# Patient Record
Sex: MALE | Race: WHITE | NOT HISPANIC OR LATINO | URBAN - METROPOLITAN AREA
[De-identification: names, ages, dates, MRNs, and addresses within clinical notes are randomized per-mention and may not be internally consistent; named-entity substitution may affect disease eponyms.]

---

## 2017-01-02 ENCOUNTER — OFFICE VISIT - HEALTHEAST (OUTPATIENT)
Dept: FAMILY MEDICINE | Facility: CLINIC | Age: 52
End: 2017-01-02

## 2017-01-02 ENCOUNTER — HOSPITAL ENCOUNTER (OUTPATIENT)
Dept: CT IMAGING | Facility: CLINIC | Age: 52
Discharge: HOME | End: 2017-01-02

## 2017-01-02 DIAGNOSIS — R10.9 ABDOMINAL PAIN: ICD-10-CM

## 2017-01-02 DIAGNOSIS — K57.20 DIVERTICULITIS OF LARGE INTESTINE WITH ABSCESS WITHOUT BLEEDING: ICD-10-CM

## 2017-01-11 ENCOUNTER — COMMUNICATION - HEALTHEAST (OUTPATIENT)
Dept: FAMILY MEDICINE | Facility: CLINIC | Age: 52
End: 2017-01-11

## 2017-01-12 ENCOUNTER — OFFICE VISIT - HEALTHEAST (OUTPATIENT)
Dept: FAMILY MEDICINE | Facility: CLINIC | Age: 52
End: 2017-01-12

## 2017-01-12 ENCOUNTER — COMMUNICATION - HEALTHEAST (OUTPATIENT)
Dept: FAMILY MEDICINE | Facility: CLINIC | Age: 52
End: 2017-01-12

## 2017-01-12 ENCOUNTER — COMMUNICATION - HEALTHEAST (OUTPATIENT)
Dept: ONCOLOGY | Facility: CLINIC | Age: 52
End: 2017-01-12

## 2017-01-12 DIAGNOSIS — K57.32 DIVERTICULITIS OF LARGE INTESTINE WITHOUT PERFORATION OR ABSCESS WITHOUT BLEEDING: ICD-10-CM

## 2017-01-12 ASSESSMENT — MIFFLIN-ST. JEOR: SCORE: 1699.01

## 2017-01-23 ENCOUNTER — HOSPITAL ENCOUNTER (OUTPATIENT)
Dept: RADIOLOGY | Facility: CLINIC | Age: 52
Discharge: HOME | End: 2017-01-23
Attending: INTERNAL MEDICINE

## 2017-01-23 ENCOUNTER — OFFICE VISIT - HEALTHEAST (OUTPATIENT)
Dept: ONCOLOGY | Facility: CLINIC | Age: 52
End: 2017-01-23

## 2017-01-23 ENCOUNTER — AMBULATORY - HEALTHEAST (OUTPATIENT)
Dept: INFUSION THERAPY | Facility: CLINIC | Age: 52
End: 2017-01-23

## 2017-01-23 DIAGNOSIS — C43.9 MELANOMA (H): ICD-10-CM

## 2017-01-23 DIAGNOSIS — C43.59 MALIGNANT MELANOMA OF SKIN OF TRUNK, EXCEPT SCROTUM (H): ICD-10-CM

## 2017-01-24 ENCOUNTER — COMMUNICATION - HEALTHEAST (OUTPATIENT)
Dept: ONCOLOGY | Facility: CLINIC | Age: 52
End: 2017-01-24

## 2017-04-19 ENCOUNTER — COMMUNICATION - HEALTHEAST (OUTPATIENT)
Dept: SCHEDULING | Facility: CLINIC | Age: 52
End: 2017-04-19

## 2017-04-19 DIAGNOSIS — E03.9 HYPOTHYROIDISM: ICD-10-CM

## 2017-07-17 ENCOUNTER — HOSPITAL ENCOUNTER (OUTPATIENT)
Dept: CT IMAGING | Facility: CLINIC | Age: 52
Setting detail: RADIATION/ONCOLOGY SERIES
Discharge: STILL ADMITTED OR ADMINISTRATIVE | End: 2017-07-17
Attending: INTERNAL MEDICINE

## 2017-07-17 DIAGNOSIS — C43.9 MELANOMA (H): ICD-10-CM

## 2017-07-31 ENCOUNTER — AMBULATORY - HEALTHEAST (OUTPATIENT)
Dept: INFUSION THERAPY | Facility: CLINIC | Age: 52
End: 2017-07-31

## 2017-07-31 ENCOUNTER — OFFICE VISIT - HEALTHEAST (OUTPATIENT)
Dept: FAMILY MEDICINE | Facility: CLINIC | Age: 52
End: 2017-07-31

## 2017-07-31 ENCOUNTER — OFFICE VISIT - HEALTHEAST (OUTPATIENT)
Dept: ONCOLOGY | Facility: CLINIC | Age: 52
End: 2017-07-31

## 2017-07-31 DIAGNOSIS — Z13.220 SCREENING, LIPID: ICD-10-CM

## 2017-07-31 DIAGNOSIS — C43.59 MALIGNANT MELANOMA OF SKIN OF TRUNK, EXCEPT SCROTUM (H): ICD-10-CM

## 2017-07-31 DIAGNOSIS — E03.9 HYPOTHYROIDISM: ICD-10-CM

## 2017-07-31 DIAGNOSIS — E83.52 HYPERCALCEMIA: ICD-10-CM

## 2017-07-31 DIAGNOSIS — J45.20 MILD INTERMITTENT ASTHMA WITHOUT COMPLICATION: ICD-10-CM

## 2017-07-31 DIAGNOSIS — K80.20 ASYMPTOMATIC GALLSTONES: ICD-10-CM

## 2017-07-31 DIAGNOSIS — C43.9 MELANOMA (H): ICD-10-CM

## 2017-07-31 DIAGNOSIS — N20.0 KIDNEY STONE ON LEFT SIDE: ICD-10-CM

## 2017-07-31 LAB
CHOLEST SERPL-MCNC: 180 MG/DL
FASTING STATUS PATIENT QL REPORTED: ABNORMAL
HDLC SERPL-MCNC: 33 MG/DL
LDLC SERPL CALC-MCNC: 83 MG/DL
TRIGL SERPL-MCNC: 319 MG/DL

## 2017-08-23 ENCOUNTER — COMMUNICATION - HEALTHEAST (OUTPATIENT)
Dept: FAMILY MEDICINE | Facility: CLINIC | Age: 52
End: 2017-08-23

## 2017-08-23 DIAGNOSIS — J45.20 MILD INTERMITTENT ASTHMA WITHOUT COMPLICATION: ICD-10-CM

## 2017-10-06 ENCOUNTER — OFFICE VISIT - HEALTHEAST (OUTPATIENT)
Dept: FAMILY MEDICINE | Facility: CLINIC | Age: 52
End: 2017-10-06

## 2017-10-06 ENCOUNTER — COMMUNICATION - HEALTHEAST (OUTPATIENT)
Dept: FAMILY MEDICINE | Facility: CLINIC | Age: 52
End: 2017-10-06

## 2017-10-06 DIAGNOSIS — Z23 NEED FOR INFLUENZA VACCINATION: ICD-10-CM

## 2017-10-06 DIAGNOSIS — R10.9 FLANK PAIN: ICD-10-CM

## 2017-10-06 ASSESSMENT — MIFFLIN-ST. JEOR: SCORE: 1713.53

## 2017-10-09 ENCOUNTER — COMMUNICATION - HEALTHEAST (OUTPATIENT)
Dept: FAMILY MEDICINE | Facility: CLINIC | Age: 52
End: 2017-10-09

## 2017-10-09 DIAGNOSIS — E03.9 HYPOTHYROIDISM: ICD-10-CM

## 2017-11-07 ENCOUNTER — OFFICE VISIT - HEALTHEAST (OUTPATIENT)
Dept: FAMILY MEDICINE | Facility: CLINIC | Age: 52
End: 2017-11-07

## 2017-11-07 ENCOUNTER — OFFICE VISIT - HEALTHEAST (OUTPATIENT)
Dept: UROLOGY | Facility: CLINIC | Age: 52
End: 2017-11-07

## 2017-11-07 ENCOUNTER — HOSPITAL ENCOUNTER (OUTPATIENT)
Dept: CT IMAGING | Facility: CLINIC | Age: 52
Discharge: HOME | End: 2017-11-07
Attending: FAMILY MEDICINE

## 2017-11-07 ENCOUNTER — COMMUNICATION - HEALTHEAST (OUTPATIENT)
Dept: FAMILY MEDICINE | Facility: CLINIC | Age: 52
End: 2017-11-07

## 2017-11-07 DIAGNOSIS — R10.9 LEFT FLANK PAIN: ICD-10-CM

## 2017-11-07 DIAGNOSIS — N20.9 CALCULUS OF URINARY TRACT: ICD-10-CM

## 2017-11-07 DIAGNOSIS — N20.1 CALCULUS OF URETER: ICD-10-CM

## 2017-11-13 ENCOUNTER — COMMUNICATION - HEALTHEAST (OUTPATIENT)
Dept: UROLOGY | Facility: CLINIC | Age: 52
End: 2017-11-13

## 2017-11-14 ENCOUNTER — ANESTHESIA - HEALTHEAST (OUTPATIENT)
Dept: SURGERY | Facility: CLINIC | Age: 52
End: 2017-11-14

## 2017-11-14 ENCOUNTER — SURGERY - HEALTHEAST (OUTPATIENT)
Dept: SURGERY | Facility: CLINIC | Age: 52
End: 2017-11-14

## 2017-11-14 ENCOUNTER — OFFICE VISIT - HEALTHEAST (OUTPATIENT)
Dept: UROLOGY | Facility: CLINIC | Age: 52
End: 2017-11-14

## 2017-11-14 DIAGNOSIS — N20.0 CALCULUS OF KIDNEY: ICD-10-CM

## 2017-11-14 DIAGNOSIS — N20.1 CALCULUS OF URETER: ICD-10-CM

## 2017-11-14 DIAGNOSIS — N20.9 URINARY TRACT STONES: ICD-10-CM

## 2017-11-14 ASSESSMENT — MIFFLIN-ST. JEOR: SCORE: 1685.4

## 2017-11-21 ENCOUNTER — AMBULATORY - HEALTHEAST (OUTPATIENT)
Dept: UROLOGY | Facility: CLINIC | Age: 52
End: 2017-11-21

## 2017-11-21 DIAGNOSIS — N20.9 URINARY TRACT STONES: ICD-10-CM

## 2017-11-21 DIAGNOSIS — N20.1 CALCULUS OF URETER: ICD-10-CM

## 2017-11-27 ENCOUNTER — COMMUNICATION - HEALTHEAST (OUTPATIENT)
Dept: FAMILY MEDICINE | Facility: CLINIC | Age: 52
End: 2017-11-27

## 2017-11-27 ENCOUNTER — OFFICE VISIT - HEALTHEAST (OUTPATIENT)
Dept: FAMILY MEDICINE | Facility: CLINIC | Age: 52
End: 2017-11-27

## 2017-11-27 DIAGNOSIS — R10.84 DIFFUSE ABDOMINAL PAIN: ICD-10-CM

## 2017-11-27 DIAGNOSIS — E03.9 HYPOTHYROIDISM: ICD-10-CM

## 2017-12-06 ENCOUNTER — COMMUNICATION - HEALTHEAST (OUTPATIENT)
Dept: FAMILY MEDICINE | Facility: CLINIC | Age: 52
End: 2017-12-06

## 2017-12-08 ENCOUNTER — COMMUNICATION - HEALTHEAST (OUTPATIENT)
Dept: FAMILY MEDICINE | Facility: CLINIC | Age: 52
End: 2017-12-08

## 2017-12-11 ENCOUNTER — COMMUNICATION - HEALTHEAST (OUTPATIENT)
Dept: FAMILY MEDICINE | Facility: CLINIC | Age: 52
End: 2017-12-11

## 2017-12-16 ENCOUNTER — COMMUNICATION - HEALTHEAST (OUTPATIENT)
Dept: SCHEDULING | Facility: CLINIC | Age: 52
End: 2017-12-16

## 2017-12-16 ENCOUNTER — COMMUNICATION - HEALTHEAST (OUTPATIENT)
Dept: FAMILY MEDICINE | Facility: CLINIC | Age: 52
End: 2017-12-16

## 2017-12-16 DIAGNOSIS — R10.84 DIFFUSE ABDOMINAL PAIN: ICD-10-CM

## 2017-12-18 ENCOUNTER — OFFICE VISIT - HEALTHEAST (OUTPATIENT)
Dept: FAMILY MEDICINE | Facility: CLINIC | Age: 52
End: 2017-12-18

## 2017-12-18 DIAGNOSIS — R10.32 LLQ PAIN: ICD-10-CM

## 2017-12-19 ENCOUNTER — COMMUNICATION - HEALTHEAST (OUTPATIENT)
Dept: FAMILY MEDICINE | Facility: CLINIC | Age: 52
End: 2017-12-19

## 2018-01-02 ENCOUNTER — COMMUNICATION - HEALTHEAST (OUTPATIENT)
Dept: FAMILY MEDICINE | Facility: CLINIC | Age: 53
End: 2018-01-02

## 2018-01-08 ENCOUNTER — COMMUNICATION - HEALTHEAST (OUTPATIENT)
Dept: UROLOGY | Facility: CLINIC | Age: 53
End: 2018-01-08

## 2018-01-15 ENCOUNTER — OFFICE VISIT - HEALTHEAST (OUTPATIENT)
Dept: UROLOGY | Facility: CLINIC | Age: 53
End: 2018-01-15

## 2018-01-15 ENCOUNTER — AMBULATORY - HEALTHEAST (OUTPATIENT)
Dept: LAB | Facility: CLINIC | Age: 53
End: 2018-01-15

## 2018-01-15 DIAGNOSIS — N20.0 CALCULUS OF KIDNEY: ICD-10-CM

## 2018-01-15 DIAGNOSIS — N20.9 URINARY TRACT STONES: ICD-10-CM

## 2018-01-15 DIAGNOSIS — N20.1 CALCULUS OF URETER: ICD-10-CM

## 2018-01-15 LAB
ALBUMIN SERPL-MCNC: 3.5 G/DL (ref 3.5–5)
ALBUMIN UR-MCNC: NEGATIVE MG/DL
ANION GAP SERPL CALCULATED.3IONS-SCNC: 7 MMOL/L (ref 5–18)
APPEARANCE UR: CLEAR
BILIRUB UR QL STRIP: NEGATIVE
BUN SERPL-MCNC: 13 MG/DL (ref 8–22)
CALCIUM SERPL-MCNC: 10.7 MG/DL (ref 8.5–10.5)
CALCIUM SERPL-MCNC: 10.8 MG/DL (ref 8.5–10.5)
CALCIUM, IONIZED MEASURED: 1.45 MMOL/L (ref 1.11–1.3)
CHLORIDE BLD-SCNC: 104 MMOL/L (ref 98–107)
CO2 SERPL-SCNC: 27 MMOL/L (ref 22–31)
COLOR UR AUTO: YELLOW
CREAT SERPL-MCNC: 0.9 MG/DL (ref 0.7–1.3)
CREAT SERPL-MCNC: 0.93 MG/DL (ref 0.7–1.3)
GFR SERPL CREATININE-BSD FRML MDRD: >60 ML/MIN/1.73M2
GFR SERPL CREATININE-BSD FRML MDRD: >60 ML/MIN/1.73M2
GLUCOSE BLD-MCNC: 96 MG/DL (ref 70–125)
GLUCOSE UR STRIP-MCNC: NEGATIVE MG/DL
HGB UR QL STRIP: NEGATIVE
ION CA PH 7.4: 1.43 MMOL/L (ref 1.11–1.3)
KETONES UR STRIP-MCNC: NEGATIVE MG/DL
LEUKOCYTE ESTERASE UR QL STRIP: NEGATIVE
NITRATE UR QL: NEGATIVE
PH UR STRIP: 5.5 [PH] (ref 5–8)
PH: 7.36 (ref 7.35–7.45)
PHOSPHATE SERPL-MCNC: 1.9 MG/DL (ref 2.5–4.5)
PHOSPHATE SERPL-MCNC: 2 MG/DL (ref 2.5–4.5)
POTASSIUM BLD-SCNC: 4.2 MMOL/L (ref 3.5–5)
PTH-INTACT SERPL-MCNC: 133 PG/ML (ref 10–86)
SODIUM SERPL-SCNC: 138 MMOL/L (ref 136–145)
SP GR UR STRIP: >=1.03 (ref 1–1.03)
URATE SERPL-MCNC: 4.5 MG/DL (ref 3–8)
UROBILINOGEN UR STRIP-ACNC: NORMAL

## 2018-01-22 ENCOUNTER — RECORDS - HEALTHEAST (OUTPATIENT)
Dept: ADMINISTRATIVE | Facility: OTHER | Age: 53
End: 2018-01-22

## 2018-01-22 ENCOUNTER — HOSPITAL ENCOUNTER (OUTPATIENT)
Dept: CT IMAGING | Facility: CLINIC | Age: 53
Setting detail: RADIATION/ONCOLOGY SERIES
Discharge: STILL ADMITTED OR ADMINISTRATIVE | End: 2018-01-22
Attending: INTERNAL MEDICINE

## 2018-01-22 DIAGNOSIS — C43.59 MALIGNANT MELANOMA OF SKIN OF TRUNK, EXCEPT SCROTUM (H): ICD-10-CM

## 2018-01-29 ENCOUNTER — AMBULATORY - HEALTHEAST (OUTPATIENT)
Dept: INFUSION THERAPY | Facility: CLINIC | Age: 53
End: 2018-01-29

## 2018-01-29 ENCOUNTER — OFFICE VISIT - HEALTHEAST (OUTPATIENT)
Dept: ONCOLOGY | Facility: CLINIC | Age: 53
End: 2018-01-29

## 2018-01-29 DIAGNOSIS — C43.59 MALIGNANT MELANOMA OF SKIN OF TRUNK, EXCEPT SCROTUM (H): ICD-10-CM

## 2018-01-29 DIAGNOSIS — E21.3 HYPERPARATHYROIDISM (H): ICD-10-CM

## 2018-01-29 DIAGNOSIS — E83.52 HYPERCALCEMIA: ICD-10-CM

## 2018-01-29 LAB
ALBUMIN SERPL-MCNC: 3.8 G/DL (ref 3.5–5)
ALP SERPL-CCNC: 76 U/L (ref 45–120)
ALT SERPL W P-5'-P-CCNC: 21 U/L (ref 0–45)
ANION GAP SERPL CALCULATED.3IONS-SCNC: 8 MMOL/L (ref 5–18)
AST SERPL W P-5'-P-CCNC: 19 U/L (ref 0–40)
BASOPHILS # BLD AUTO: 0 THOU/UL (ref 0–0.2)
BASOPHILS NFR BLD AUTO: 1 % (ref 0–2)
BILIRUB SERPL-MCNC: 0.5 MG/DL (ref 0–1)
BUN SERPL-MCNC: 16 MG/DL (ref 8–22)
CALCIUM SERPL-MCNC: 11.5 MG/DL (ref 8.5–10.5)
CHLORIDE BLD-SCNC: 104 MMOL/L (ref 98–107)
CO2 SERPL-SCNC: 28 MMOL/L (ref 22–31)
CREAT SERPL-MCNC: 1.07 MG/DL (ref 0.7–1.3)
EOSINOPHIL # BLD AUTO: 0.2 THOU/UL (ref 0–0.4)
EOSINOPHIL NFR BLD AUTO: 5 % (ref 0–6)
ERYTHROCYTE [DISTWIDTH] IN BLOOD BY AUTOMATED COUNT: 13.2 % (ref 11–14.5)
GFR SERPL CREATININE-BSD FRML MDRD: >60 ML/MIN/1.73M2
GLUCOSE BLD-MCNC: 98 MG/DL (ref 70–125)
HCT VFR BLD AUTO: 42.9 % (ref 40–54)
HGB BLD-MCNC: 14.4 G/DL (ref 14–18)
LYMPHOCYTES # BLD AUTO: 1.5 THOU/UL (ref 0.8–4.4)
LYMPHOCYTES NFR BLD AUTO: 34 % (ref 20–40)
MCH RBC QN AUTO: 28.5 PG (ref 27–34)
MCHC RBC AUTO-ENTMCNC: 33.6 G/DL (ref 32–36)
MCV RBC AUTO: 85 FL (ref 80–100)
MONOCYTES # BLD AUTO: 0.3 THOU/UL (ref 0–0.9)
MONOCYTES NFR BLD AUTO: 7 % (ref 2–10)
NEUTROPHILS # BLD AUTO: 2.4 THOU/UL (ref 2–7.7)
NEUTROPHILS NFR BLD AUTO: 54 % (ref 50–70)
PLATELET # BLD AUTO: 225 THOU/UL (ref 140–440)
PMV BLD AUTO: 8.8 FL (ref 8.5–12.5)
POTASSIUM BLD-SCNC: 4.2 MMOL/L (ref 3.5–5)
PROT SERPL-MCNC: 7.6 G/DL (ref 6–8)
RBC # BLD AUTO: 5.05 MILL/UL (ref 4.4–6.2)
SODIUM SERPL-SCNC: 140 MMOL/L (ref 136–145)
WBC: 4.5 THOU/UL (ref 4–11)

## 2018-01-29 ASSESSMENT — MIFFLIN-ST. JEOR: SCORE: 1668.62

## 2018-01-30 LAB — 25(OH)D3 SERPL-MCNC: 30.8 NG/ML (ref 30–80)

## 2018-02-02 ENCOUNTER — COMMUNICATION - HEALTHEAST (OUTPATIENT)
Dept: ONCOLOGY | Facility: HOSPITAL | Age: 53
End: 2018-02-02

## 2018-07-27 ENCOUNTER — COMMUNICATION - HEALTHEAST (OUTPATIENT)
Dept: ONCOLOGY | Facility: CLINIC | Age: 53
End: 2018-07-27

## 2018-08-02 ENCOUNTER — HOSPITAL ENCOUNTER (OUTPATIENT)
Dept: CT IMAGING | Facility: CLINIC | Age: 53
Setting detail: RADIATION/ONCOLOGY SERIES
Discharge: STILL ADMITTED OR ADMINISTRATIVE | End: 2018-08-02
Attending: INTERNAL MEDICINE

## 2018-08-02 DIAGNOSIS — E83.52 HYPERCALCEMIA: ICD-10-CM

## 2018-08-06 ENCOUNTER — OFFICE VISIT - HEALTHEAST (OUTPATIENT)
Dept: ONCOLOGY | Facility: CLINIC | Age: 53
End: 2018-08-06

## 2018-08-06 ENCOUNTER — AMBULATORY - HEALTHEAST (OUTPATIENT)
Dept: INFUSION THERAPY | Facility: CLINIC | Age: 53
End: 2018-08-06

## 2018-08-06 DIAGNOSIS — C43.59 MALIGNANT MELANOMA OF SKIN OF TRUNK, EXCEPT SCROTUM (H): ICD-10-CM

## 2018-08-06 DIAGNOSIS — E21.0 HYPERPARATHYROIDISM, PRIMARY (H): ICD-10-CM

## 2018-08-06 DIAGNOSIS — E21.3 HYPERPARATHYROIDISM (H): ICD-10-CM

## 2018-08-06 LAB
ALBUMIN SERPL-MCNC: 4 G/DL (ref 3.5–5)
ALP SERPL-CCNC: 84 U/L (ref 45–120)
ALT SERPL W P-5'-P-CCNC: 19 U/L (ref 0–45)
ANION GAP SERPL CALCULATED.3IONS-SCNC: 8 MMOL/L (ref 5–18)
AST SERPL W P-5'-P-CCNC: 18 U/L (ref 0–40)
BASOPHILS # BLD AUTO: 0 THOU/UL (ref 0–0.2)
BASOPHILS NFR BLD AUTO: 1 % (ref 0–2)
BILIRUB SERPL-MCNC: 0.5 MG/DL (ref 0–1)
BUN SERPL-MCNC: 18 MG/DL (ref 8–22)
CALCIUM SERPL-MCNC: 11.5 MG/DL (ref 8.5–10.5)
CHLORIDE BLD-SCNC: 103 MMOL/L (ref 98–107)
CO2 SERPL-SCNC: 26 MMOL/L (ref 22–31)
CREAT SERPL-MCNC: 1.1 MG/DL (ref 0.7–1.3)
EOSINOPHIL # BLD AUTO: 0.2 THOU/UL (ref 0–0.4)
EOSINOPHIL NFR BLD AUTO: 3 % (ref 0–6)
ERYTHROCYTE [DISTWIDTH] IN BLOOD BY AUTOMATED COUNT: 12.7 % (ref 11–14.5)
GFR SERPL CREATININE-BSD FRML MDRD: >60 ML/MIN/1.73M2
GLUCOSE BLD-MCNC: 107 MG/DL (ref 70–125)
HCT VFR BLD AUTO: 45.5 % (ref 40–54)
HGB BLD-MCNC: 15.3 G/DL (ref 14–18)
LYMPHOCYTES # BLD AUTO: 2 THOU/UL (ref 0.8–4.4)
LYMPHOCYTES NFR BLD AUTO: 32 % (ref 20–40)
MCH RBC QN AUTO: 28.9 PG (ref 27–34)
MCHC RBC AUTO-ENTMCNC: 33.6 G/DL (ref 32–36)
MCV RBC AUTO: 86 FL (ref 80–100)
MONOCYTES # BLD AUTO: 0.4 THOU/UL (ref 0–0.9)
MONOCYTES NFR BLD AUTO: 6 % (ref 2–10)
NEUTROPHILS # BLD AUTO: 3.6 THOU/UL (ref 2–7.7)
NEUTROPHILS NFR BLD AUTO: 59 % (ref 50–70)
PLATELET # BLD AUTO: 226 THOU/UL (ref 140–440)
PMV BLD AUTO: 8.8 FL (ref 8.5–12.5)
POTASSIUM BLD-SCNC: 4.2 MMOL/L (ref 3.5–5)
PROT SERPL-MCNC: 8 G/DL (ref 6–8)
PTH-INTACT SERPL-MCNC: 110 PG/ML (ref 10–86)
RBC # BLD AUTO: 5.29 MILL/UL (ref 4.4–6.2)
SODIUM SERPL-SCNC: 137 MMOL/L (ref 136–145)
WBC: 6.2 THOU/UL (ref 4–11)

## 2018-08-06 ASSESSMENT — MIFFLIN-ST. JEOR: SCORE: 1687.22

## 2018-08-08 ENCOUNTER — COMMUNICATION - HEALTHEAST (OUTPATIENT)
Dept: ADMINISTRATIVE | Facility: CLINIC | Age: 53
End: 2018-08-08

## 2018-08-08 DIAGNOSIS — E21.3 HYPERPARATHYROIDISM (H): ICD-10-CM

## 2018-08-13 ENCOUNTER — AMBULATORY - HEALTHEAST (OUTPATIENT)
Dept: LAB | Facility: CLINIC | Age: 53
End: 2018-08-13

## 2018-08-13 DIAGNOSIS — E21.3 HYPERPARATHYROIDISM (H): ICD-10-CM

## 2018-08-13 LAB
CALCIUM SERPL-MCNC: 11.8 MG/DL (ref 8.5–10.5)
CALCIUM SERPL-MCNC: 11.9 MG/DL (ref 8.5–10.5)
CREAT SERPL-MCNC: 1.09 MG/DL (ref 0.7–1.3)
CREAT SERPL-MCNC: 1.09 MG/DL (ref 0.7–1.3)
GFR SERPL CREATININE-BSD FRML MDRD: >60 ML/MIN/1.73M2
GFR SERPL CREATININE-BSD FRML MDRD: >60 ML/MIN/1.73M2
PHOSPHATE SERPL-MCNC: 2.3 MG/DL (ref 2.5–4.5)
PHOSPHATE SERPL-MCNC: 2.3 MG/DL (ref 2.5–4.5)
POTASSIUM BLD-SCNC: 4.4 MMOL/L (ref 3.5–5)
PTH-INTACT SERPL-MCNC: 100 PG/ML (ref 10–86)

## 2018-08-14 LAB
25(OH)D3 SERPL-MCNC: 30.3 NG/ML (ref 30–80)
25(OH)D3 SERPL-MCNC: 30.3 NG/ML (ref 30–80)

## 2018-08-16 LAB — 1,25(OH)2D SERPL-MCNC: 67 PG/ML (ref 19.9–79.3)

## 2018-08-20 ENCOUNTER — AMBULATORY - HEALTHEAST (OUTPATIENT)
Dept: ENDOCRINOLOGY | Facility: CLINIC | Age: 53
End: 2018-08-20

## 2018-08-20 DIAGNOSIS — E21.3 HYPERPARATHYROIDISM (H): ICD-10-CM

## 2018-08-23 ENCOUNTER — HOSPITAL ENCOUNTER (OUTPATIENT)
Dept: NUCLEAR MEDICINE | Facility: CLINIC | Age: 53
Discharge: HOME | End: 2018-08-23
Attending: INTERNAL MEDICINE

## 2018-08-23 ENCOUNTER — HOSPITAL ENCOUNTER (OUTPATIENT)
Dept: ULTRASOUND IMAGING | Facility: CLINIC | Age: 53
Discharge: HOME | End: 2018-08-23
Attending: INTERNAL MEDICINE

## 2018-08-23 DIAGNOSIS — E21.3 HYPERPARATHYROIDISM (H): ICD-10-CM

## 2018-09-10 ENCOUNTER — OFFICE VISIT - HEALTHEAST (OUTPATIENT)
Dept: ENDOCRINOLOGY | Facility: CLINIC | Age: 53
End: 2018-09-10

## 2018-09-10 DIAGNOSIS — E21.3 HYPERPARATHYROIDISM (H): ICD-10-CM

## 2018-09-10 ASSESSMENT — MIFFLIN-ST. JEOR: SCORE: 1693.57

## 2018-09-18 ENCOUNTER — AMBULATORY - HEALTHEAST (OUTPATIENT)
Dept: LAB | Facility: CLINIC | Age: 53
End: 2018-09-18

## 2018-09-18 ENCOUNTER — OFFICE VISIT - HEALTHEAST (OUTPATIENT)
Dept: SURGERY | Facility: CLINIC | Age: 53
End: 2018-09-18

## 2018-09-18 DIAGNOSIS — E21.3 HYPERPARATHYROIDISM (H): ICD-10-CM

## 2018-09-18 LAB
CALCIUM 24H UR-MRATE: 538 MG/24HR (ref 25–300)
CALCIUM UR-MCNC: 53.8 MG/DL
SPECIMEN VOL UR: 1000 ML

## 2018-09-18 ASSESSMENT — MIFFLIN-ST. JEOR: SCORE: 1686.31

## 2018-10-02 ASSESSMENT — MIFFLIN-ST. JEOR: SCORE: 1671.79

## 2018-10-05 ENCOUNTER — OFFICE VISIT - HEALTHEAST (OUTPATIENT)
Dept: FAMILY MEDICINE | Facility: CLINIC | Age: 53
End: 2018-10-05

## 2018-10-05 DIAGNOSIS — J45.20 MILD INTERMITTENT ASTHMA WITHOUT COMPLICATION: ICD-10-CM

## 2018-10-05 DIAGNOSIS — R12 HEARTBURN: ICD-10-CM

## 2018-10-05 DIAGNOSIS — Z01.818 ENCOUNTER FOR PREOPERATIVE EXAMINATION FOR GENERAL SURGICAL PROCEDURE: ICD-10-CM

## 2018-10-05 DIAGNOSIS — E03.9 HYPOTHYROIDISM: ICD-10-CM

## 2018-10-05 DIAGNOSIS — E21.3 HYPERPARATHYROIDISM (H): ICD-10-CM

## 2018-10-05 DIAGNOSIS — Z23 NEED FOR VACCINATION: ICD-10-CM

## 2018-10-05 LAB
ANION GAP SERPL CALCULATED.3IONS-SCNC: 11 MMOL/L (ref 5–18)
BUN SERPL-MCNC: 19 MG/DL (ref 8–22)
CALCIUM SERPL-MCNC: 12 MG/DL (ref 8.5–10.5)
CHLORIDE BLD-SCNC: 104 MMOL/L (ref 98–107)
CO2 SERPL-SCNC: 25 MMOL/L (ref 22–31)
CREAT SERPL-MCNC: 1.02 MG/DL (ref 0.7–1.3)
ERYTHROCYTE [DISTWIDTH] IN BLOOD BY AUTOMATED COUNT: 12.2 % (ref 11–14.5)
GFR SERPL CREATININE-BSD FRML MDRD: >60 ML/MIN/1.73M2
GLUCOSE BLD-MCNC: 93 MG/DL (ref 70–125)
HCT VFR BLD AUTO: 43.6 % (ref 40–54)
HGB BLD-MCNC: 15.2 G/DL (ref 14–18)
MAGNESIUM SERPL-MCNC: 2.4 MG/DL (ref 1.8–2.6)
MCH RBC QN AUTO: 29.7 PG (ref 27–34)
MCHC RBC AUTO-ENTMCNC: 34.9 G/DL (ref 32–36)
MCV RBC AUTO: 85 FL (ref 80–100)
PLATELET # BLD AUTO: 240 THOU/UL (ref 140–440)
PMV BLD AUTO: 6.8 FL (ref 7–10)
POTASSIUM BLD-SCNC: 4.6 MMOL/L (ref 3.5–5)
RBC # BLD AUTO: 5.13 MILL/UL (ref 4.4–6.2)
SODIUM SERPL-SCNC: 140 MMOL/L (ref 136–145)
T4 FREE SERPL-MCNC: 1.5 NG/DL (ref 0.7–1.8)
TSH SERPL DL<=0.005 MIU/L-ACNC: 2.75 UIU/ML (ref 0.3–5)
WBC: 6 THOU/UL (ref 4–11)

## 2018-10-05 ASSESSMENT — MIFFLIN-ST. JEOR: SCORE: 1687.44

## 2018-10-08 ENCOUNTER — COMMUNICATION - HEALTHEAST (OUTPATIENT)
Dept: FAMILY MEDICINE | Facility: CLINIC | Age: 53
End: 2018-10-08

## 2018-10-09 ENCOUNTER — HOSPITAL ENCOUNTER (OUTPATIENT)
Dept: NUCLEAR MEDICINE | Facility: HOSPITAL | Age: 53
Discharge: HOME | End: 2018-10-09
Attending: SURGERY | Admitting: RADIOLOGY

## 2018-10-09 ENCOUNTER — ANESTHESIA - HEALTHEAST (OUTPATIENT)
Dept: SURGERY | Facility: AMBULATORY SURGERY CENTER | Age: 53
End: 2018-10-09

## 2018-10-09 ENCOUNTER — SURGERY - HEALTHEAST (OUTPATIENT)
Dept: SURGERY | Facility: AMBULATORY SURGERY CENTER | Age: 53
End: 2018-10-09

## 2018-10-09 ENCOUNTER — RECORDS - HEALTHEAST (OUTPATIENT)
Dept: LAB | Facility: HOSPITAL | Age: 53
End: 2018-10-09

## 2018-10-09 DIAGNOSIS — E21.3 HYPERPARATHYROIDISM (H): ICD-10-CM

## 2018-10-09 LAB — PTH-INTACT SERPL-MCNC: 128 PG/ML (ref 10–86)

## 2018-10-09 ASSESSMENT — MIFFLIN-ST. JEOR: SCORE: 1671.79

## 2018-10-17 ENCOUNTER — COMMUNICATION - HEALTHEAST (OUTPATIENT)
Dept: FAMILY MEDICINE | Facility: CLINIC | Age: 53
End: 2018-10-17

## 2018-10-17 ENCOUNTER — OFFICE VISIT - HEALTHEAST (OUTPATIENT)
Dept: FAMILY MEDICINE | Facility: CLINIC | Age: 53
End: 2018-10-17

## 2018-10-17 DIAGNOSIS — J45.20 MILD INTERMITTENT ASTHMA WITHOUT COMPLICATION: ICD-10-CM

## 2018-10-17 DIAGNOSIS — R10.31 ABDOMINAL PAIN, RIGHT LOWER QUADRANT: ICD-10-CM

## 2018-10-17 DIAGNOSIS — M25.512 PAIN IN SHOULDER REGION, LEFT: ICD-10-CM

## 2018-10-17 LAB
BASOPHILS # BLD AUTO: 0 THOU/UL (ref 0–0.2)
BASOPHILS NFR BLD AUTO: 0 % (ref 0–2)
EOSINOPHIL # BLD AUTO: 0.2 THOU/UL (ref 0–0.4)
EOSINOPHIL NFR BLD AUTO: 2 % (ref 0–6)
ERYTHROCYTE [DISTWIDTH] IN BLOOD BY AUTOMATED COUNT: 12.5 % (ref 11–14.5)
ERYTHROCYTE [SEDIMENTATION RATE] IN BLOOD BY WESTERGREN METHOD: 49 MM/HR (ref 0–15)
HCT VFR BLD AUTO: 40.6 % (ref 40–54)
HGB BLD-MCNC: 13.9 G/DL (ref 14–18)
LYMPHOCYTES # BLD AUTO: 1.4 THOU/UL (ref 0.8–4.4)
LYMPHOCYTES NFR BLD AUTO: 15 % (ref 20–40)
MCH RBC QN AUTO: 29.1 PG (ref 27–34)
MCHC RBC AUTO-ENTMCNC: 34.1 G/DL (ref 32–36)
MCV RBC AUTO: 85 FL (ref 80–100)
MONOCYTES # BLD AUTO: 0.6 THOU/UL (ref 0–0.9)
MONOCYTES NFR BLD AUTO: 7 % (ref 2–10)
NEUTROPHILS # BLD AUTO: 6.8 THOU/UL (ref 2–7.7)
NEUTROPHILS NFR BLD AUTO: 76 % (ref 50–70)
PLATELET # BLD AUTO: 257 THOU/UL (ref 140–440)
PMV BLD AUTO: 6.7 FL (ref 7–10)
RBC # BLD AUTO: 4.76 MILL/UL (ref 4.4–6.2)
WBC: 9 THOU/UL (ref 4–11)

## 2018-10-23 ENCOUNTER — OFFICE VISIT - HEALTHEAST (OUTPATIENT)
Dept: SURGERY | Facility: CLINIC | Age: 53
End: 2018-10-23

## 2018-10-23 DIAGNOSIS — E21.0 PRIMARY HYPERPARATHYROIDISM (H): ICD-10-CM

## 2018-10-23 LAB
CALCIUM, IONIZED MEASURED: 1.19 MMOL/L (ref 1.11–1.3)
ION CA PH 7.4: 1.18 MMOL/L (ref 1.11–1.3)
PH: 7.37 (ref 7.35–7.45)

## 2018-10-23 ASSESSMENT — MIFFLIN-ST. JEOR: SCORE: 1689.94

## 2018-11-09 ENCOUNTER — COMMUNICATION - HEALTHEAST (OUTPATIENT)
Dept: FAMILY MEDICINE | Facility: CLINIC | Age: 53
End: 2018-11-09

## 2018-12-07 ENCOUNTER — OFFICE VISIT - HEALTHEAST (OUTPATIENT)
Dept: FAMILY MEDICINE | Facility: CLINIC | Age: 53
End: 2018-12-07

## 2018-12-07 DIAGNOSIS — R07.81 RIB PAIN ON LEFT SIDE: ICD-10-CM

## 2019-02-04 ENCOUNTER — HOSPITAL ENCOUNTER (OUTPATIENT)
Dept: CT IMAGING | Facility: CLINIC | Age: 54
Setting detail: RADIATION/ONCOLOGY SERIES
Discharge: STILL ADMITTED OR ADMINISTRATIVE | End: 2019-02-04
Attending: INTERNAL MEDICINE

## 2019-02-04 DIAGNOSIS — C43.59 MALIGNANT MELANOMA OF SKIN OF TRUNK, EXCEPT SCROTUM (H): ICD-10-CM

## 2019-02-11 ENCOUNTER — OFFICE VISIT - HEALTHEAST (OUTPATIENT)
Dept: ONCOLOGY | Facility: CLINIC | Age: 54
End: 2019-02-11

## 2019-02-11 ENCOUNTER — AMBULATORY - HEALTHEAST (OUTPATIENT)
Dept: INFUSION THERAPY | Facility: CLINIC | Age: 54
End: 2019-02-11

## 2019-02-11 DIAGNOSIS — C43.59 MALIGNANT MELANOMA OF SKIN OF TRUNK, EXCEPT SCROTUM (H): ICD-10-CM

## 2019-02-11 LAB
ALBUMIN SERPL-MCNC: 4 G/DL (ref 3.5–5)
ALP SERPL-CCNC: 77 U/L (ref 45–120)
ALT SERPL W P-5'-P-CCNC: 19 U/L (ref 0–45)
ANION GAP SERPL CALCULATED.3IONS-SCNC: 10 MMOL/L (ref 5–18)
AST SERPL W P-5'-P-CCNC: 19 U/L (ref 0–40)
BASOPHILS # BLD AUTO: 0 THOU/UL (ref 0–0.2)
BASOPHILS NFR BLD AUTO: 0 % (ref 0–2)
BILIRUB SERPL-MCNC: 0.5 MG/DL (ref 0–1)
BUN SERPL-MCNC: 17 MG/DL (ref 8–22)
CALCIUM SERPL-MCNC: 9.7 MG/DL (ref 8.5–10.5)
CHLORIDE BLD-SCNC: 103 MMOL/L (ref 98–107)
CO2 SERPL-SCNC: 27 MMOL/L (ref 22–31)
CREAT SERPL-MCNC: 1.07 MG/DL (ref 0.7–1.3)
EOSINOPHIL # BLD AUTO: 0.2 THOU/UL (ref 0–0.4)
EOSINOPHIL NFR BLD AUTO: 4 % (ref 0–6)
ERYTHROCYTE [DISTWIDTH] IN BLOOD BY AUTOMATED COUNT: 13.1 % (ref 11–14.5)
GFR SERPL CREATININE-BSD FRML MDRD: >60 ML/MIN/1.73M2
GLUCOSE BLD-MCNC: 110 MG/DL (ref 70–125)
HCT VFR BLD AUTO: 43.5 % (ref 40–54)
HGB BLD-MCNC: 14.6 G/DL (ref 14–18)
LYMPHOCYTES # BLD AUTO: 1.4 THOU/UL (ref 0.8–4.4)
LYMPHOCYTES NFR BLD AUTO: 28 % (ref 20–40)
MCH RBC QN AUTO: 28.9 PG (ref 27–34)
MCHC RBC AUTO-ENTMCNC: 33.6 G/DL (ref 32–36)
MCV RBC AUTO: 86 FL (ref 80–100)
MONOCYTES # BLD AUTO: 0.3 THOU/UL (ref 0–0.9)
MONOCYTES NFR BLD AUTO: 6 % (ref 2–10)
NEUTROPHILS # BLD AUTO: 3.1 THOU/UL (ref 2–7.7)
NEUTROPHILS NFR BLD AUTO: 63 % (ref 50–70)
PLATELET # BLD AUTO: 207 THOU/UL (ref 140–440)
PMV BLD AUTO: 8.6 FL (ref 8.5–12.5)
POTASSIUM BLD-SCNC: 4 MMOL/L (ref 3.5–5)
PROT SERPL-MCNC: 7.7 G/DL (ref 6–8)
RBC # BLD AUTO: 5.05 MILL/UL (ref 4.4–6.2)
SODIUM SERPL-SCNC: 140 MMOL/L (ref 136–145)
WBC: 4.9 THOU/UL (ref 4–11)

## 2019-02-13 ENCOUNTER — AMBULATORY - HEALTHEAST (OUTPATIENT)
Dept: ENDOCRINOLOGY | Facility: CLINIC | Age: 54
End: 2019-02-13

## 2019-02-13 DIAGNOSIS — E03.9 HYPOTHYROIDISM: ICD-10-CM

## 2019-03-04 ENCOUNTER — AMBULATORY - HEALTHEAST (OUTPATIENT)
Dept: LAB | Facility: CLINIC | Age: 54
End: 2019-03-04

## 2019-03-04 DIAGNOSIS — E03.9 HYPOTHYROIDISM: ICD-10-CM

## 2019-03-04 LAB
CALCIUM SERPL-MCNC: 9.8 MG/DL (ref 8.5–10.5)
CREAT SERPL-MCNC: 1.1 MG/DL (ref 0.7–1.3)
GFR SERPL CREATININE-BSD FRML MDRD: >60 ML/MIN/1.73M2
POTASSIUM BLD-SCNC: 4.6 MMOL/L (ref 3.5–5)
PTH-INTACT SERPL-MCNC: 40 PG/ML (ref 10–86)
T4 FREE SERPL-MCNC: 1.2 NG/DL (ref 0.7–1.8)
TSH SERPL DL<=0.005 MIU/L-ACNC: 14.04 UIU/ML (ref 0.3–5)

## 2019-03-05 LAB
25(OH)D3 SERPL-MCNC: 32 NG/ML (ref 30–80)
25(OH)D3 SERPL-MCNC: 32 NG/ML (ref 30–80)

## 2019-03-15 ENCOUNTER — COMMUNICATION - HEALTHEAST (OUTPATIENT)
Dept: ENDOCRINOLOGY | Facility: CLINIC | Age: 54
End: 2019-03-15

## 2019-03-15 DIAGNOSIS — E21.3 HYPERPARATHYROIDISM (H): ICD-10-CM

## 2019-03-15 DIAGNOSIS — E03.9 HYPOTHYROIDISM, UNSPECIFIED TYPE: ICD-10-CM

## 2019-04-13 ENCOUNTER — HOSPITAL ENCOUNTER (OUTPATIENT)
Dept: CT IMAGING | Facility: CLINIC | Age: 54
Discharge: HOME | End: 2019-04-13
Attending: FAMILY MEDICINE

## 2019-04-13 ENCOUNTER — OFFICE VISIT - HEALTHEAST (OUTPATIENT)
Dept: FAMILY MEDICINE | Facility: CLINIC | Age: 54
End: 2019-04-13

## 2019-04-13 DIAGNOSIS — R10.9 ABDOMINAL PAIN, UNSPECIFIED ABDOMINAL LOCATION: ICD-10-CM

## 2019-04-13 DIAGNOSIS — K57.92 ACUTE DIVERTICULITIS OF INTESTINE: ICD-10-CM

## 2019-04-13 LAB
ALBUMIN UR-MCNC: NEGATIVE MG/DL
APPEARANCE UR: CLEAR
BILIRUB UR QL STRIP: NEGATIVE
COLOR UR AUTO: YELLOW
ERYTHROCYTE [DISTWIDTH] IN BLOOD BY AUTOMATED COUNT: 11.9 % (ref 11–14.5)
GLUCOSE UR STRIP-MCNC: NEGATIVE MG/DL
HCT VFR BLD AUTO: 44.4 % (ref 40–54)
HGB BLD-MCNC: 14.9 G/DL (ref 14–18)
HGB UR QL STRIP: NEGATIVE
KETONES UR STRIP-MCNC: NEGATIVE MG/DL
LEUKOCYTE ESTERASE UR QL STRIP: NEGATIVE
MCH RBC QN AUTO: 28.9 PG (ref 27–34)
MCHC RBC AUTO-ENTMCNC: 33.6 G/DL (ref 32–36)
MCV RBC AUTO: 86 FL (ref 80–100)
NITRATE UR QL: NEGATIVE
PH UR STRIP: 5.5 [PH] (ref 5–8)
PLATELET # BLD AUTO: 243 THOU/UL (ref 140–440)
PMV BLD AUTO: 6.5 FL (ref 7–10)
RBC # BLD AUTO: 5.17 MILL/UL (ref 4.4–6.2)
SP GR UR STRIP: 1.02 (ref 1–1.03)
UROBILINOGEN UR STRIP-ACNC: NORMAL
WBC: 9.5 THOU/UL (ref 4–11)

## 2019-06-24 ENCOUNTER — OFFICE VISIT - HEALTHEAST (OUTPATIENT)
Dept: FAMILY MEDICINE | Facility: CLINIC | Age: 54
End: 2019-06-24

## 2019-06-24 ENCOUNTER — RECORDS - HEALTHEAST (OUTPATIENT)
Dept: GENERAL RADIOLOGY | Facility: CLINIC | Age: 54
End: 2019-06-24

## 2019-06-24 ENCOUNTER — RECORDS - HEALTHEAST (OUTPATIENT)
Dept: ADMINISTRATIVE | Facility: OTHER | Age: 54
End: 2019-06-24

## 2019-06-24 DIAGNOSIS — M25.562 ACUTE PAIN OF LEFT KNEE: ICD-10-CM

## 2019-06-24 DIAGNOSIS — J45.20 MILD INTERMITTENT ASTHMA WITHOUT COMPLICATION: ICD-10-CM

## 2019-06-24 DIAGNOSIS — M25.562 PAIN IN LEFT KNEE: ICD-10-CM

## 2019-06-24 RX ORDER — IBUPROFEN 200 MG
200 TABLET ORAL EVERY 6 HOURS PRN
Status: SHIPPED | COMMUNITY
Start: 2019-06-24 | End: 2023-11-27

## 2019-07-18 ENCOUNTER — COMMUNICATION - HEALTHEAST (OUTPATIENT)
Dept: FAMILY MEDICINE | Facility: CLINIC | Age: 54
End: 2019-07-18

## 2019-07-18 DIAGNOSIS — J45.20 MILD INTERMITTENT ASTHMA WITHOUT COMPLICATION: ICD-10-CM

## 2019-08-08 ENCOUNTER — HOSPITAL ENCOUNTER (OUTPATIENT)
Dept: CT IMAGING | Facility: CLINIC | Age: 54
Setting detail: RADIATION/ONCOLOGY SERIES
Discharge: STILL ADMITTED OR ADMINISTRATIVE | End: 2019-08-08
Attending: INTERNAL MEDICINE

## 2019-08-08 DIAGNOSIS — C43.59 MALIGNANT MELANOMA OF SKIN OF TRUNK, EXCEPT SCROTUM (H): ICD-10-CM

## 2019-08-12 ENCOUNTER — AMBULATORY - HEALTHEAST (OUTPATIENT)
Dept: INFUSION THERAPY | Facility: CLINIC | Age: 54
End: 2019-08-12

## 2019-08-12 ENCOUNTER — OFFICE VISIT - HEALTHEAST (OUTPATIENT)
Dept: ONCOLOGY | Facility: CLINIC | Age: 54
End: 2019-08-12

## 2019-08-12 DIAGNOSIS — E21.3 HYPERPARATHYROIDISM (H): ICD-10-CM

## 2019-08-12 DIAGNOSIS — C43.59 MALIGNANT MELANOMA OF SKIN OF TRUNK, EXCEPT SCROTUM (H): ICD-10-CM

## 2019-08-12 LAB
ALBUMIN SERPL-MCNC: 4 G/DL (ref 3.5–5)
ALP SERPL-CCNC: 63 U/L (ref 45–120)
ALT SERPL W P-5'-P-CCNC: 14 U/L (ref 0–45)
ANION GAP SERPL CALCULATED.3IONS-SCNC: 10 MMOL/L (ref 5–18)
AST SERPL W P-5'-P-CCNC: 16 U/L (ref 0–40)
BASOPHILS # BLD AUTO: 0 THOU/UL (ref 0–0.2)
BASOPHILS NFR BLD AUTO: 0 % (ref 0–2)
BILIRUB SERPL-MCNC: 0.7 MG/DL (ref 0–1)
BUN SERPL-MCNC: 14 MG/DL (ref 8–22)
CALCIUM SERPL-MCNC: 9.7 MG/DL (ref 8.5–10.5)
CHLORIDE BLD-SCNC: 105 MMOL/L (ref 98–107)
CO2 SERPL-SCNC: 25 MMOL/L (ref 22–31)
CREAT SERPL-MCNC: 1.01 MG/DL (ref 0.7–1.3)
EOSINOPHIL # BLD AUTO: 0.2 THOU/UL (ref 0–0.4)
EOSINOPHIL NFR BLD AUTO: 4 % (ref 0–6)
ERYTHROCYTE [DISTWIDTH] IN BLOOD BY AUTOMATED COUNT: 12.9 % (ref 11–14.5)
GFR SERPL CREATININE-BSD FRML MDRD: >60 ML/MIN/1.73M2
GLUCOSE BLD-MCNC: 76 MG/DL (ref 70–125)
HCT VFR BLD AUTO: 43.3 % (ref 40–54)
HGB BLD-MCNC: 14.6 G/DL (ref 14–18)
LYMPHOCYTES # BLD AUTO: 1.3 THOU/UL (ref 0.8–4.4)
LYMPHOCYTES NFR BLD AUTO: 25 % (ref 20–40)
MCH RBC QN AUTO: 28.7 PG (ref 27–34)
MCHC RBC AUTO-ENTMCNC: 33.7 G/DL (ref 32–36)
MCV RBC AUTO: 85 FL (ref 80–100)
MONOCYTES # BLD AUTO: 0.3 THOU/UL (ref 0–0.9)
MONOCYTES NFR BLD AUTO: 6 % (ref 2–10)
NEUTROPHILS # BLD AUTO: 3.3 THOU/UL (ref 2–7.7)
NEUTROPHILS NFR BLD AUTO: 65 % (ref 50–70)
PLATELET # BLD AUTO: 210 THOU/UL (ref 140–440)
PMV BLD AUTO: 8.5 FL (ref 8.5–12.5)
POTASSIUM BLD-SCNC: 3.9 MMOL/L (ref 3.5–5)
PROT SERPL-MCNC: 7.8 G/DL (ref 6–8)
RBC # BLD AUTO: 5.08 MILL/UL (ref 4.4–6.2)
SODIUM SERPL-SCNC: 140 MMOL/L (ref 136–145)
WBC: 5.2 THOU/UL (ref 4–11)

## 2019-12-14 ENCOUNTER — OFFICE VISIT - HEALTHEAST (OUTPATIENT)
Dept: FAMILY MEDICINE | Facility: CLINIC | Age: 54
End: 2019-12-14

## 2019-12-14 DIAGNOSIS — R03.0 ELEVATED BLOOD PRESSURE READING WITHOUT DIAGNOSIS OF HYPERTENSION: ICD-10-CM

## 2019-12-14 DIAGNOSIS — J01.00 ACUTE NON-RECURRENT MAXILLARY SINUSITIS: ICD-10-CM

## 2019-12-22 ENCOUNTER — COMMUNICATION - HEALTHEAST (OUTPATIENT)
Dept: ENDOCRINOLOGY | Facility: CLINIC | Age: 54
End: 2019-12-22

## 2019-12-22 DIAGNOSIS — E03.9 HYPOTHYROIDISM, UNSPECIFIED TYPE: ICD-10-CM

## 2019-12-23 ENCOUNTER — COMMUNICATION - HEALTHEAST (OUTPATIENT)
Dept: SCHEDULING | Facility: CLINIC | Age: 54
End: 2019-12-23

## 2019-12-23 ENCOUNTER — COMMUNICATION - HEALTHEAST (OUTPATIENT)
Dept: ENDOCRINOLOGY | Facility: CLINIC | Age: 54
End: 2019-12-23

## 2019-12-23 DIAGNOSIS — E03.9 HYPOTHYROIDISM, UNSPECIFIED TYPE: ICD-10-CM

## 2019-12-26 ENCOUNTER — HOSPITAL ENCOUNTER (OUTPATIENT)
Dept: CT IMAGING | Facility: CLINIC | Age: 54
Discharge: HOME | End: 2019-12-26
Attending: FAMILY MEDICINE

## 2019-12-26 ENCOUNTER — COMMUNICATION - HEALTHEAST (OUTPATIENT)
Dept: TELEHEALTH | Facility: CLINIC | Age: 54
End: 2019-12-26

## 2019-12-26 ENCOUNTER — OFFICE VISIT - HEALTHEAST (OUTPATIENT)
Dept: FAMILY MEDICINE | Facility: CLINIC | Age: 54
End: 2019-12-26

## 2019-12-26 DIAGNOSIS — K57.32 DIVERTICULITIS OF COLON: ICD-10-CM

## 2019-12-27 ENCOUNTER — AMBULATORY - HEALTHEAST (OUTPATIENT)
Dept: FAMILY MEDICINE | Facility: CLINIC | Age: 54
End: 2019-12-27

## 2019-12-27 DIAGNOSIS — K57.32 DIVERTICULITIS OF COLON: ICD-10-CM

## 2020-01-13 ENCOUNTER — OFFICE VISIT - HEALTHEAST (OUTPATIENT)
Dept: SURGERY | Facility: CLINIC | Age: 55
End: 2020-01-13

## 2020-01-13 ENCOUNTER — SURGERY - HEALTHEAST (OUTPATIENT)
Dept: SURGERY | Facility: CLINIC | Age: 55
End: 2020-01-13

## 2020-01-13 DIAGNOSIS — K57.90 DIVERTICULOSIS: ICD-10-CM

## 2020-02-07 ENCOUNTER — AMBULATORY - HEALTHEAST (OUTPATIENT)
Dept: SURGERY | Facility: CLINIC | Age: 55
End: 2020-02-07

## 2020-02-07 DIAGNOSIS — Z79.2 PROPHYLACTIC ANTIBIOTIC: ICD-10-CM

## 2020-02-10 ENCOUNTER — COMMUNICATION - HEALTHEAST (OUTPATIENT)
Dept: SURGERY | Facility: CLINIC | Age: 55
End: 2020-02-10

## 2020-02-10 ENCOUNTER — OFFICE VISIT - HEALTHEAST (OUTPATIENT)
Dept: FAMILY MEDICINE | Facility: CLINIC | Age: 55
End: 2020-02-10

## 2020-02-10 DIAGNOSIS — E21.0 PRIMARY HYPERPARATHYROIDISM (H): ICD-10-CM

## 2020-02-10 DIAGNOSIS — Z01.818 ENCOUNTER FOR PREOPERATIVE EXAMINATION FOR GENERAL SURGICAL PROCEDURE: ICD-10-CM

## 2020-02-10 DIAGNOSIS — C43.59 MALIGNANT MELANOMA OF SKIN OF TRUNK, EXCEPT SCROTUM (H): ICD-10-CM

## 2020-02-10 DIAGNOSIS — Z87.19 HISTORY OF DIVERTICULITIS: ICD-10-CM

## 2020-02-10 DIAGNOSIS — E89.0 POSTABLATIVE HYPOTHYROIDISM: ICD-10-CM

## 2020-02-10 LAB
ALBUMIN SERPL-MCNC: 4 G/DL (ref 3.5–5)
ALP SERPL-CCNC: 65 U/L (ref 45–120)
ALT SERPL W P-5'-P-CCNC: 20 U/L (ref 0–45)
ANION GAP SERPL CALCULATED.3IONS-SCNC: 9 MMOL/L (ref 5–18)
AST SERPL W P-5'-P-CCNC: 20 U/L (ref 0–40)
BASOPHILS # BLD AUTO: 0 THOU/UL (ref 0–0.2)
BASOPHILS NFR BLD AUTO: 1 % (ref 0–2)
BILIRUB SERPL-MCNC: 0.5 MG/DL (ref 0–1)
BUN SERPL-MCNC: 15 MG/DL (ref 8–22)
CALCIUM SERPL-MCNC: 10.1 MG/DL (ref 8.5–10.5)
CHLORIDE BLD-SCNC: 103 MMOL/L (ref 98–107)
CO2 SERPL-SCNC: 29 MMOL/L (ref 22–31)
CREAT SERPL-MCNC: 0.95 MG/DL (ref 0.7–1.3)
EOSINOPHIL # BLD AUTO: 0.3 THOU/UL (ref 0–0.4)
EOSINOPHIL NFR BLD AUTO: 4 % (ref 0–6)
ERYTHROCYTE [DISTWIDTH] IN BLOOD BY AUTOMATED COUNT: 12.6 % (ref 11–14.5)
GFR SERPL CREATININE-BSD FRML MDRD: >60 ML/MIN/1.73M2
GLUCOSE BLD-MCNC: 81 MG/DL (ref 70–125)
HCT VFR BLD AUTO: 44.9 % (ref 40–54)
HGB BLD-MCNC: 15.3 G/DL (ref 14–18)
LYMPHOCYTES # BLD AUTO: 2 THOU/UL (ref 0.8–4.4)
LYMPHOCYTES NFR BLD AUTO: 31 % (ref 20–40)
MCH RBC QN AUTO: 29.6 PG (ref 27–34)
MCHC RBC AUTO-ENTMCNC: 34 G/DL (ref 32–36)
MCV RBC AUTO: 87 FL (ref 80–100)
MONOCYTES # BLD AUTO: 0.4 THOU/UL (ref 0–0.9)
MONOCYTES NFR BLD AUTO: 7 % (ref 2–10)
NEUTROPHILS # BLD AUTO: 3.7 THOU/UL (ref 2–7.7)
NEUTROPHILS NFR BLD AUTO: 58 % (ref 50–70)
PLATELET # BLD AUTO: 278 THOU/UL (ref 140–440)
PMV BLD AUTO: 6.9 FL (ref 7–10)
POTASSIUM BLD-SCNC: 4.6 MMOL/L (ref 3.5–5)
PROT SERPL-MCNC: 7.7 G/DL (ref 6–8)
RBC # BLD AUTO: 5.17 MILL/UL (ref 4.4–6.2)
SODIUM SERPL-SCNC: 141 MMOL/L (ref 136–145)
T4 FREE SERPL-MCNC: 1.4 NG/DL (ref 0.7–1.8)
TSH SERPL DL<=0.005 MIU/L-ACNC: 5.78 UIU/ML (ref 0.3–5)
WBC: 6.4 THOU/UL (ref 4–11)

## 2020-02-10 ASSESSMENT — MIFFLIN-ST. JEOR: SCORE: 1705.13

## 2020-02-11 LAB
ATRIAL RATE - MUSE: 75 BPM
DIASTOLIC BLOOD PRESSURE - MUSE: NORMAL
INTERPRETATION ECG - MUSE: NORMAL
P AXIS - MUSE: 70 DEGREES
PR INTERVAL - MUSE: 164 MS
QRS DURATION - MUSE: 86 MS
QT - MUSE: 404 MS
QTC - MUSE: 451 MS
R AXIS - MUSE: 51 DEGREES
SYSTOLIC BLOOD PRESSURE - MUSE: NORMAL
T AXIS - MUSE: 48 DEGREES
VENTRICULAR RATE- MUSE: 75 BPM

## 2020-02-13 ENCOUNTER — ANESTHESIA - HEALTHEAST (OUTPATIENT)
Dept: SURGERY | Facility: HOSPITAL | Age: 55
End: 2020-02-13

## 2020-02-14 ENCOUNTER — SURGERY - HEALTHEAST (OUTPATIENT)
Dept: SURGERY | Facility: HOSPITAL | Age: 55
End: 2020-02-14

## 2020-02-14 ASSESSMENT — MIFFLIN-ST. JEOR: SCORE: 1669.53

## 2020-02-16 ASSESSMENT — MIFFLIN-ST. JEOR
SCORE: 1674.98
SCORE: 1662.28

## 2020-02-17 ASSESSMENT — MIFFLIN-ST. JEOR: SCORE: 1663.18

## 2020-03-02 ENCOUNTER — OFFICE VISIT - HEALTHEAST (OUTPATIENT)
Dept: SURGERY | Facility: CLINIC | Age: 55
End: 2020-03-02

## 2020-03-02 DIAGNOSIS — Z48.89 POSTOPERATIVE VISIT: ICD-10-CM

## 2020-04-30 ENCOUNTER — COMMUNICATION - HEALTHEAST (OUTPATIENT)
Dept: SCHEDULING | Facility: CLINIC | Age: 55
End: 2020-04-30

## 2020-04-30 ENCOUNTER — COMMUNICATION - HEALTHEAST (OUTPATIENT)
Dept: ENDOCRINOLOGY | Facility: CLINIC | Age: 55
End: 2020-04-30

## 2020-04-30 DIAGNOSIS — E03.9 HYPOTHYROIDISM, UNSPECIFIED TYPE: ICD-10-CM

## 2020-05-01 RX ORDER — LEVOTHYROXINE SODIUM 200 UG/1
TABLET ORAL
Qty: 40 TABLET | Refills: 2 | Status: SHIPPED | OUTPATIENT
Start: 2020-05-01 | End: 2021-12-01

## 2020-06-22 ENCOUNTER — COMMUNICATION - HEALTHEAST (OUTPATIENT)
Dept: ADMINISTRATIVE | Facility: HOSPITAL | Age: 55
End: 2020-06-22

## 2020-07-10 ENCOUNTER — COMMUNICATION - HEALTHEAST (OUTPATIENT)
Dept: ADMINISTRATIVE | Facility: HOSPITAL | Age: 55
End: 2020-07-10

## 2020-08-14 ENCOUNTER — HOSPITAL ENCOUNTER (OUTPATIENT)
Dept: CT IMAGING | Facility: CLINIC | Age: 55
Setting detail: RADIATION/ONCOLOGY SERIES
Discharge: STILL ADMITTED OR ADMINISTRATIVE | End: 2020-08-14
Attending: INTERNAL MEDICINE

## 2020-08-14 ENCOUNTER — AMBULATORY - HEALTHEAST (OUTPATIENT)
Dept: INFUSION THERAPY | Facility: CLINIC | Age: 55
End: 2020-08-14

## 2020-08-14 DIAGNOSIS — C43.59 MALIGNANT MELANOMA OF SKIN OF TRUNK, EXCEPT SCROTUM (H): ICD-10-CM

## 2020-08-14 LAB
ALBUMIN SERPL-MCNC: 4 G/DL (ref 3.5–5)
ALP SERPL-CCNC: 61 U/L (ref 45–120)
ALT SERPL W P-5'-P-CCNC: 20 U/L (ref 0–45)
ANION GAP SERPL CALCULATED.3IONS-SCNC: 10 MMOL/L (ref 5–18)
AST SERPL W P-5'-P-CCNC: ABNORMAL U/L
BASOPHILS # BLD AUTO: 0 THOU/UL (ref 0–0.2)
BASOPHILS NFR BLD AUTO: 1 % (ref 0–2)
BILIRUB SERPL-MCNC: 0.5 MG/DL (ref 0–1)
BUN SERPL-MCNC: 16 MG/DL (ref 8–22)
CALCIUM SERPL-MCNC: 9.5 MG/DL (ref 8.5–10.5)
CHLORIDE BLD-SCNC: 102 MMOL/L (ref 98–107)
CO2 SERPL-SCNC: 26 MMOL/L (ref 22–31)
CREAT SERPL-MCNC: 1.08 MG/DL (ref 0.7–1.3)
EOSINOPHIL # BLD AUTO: 0.2 THOU/UL (ref 0–0.4)
EOSINOPHIL NFR BLD AUTO: 5 % (ref 0–6)
ERYTHROCYTE [DISTWIDTH] IN BLOOD BY AUTOMATED COUNT: 13 % (ref 11–14.5)
GFR SERPL CREATININE-BSD FRML MDRD: >60 ML/MIN/1.73M2
GLUCOSE BLD-MCNC: 98 MG/DL (ref 70–125)
HCT VFR BLD AUTO: 44.1 % (ref 40–54)
HGB BLD-MCNC: 14.8 G/DL (ref 14–18)
LYMPHOCYTES # BLD AUTO: 1.6 THOU/UL (ref 0.8–4.4)
LYMPHOCYTES NFR BLD AUTO: 33 % (ref 20–40)
MCH RBC QN AUTO: 29 PG (ref 27–34)
MCHC RBC AUTO-ENTMCNC: 33.6 G/DL (ref 32–36)
MCV RBC AUTO: 87 FL (ref 80–100)
MONOCYTES # BLD AUTO: 0.4 THOU/UL (ref 0–0.9)
MONOCYTES NFR BLD AUTO: 8 % (ref 2–10)
NEUTROPHILS # BLD AUTO: 2.6 THOU/UL (ref 2–7.7)
NEUTROPHILS NFR BLD AUTO: 54 % (ref 50–70)
PLATELET # BLD AUTO: 206 THOU/UL (ref 140–440)
PMV BLD AUTO: 8.4 FL (ref 8.5–12.5)
POTASSIUM BLD-SCNC: ABNORMAL MMOL/L
PROT SERPL-MCNC: 8.4 G/DL (ref 6–8)
RBC # BLD AUTO: 5.1 MILL/UL (ref 4.4–6.2)
SODIUM SERPL-SCNC: 138 MMOL/L (ref 136–145)
WBC: 4.8 THOU/UL (ref 4–11)

## 2020-08-31 ENCOUNTER — OFFICE VISIT - HEALTHEAST (OUTPATIENT)
Dept: ONCOLOGY | Facility: CLINIC | Age: 55
End: 2020-08-31

## 2020-08-31 DIAGNOSIS — C43.59 MALIGNANT MELANOMA OF SKIN OF TRUNK, EXCEPT SCROTUM (H): ICD-10-CM

## 2020-12-31 ENCOUNTER — COMMUNICATION - HEALTHEAST (OUTPATIENT)
Dept: FAMILY MEDICINE | Facility: CLINIC | Age: 55
End: 2020-12-31

## 2020-12-31 DIAGNOSIS — J45.20 MILD INTERMITTENT ASTHMA WITHOUT COMPLICATION: ICD-10-CM

## 2020-12-31 DIAGNOSIS — E03.9 HYPOTHYROIDISM, UNSPECIFIED TYPE: ICD-10-CM

## 2021-01-03 RX ORDER — LEVOTHYROXINE SODIUM 175 UG/1
TABLET ORAL
Qty: 48 TABLET | Refills: 0 | Status: SHIPPED | OUTPATIENT
Start: 2021-01-03 | End: 2021-12-01

## 2021-01-03 RX ORDER — ALBUTEROL SULFATE 90 UG/1
AEROSOL, METERED RESPIRATORY (INHALATION)
Qty: 18 G | Refills: 0 | Status: SHIPPED | OUTPATIENT
Start: 2021-01-03 | End: 2021-12-29

## 2021-05-26 ENCOUNTER — RECORDS - HEALTHEAST (OUTPATIENT)
Dept: ADMINISTRATIVE | Facility: CLINIC | Age: 56
End: 2021-05-26

## 2021-05-27 ENCOUNTER — RECORDS - HEALTHEAST (OUTPATIENT)
Dept: ADMINISTRATIVE | Facility: CLINIC | Age: 56
End: 2021-05-27

## 2021-05-27 NOTE — PROGRESS NOTES
Chief Complaint   Patient presents with     Abdominal Pain     lower abdominal x 2 days pain states Hx of diverticulitis.          HPI      Patient is here for 2 days of moderate lower abdominal pain, initially intermittent but today is constant. He had a few episodes of loose stools yesterday. No fever, chills, urinary symptoms, nausea, vomiting. He has hx of diverticulitis and renal calculus. NO hx of abdominal surgeries.     ROS: Pertinent ROS noted in HPI.     Allergies   Allergen Reactions     Penicillins Shortness Of Breath and Swelling     Dilaudid [Hydromorphone] Dizziness       Patient Active Problem List   Diagnosis     Multiple Environmental Allergies     Mild intermittent asthma     Pain In The Hands     Hypothyroidism     Graves' Disease     Allergic Rhinitis     Lump In / On The Skin     Malignant Melanoma Of The Back     Dysphagia     History of diverticulitis     Hypercalcemia     Asymptomatic gallstones     Urinary tract stones     Hyperparathyroidism (H)       Family History   Problem Relation Age of Onset     Hypertension Mother      Heart disease Mother         pacemaker     Cancer Maternal Aunt         lung     Urolithiasis Maternal Uncle      Lung disease Maternal Uncle         asthma       Social History     Socioeconomic History     Marital status:      Spouse name: Not on file     Number of children: Not on file     Years of education: Not on file     Highest education level: Not on file   Occupational History     Occupation: [employer]   Social Needs     Financial resource strain: Not on file     Food insecurity:     Worry: Not on file     Inability: Not on file     Transportation needs:     Medical: Not on file     Non-medical: Not on file   Tobacco Use     Smoking status: Never Smoker     Smokeless tobacco: Never Used   Substance and Sexual Activity     Alcohol use: No     Drug use: No     Sexual activity: Yes     Birth control/protection: Surgical   Lifestyle     Physical activity:      Days per week: Not on file     Minutes per session: Not on file     Stress: Not on file   Relationships     Social connections:     Talks on phone: Not on file     Gets together: Not on file     Attends Confucianist service: Not on file     Active member of club or organization: Not on file     Attends meetings of clubs or organizations: Not on file     Relationship status: Not on file     Intimate partner violence:     Fear of current or ex partner: Not on file     Emotionally abused: Not on file     Physically abused: Not on file     Forced sexual activity: Not on file   Other Topics Concern     Not on file   Social History Narrative     Not on file         Objective:    Vitals:    04/13/19 0811   BP: 138/83   Pulse: 82   Temp: 98.8  F (37.1  C)   SpO2: 99%       Gen: well appearing  CV: RRR, normal S1S2, no M, R, G  Pulm: CTAB, normal effort  Abd: Normal inspection, normal bowel sounds, soft, moderate pain to palpation at RLQ and infraumbilicus, mild pain , no mass/HSM    Recent Results (from the past 24 hour(s))   HM2(CBC w/o Differential)   Result Value Ref Range    WBC 9.5 4.0 - 11.0 thou/uL    RBC 5.17 4.40 - 6.20 mill/uL    Hemoglobin 14.9 14.0 - 18.0 g/dL    Hematocrit 44.4 40.0 - 54.0 %    MCV 86 80 - 100 fL    MCH 28.9 27.0 - 34.0 pg    MCHC 33.6 32.0 - 36.0 g/dL    RDW 11.9 11.0 - 14.5 %    Platelets 243 140 - 440 thou/uL    MPV 6.5 (L) 7.0 - 10.0 fL   Urinalysis-UC if Indicated   Result Value Ref Range    Color, UA Yellow Colorless, Yellow, Straw, Light Yellow    Clarity, UA Clear Clear    Glucose, UA Negative Negative    Bilirubin, UA Negative Negative    Ketones, UA Negative Negative    Specific Gravity, UA 1.025 1.005 - 1.030    Blood, UA Negative Negative    pH, UA 5.5 5.0 - 8.0    Protein, UA Negative Negative mg/dL    Urobilinogen, UA 0.2 E.U./dL 0.2 E.U./dL, 1.0 E.U./dL    Nitrite, UA Negative Negative    Leukocytes, UA Negative Negative       Ct Abdomen Pelvis With Oral With Iv  Contrast    Result Date: 4/13/2019  EXAM: CT ABDOMEN PELVIS W ORAL W IV CONTRAST LOCATION: [facility] DATE/TIME: 4/13/2019 10:32 AM INDICATION: Lower abdominal pain. History of melanoma. COMPARISON: 02/04/2019. TECHNIQUE: Helical enhanced thin-section CT scan of the abdomen and pelvis was performed following injection of IV contrast. Multiplanar reformats were obtained. Dose reduction techniques were used. CONTRAST: Iohexol (Omni) 100 mL. FINDINGS: LUNG BASES: Small hiatal hernia. ABDOMEN: Cholelithiasis. Unremarkable liver, pancreas, spleen, adrenals and kidneys. No free air or adenopathy. PELVIS: Sigmoid predominant colonic diverticulosis with wall thickening and stranding proximally. No other significant bowel wall thickening or dilatation. No obstruction. Normal appendix. MUSCULOSKELETAL: Redemonstrated disc space narrowing pronounced at L5-S1 (moderate to severe).     CONCLUSION: 1.  Acute diverticulitis proximal sigmoid colon. No free air or abscess. 2.  Normal appendix. NOTE: ABNORMAL REPORT THE DICTATION ABOVE DESCRIBES AN ABNORMALITY FOR WHICH FOLLOW-UP IS NEEDED. Findings verbally communicated to Dr. Henriquez at 10:40 AM on 04/13/2019.       Acute diverticulitis of intestine - advised close f/u in ER if symptoms escalate.   -     ciprofloxacin HCl (CIPRO) 500 MG tablet; Take 1 tablet (500 mg total) by mouth 2 (two) times a day for 10 days.  -     metroNIDAZOLE (FLAGYL) 500 MG tablet; Take 1 tablet (500 mg total) by mouth 3 (three) times a day for 10 days.    Abdominal pain, unspecified abdominal location  -     HM2(CBC w/o Differential)  -     Urinalysis-UC if Indicated  -     CT Abdomen Pelvis With Oral With IV Contrast; Future

## 2021-05-28 ASSESSMENT — ASTHMA QUESTIONNAIRES: ACT_TOTALSCORE: 25

## 2021-05-29 NOTE — PROGRESS NOTES
ASSESSMENT/PLAN:       1. Acute pain of left knee  -X-ray appears relatively unremarkable, updated labs as well to see if there is any signs of gout.  If his pain is not improving, he can certainly let me know and we could consider an MRI versus referral to orthopedics for further evaluation and assessment and to look for signs of meniscal injury or ligamentous injury.  In the meantime he will start using a neoprene knee sleeve to see if this helps with his discomfort.  - XR Knee Left 1 or 2 VWS; Future  - Comprehensive Metabolic Panel; Future  - Uric Acid; Future  - HM1(CBC and Differential); Future  - Erythrocyte Sedimentation Rate; Future    2. Mild intermittent asthma without complication  -Updated his asthma action plan and asthma control test today, he is doing well.  He will follow-up here in 6 months.      Return in about 6 months (around 12/24/2019) for Annual physical.      Danuta Galicia MD      PROGRESS NOTE   6/24/2019    SUBJECTIVE:  Omi Grimm is a 53 y.o. male  who presents for knee pain.     He has had pain on the left side for at least 3 week to a month. It hurts with bending. It will get sore with things like mowing the lawn. They were at the car show over the weekend and he walked all day and it got sore. When it is sore he cant' bend down due to the pain. He does ok at work though.     It does not catch or lock. It does not give out on him. I fhe is down bending over he nicanor use his arms to push back up.   Chief Complaint   Patient presents with     Knee Pain     Patient has been having left side knee pain while bending for the pass three weeks. No swelling, no injury.          Patient Active Problem List   Diagnosis     Multiple Environmental Allergies     Mild intermittent asthma     Pain In The Hands     Hypothyroidism     Graves' Disease     Allergic Rhinitis     Lump In / On The Skin     Malignant Melanoma Of The Back     Dysphagia     History of diverticulitis      Hypercalcemia     Asymptomatic gallstones     Urinary tract stones     Hyperparathyroidism (H)       Current Outpatient Medications   Medication Sig Dispense Refill     esomeprazole magnesium (NEXIUM 24 HR) 22.3 mg capsule Take 22.3 mg by mouth daily as needed.       ibuprofen (ADVIL,MOTRIN) 200 MG tablet Take 200 mg by mouth every 6 (six) hours as needed for pain.       levothyroxine (SYNTHROID, LEVOTHROID) 175 MCG tablet Take 1 tab on tues, thurs, Saturday and Sunday. 48 tablet 2     levothyroxine (SYNTHROID, LEVOTHROID) 200 MCG tablet Take 1 tab on Monday, Wednesday and Friday. 40 tablet 1     VENTOLIN HFA 90 mcg/actuation inhaler Inhale two (2) puff(s) every 4 hours as needed 18 g 0     calcium polycarbophil (FIBERCON) 625 mg tablet Take 625 mg by mouth daily.       No current facility-administered medications for this visit.        Social History     Tobacco Use   Smoking Status Never Smoker   Smokeless Tobacco Never Used           OBJECTIVE:        No results found for this or any previous visit (from the past 240 hour(s)).    Vitals:    06/24/19 1159   BP: 120/76   Patient Site: Left Arm   Patient Position: Sitting   Cuff Size: Adult Regular   Pulse: 72   SpO2: 99%   Weight: 181 lb 3.2 oz (82.2 kg)     Weight: 181 lb 3.2 oz (82.2 kg)          Physical Exam:  GENERAL APPEARANCE: A&A, NAD, well hydrated, well nourished  SKIN:  Normal skin turgor, no lesions/rashes   HEENT: moist mucous membranes, no rhinorrhea  NECK: Normal  EXTREMITY: no edema, left knee without tenderness palpation of the joint lines, normal anterior posterior drawer testing, normal varus and valgus stress testing and no obvious meniscal issues, no swelling  NEURO: no gross deficits

## 2021-05-30 VITALS — BODY MASS INDEX: 24.52 KG/M2 | HEIGHT: 72 IN | WEIGHT: 181 LBS

## 2021-05-30 VITALS — WEIGHT: 180.3 LBS | BODY MASS INDEX: 24.45 KG/M2

## 2021-05-30 VITALS — WEIGHT: 181.4 LBS | BODY MASS INDEX: 24.6 KG/M2

## 2021-05-30 NOTE — TELEPHONE ENCOUNTER
Refill Approved    Rx renewed per Medication Renewal Policy. Medication was last renewed on 8/24/17.    Yaz Cooper, [facility] Triage/Med Refill 7/18/2019     Requested Prescriptions   Pending Prescriptions Disp Refills     PROAIR HFA 90 mcg/actuation inhaler [Pharmacy Med Name: PROAIR HFA AER] 8.5 g 1     Sig: INHALE TWO (2) PUFF(S) EVERY 4 HOURS AS NEEDED       Albuterol/Levalbuterol Refill Protocol Passed - 7/18/2019  8:40 AM        Passed - PCP or prescribing provider visit in last year     Last office visit with prescriber/PCP: 1/12/2017 Slick Bose MD OR same dept: 6/24/2019 Danuta Galicia MD OR same specialty: 6/24/2019 Danuta Galicia MD Last physical: Visit date not found       Next appt within 3 mo: Visit date not found  Next physical within 3 mo: Visit date not found  Prescriber OR PCP: Slick Bose MD  Last diagnosis associated with med order: 1. Mild intermittent asthma without complication  - PROAIR HFA 90 mcg/actuation inhaler [Pharmacy Med Name: PROAIR HFA AER]; INHALE TWO (2) PUFF(S) EVERY 4 HOURS AS NEEDED  Dispense: 8.5 g; Refill: 1    If protocol passes may refill for 6 months if within 3 months of last provider visit (or a total of 9 months). If patient requesting >1 inhaler per month refill x 6 months and have patient make appointment with provider.

## 2021-05-31 VITALS — HEIGHT: 72 IN | WEIGHT: 178 LBS | BODY MASS INDEX: 24.11 KG/M2

## 2021-05-31 VITALS — WEIGHT: 179 LBS | BODY MASS INDEX: 24.28 KG/M2

## 2021-05-31 VITALS — HEIGHT: 72 IN | BODY MASS INDEX: 24.95 KG/M2 | WEIGHT: 184.2 LBS

## 2021-05-31 VITALS — WEIGHT: 172.9 LBS | BODY MASS INDEX: 23.45 KG/M2

## 2021-05-31 VITALS — WEIGHT: 176.6 LBS | BODY MASS INDEX: 23.95 KG/M2

## 2021-05-31 VITALS — BODY MASS INDEX: 23.61 KG/M2 | WEIGHT: 174.3 LBS | HEIGHT: 72 IN

## 2021-05-31 VITALS — WEIGHT: 182.3 LBS | BODY MASS INDEX: 24.72 KG/M2

## 2021-05-31 NOTE — PROGRESS NOTES
[facility] H[facility]ology [facility] Oncology Progress Note    Patient: Omi Grimm  MRN: [id number]  Date of Service: August 12, 2019      Assessment and Plan:    1.  Melanoma: He is now 5 years out from definitive treatment.  Clinically and radiographically no evidence of recurrence.  He is doing well.  We will see him back in a year for follow-up.  I asked him to call us sooner if he develops any headaches, pain, chest pain, cough, shortness of breath, or any other persistent symptoms that does not go away.  He voiced understanding.  I told him at this point is likelihood of recurrence is quite low.  Stressed continued yearly dermatology follow-up.    ECOG Performance   ECOG Performance Status: 00    Distress Assessment  Distress Assessment Score: No distress0    Pain  Currently in Pain: No/denies    Diagnosis:    Melanoma involving the back: Gee level IV. 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 Stage [id number]. IIC. Diagnosed June, 2014.    Treatment:    Wide excision and sentinel node biopsy in June 5, 2014. No adjuvant therapy was given.    Interim History:    Omi returns for follow-up visit.  He comes in for a 6 month follow-up visit.  Continues to do well.  Occasional left knee pain.  No other bony pains.  No headaches.  Energy and appetite are good.    Review of Systems:    Constitutional  Fatigue: None  Fever: None  Chills: None  Weight Gain: None  Weight Loss: None  Neurosensory  Neurosensory (WDL): All neurosensory elements are within defined limits  Cardiovascular  Cardiovascular (WDL): All cardiovascular elements are within defined limits  Pulmonary  Respiratory (WDL): Within Defined Limits  Gastrointestinal  Anorexia: None  Constipation: None  Diarrhea: None  Dysphagia: Symptomatic, able to eat regular diet(on Nexium)  Esophagitis: Asymptomatic, clinical or diagnostic observations only, intervention not indicated  Nausea: None  Pharyngitis: None  Vomiting: None  Dysgeusia: None  Dry Mouth: None  Genitourinary  Genitourinary  (WDL): All genitourinary elements are within defined limits  Integumentary  Integumentary (WDL): All integumentary elements are within defined limits  Patient Coping  Patient Coping: Accepting  Accompanied by  Accompanied by: Alone    Past History:    Past Medical History:   Diagnosis Date     Acute bronchitis      Allergic rhinitis      Asthma      Graves disease      Hypertension      Kidney stone 11/06/2017    first stone at age 52     Malignant melanoma of skin of trunk, except scrotum (H)     removed from back 2015     Unspecified hypothyroidism      Physical Exam:    Recent Vitals 8/12/2019   Weight 180 lbs 6 oz   BSA (m2) 2.04 m2   /90   Pulse 74   Temp 98.6   Temp src 1   SpO2 98   Some recent data might be hidden     General: patient appears stated age of 53 y.o.. Nontoxic and in no distress.   HEENT: Head: atraumatic, normocephalic. Sclerae anicteric.  Chest:  Normal respiratory effort  Cardiac:  No edema.   Abdomen: abdomen is non-distended  Extremities: normal tone and muscle bulk.  Skin: no lesions or rash. Warm and dry.   CNS: alert and oriented.  Psychiatric: normal mood and affect.     Lab Results:    Recent Results (from the past 168 hour(s))   Comprehensive Metabolic Panel   Result Value Ref Range    Sodium 140 136 - 145 mmol/L    Potassium 3.9 3.5 - 5.0 mmol/L    Chloride 105 98 - 107 mmol/L    CO2 25 22 - 31 mmol/L    Anion Gap, Calculation 10 5 - 18 mmol/L    Glucose 76 70 - 125 mg/dL    BUN 14 8 - 22 mg/dL    Creatinine 1.01 0.70 - 1.30 mg/dL    GFR MDRD Af Amer >60 >60 mL/min/1.73m2    GFR MDRD Non Af Amer >60 >60 mL/min/1.73m2    Bilirubin, Total 0.7 0.0 - 1.0 mg/dL    Calcium 9.7 8.5 - 10.5 mg/dL    Protein, Total 7.8 6.0 - 8.0 g/dL    Albumin 4.0 3.5 - 5.0 g/dL    Alkaline Phosphatase 63 45 - 120 U/L    AST 16 0 - 40 U/L    ALT 14 0 - 45 U/L   HM1 (CBC with Diff)   Result Value Ref Range    WBC 5.2 4.0 - 11.0 thou/uL    RBC 5.08 4.40 - 6.20 mill/uL    Hemoglobin 14.6 14.0 - 18.0 g/dL     Hematocrit 43.3 40.0 - 54.0 %    MCV 85 80 - 100 fL    MCH 28.7 27.0 - 34.0 pg    MCHC 33.7 32.0 - 36.0 g/dL    RDW 12.9 11.0 - 14.5 %    Platelets 210 140 - 440 thou/uL    MPV 8.5 8.5 - 12.5 fL    Neutrophils % 65 50 - 70 %    Lymphocytes % 25 20 - 40 %    Monocytes % 6 2 - 10 %    Eosinophils % 4 0 - 6 %    Basophils % 0 0 - 2 %    Neutrophils Absolute 3.3 2.0 - 7.7 thou/uL    Lymphocytes Absolute 1.3 0.8 - 4.4 thou/uL    Monocytes Absolute 0.3 0.0 - 0.9 thou/uL    Eosinophils Absolute 0.2 0.0 - 0.4 thou/uL    Basophils Absolute 0.0 0.0 - 0.2 thou/uL     Imaging:    I personally reviewed his CT scan images.  No evidence of recurrent disease.    Ct Chest Abdomen Pelvis Without Oral With Iv Contrast    Result Date: 8/8/2019  EXAM: CT CHEST ABDOMEN PELVIS WO ORAL W IV CONTRAST LOCATION: [facility] DATE/TIME: 8/8/2019 7:46 AM INDICATION: melanoma f/u COMPARISON: CT of the chest, abdomen, and pelvis 2/4/2019. CT of the abdomen and pelvis 4/13/2019. TECHNIQUE: Helical thin-section CT scan of the chest, abdomen, and pelvis was performed following injection of IV contrast. Multiplanar reformats were obtained. Dose reduction techniques were used. CONTRAST: Iohexol (Omni) 100 mL FINDINGS: CHEST: Mild chronic scarring of the lung apices. A few tiny 2 mm smaller benign calcific granulomata of both lungs are again demonstrated. No new or concerning pulmonary nodules. No thoracic lymphadenopathy. No pleural or pericardial effusion. Small hiatal hernia.  ABDOMEN: Cholelithiasis. No pericholecystic inflammation. Liver, bile ducts, spleen, pancreas, adrenal glands, and kidneys are negative. PELVIS: No free fluid or lymphadenopathy. Colonic diverticulosis. No dilatation or inflammation of large or small bowel. Normal appendix. Post vasectomy. MUSCULOSKELETAL: No concerning lytic or blastic bone lesions. Advanced degenerative disc disease at L5-S1.     CONCLUSION: No evidence of metastatic disease in the chest, abdomen or  pelvis.      Signed by: Kane Mann MD

## 2021-06-01 VITALS — BODY MASS INDEX: 24.16 KG/M2 | HEIGHT: 72 IN | WEIGHT: 178.4 LBS

## 2021-06-02 VITALS — BODY MASS INDEX: 24.14 KG/M2 | HEIGHT: 72 IN | WEIGHT: 178.2 LBS

## 2021-06-02 VITALS — BODY MASS INDEX: 24.55 KG/M2 | WEIGHT: 181 LBS

## 2021-06-02 VITALS — BODY MASS INDEX: 25.14 KG/M2 | WEIGHT: 185.4 LBS

## 2021-06-02 VITALS — WEIGHT: 179.8 LBS | BODY MASS INDEX: 24.35 KG/M2 | HEIGHT: 72 IN

## 2021-06-02 VITALS — HEIGHT: 72 IN | WEIGHT: 175 LBS | BODY MASS INDEX: 23.7 KG/M2

## 2021-06-02 VITALS — BODY MASS INDEX: 24.29 KG/M2 | WEIGHT: 179.06 LBS

## 2021-06-02 VITALS — HEIGHT: 72 IN | WEIGHT: 180.2 LBS | BODY MASS INDEX: 24.41 KG/M2

## 2021-06-02 VITALS — BODY MASS INDEX: 24.24 KG/M2 | WEIGHT: 179 LBS | HEIGHT: 72 IN

## 2021-06-02 VITALS — WEIGHT: 184.6 LBS | BODY MASS INDEX: 25.04 KG/M2

## 2021-06-03 VITALS — BODY MASS INDEX: 24.58 KG/M2 | WEIGHT: 181.2 LBS

## 2021-06-03 VITALS — BODY MASS INDEX: 24.47 KG/M2 | WEIGHT: 180.4 LBS

## 2021-06-04 VITALS
HEIGHT: 72 IN | BODY MASS INDEX: 24.94 KG/M2 | WEIGHT: 184.1 LBS | HEART RATE: 76 BPM | SYSTOLIC BLOOD PRESSURE: 130 MMHG | OXYGEN SATURATION: 100 % | DIASTOLIC BLOOD PRESSURE: 88 MMHG

## 2021-06-04 VITALS — HEART RATE: 79 BPM | WEIGHT: 182 LBS | OXYGEN SATURATION: 98 % | BODY MASS INDEX: 24.68 KG/M2

## 2021-06-04 VITALS
TEMPERATURE: 98.4 F | OXYGEN SATURATION: 97 % | DIASTOLIC BLOOD PRESSURE: 64 MMHG | HEART RATE: 83 BPM | BODY MASS INDEX: 24.34 KG/M2 | SYSTOLIC BLOOD PRESSURE: 110 MMHG | RESPIRATION RATE: 16 BRPM | WEIGHT: 179.5 LBS

## 2021-06-04 VITALS — BODY MASS INDEX: 23.45 KG/M2 | HEIGHT: 72 IN | WEIGHT: 173.1 LBS

## 2021-06-04 VITALS
SYSTOLIC BLOOD PRESSURE: 152 MMHG | WEIGHT: 183.6 LBS | BODY MASS INDEX: 24.9 KG/M2 | OXYGEN SATURATION: 99 % | HEART RATE: 93 BPM | DIASTOLIC BLOOD PRESSURE: 94 MMHG | TEMPERATURE: 98.4 F

## 2021-06-04 NOTE — TELEPHONE ENCOUNTER
Refills sent to the pharmacy but he is overdue for lab work and probably a visit with me. He could schedule a physical if he wanted otherwise at least do a lab only appointment in the next week or two to check on his thyroid levels.

## 2021-06-04 NOTE — TELEPHONE ENCOUNTER
Called and spoke with the patient. He is aware, busy at the moment to make a apt. Will call back to schedule a med check with lab work or a px with lab work.

## 2021-06-04 NOTE — PROGRESS NOTES
Chief Complaint   Patient presents with     Diverticulitis     Pt c/o flare up, achey in stomach, hurts to get up and down, he thinks it is a diverticulitis flare up.        HPI: Complicated 54-year-old male with a history of Graves' disease, malignant melanoma as well as urinary tract stones presents today with new onset left lower quadrant pain.  He notes that over the past 4 days has had increased pain in his left lower quadrant.  Review of old notes shows that on April 13, 2019 he had a CT scan showing market inflammation in the sigmoid colon.  The patient has had 4 episodes of diverticulitis.  Fortunately has had no fever or chills.    ROS:Bowels are normal with no melena or hematochezia.  No vomiting.  No rashes.  No difficulty breathing.  10 point system review otherwise negative    SH:    reports that he has never smoked. He has never used smokeless tobacco. He reports that he does not drink alcohol or use drugs.      FH: The Patient's family history includes Cancer in his maternal aunt; Heart disease in his mother; Hypertension in his mother; Lung disease in his maternal uncle; Urolithiasis in his maternal uncle.     Meds:  Omi has a current medication list which includes the following prescription(s): calcium polycarbophil, esomeprazole magnesium, fluticasone propionate, ibuprofen, levothyroxine, levothyroxine, ciprofloxacin hcl, and metronidazole.    O:  /64   Pulse 83   Temp 98.4  F (36.9  C)   Resp 16   Wt 179 lb 8 oz (81.4 kg)   SpO2 97%   BMI 24.34 kg/m     Constitutional:    --Vitals as above  --No acute distress  Abdomen--nondistended and soft except for market pain and guarding in the left lower quadrant distended  Skin-Pink and dry           A/P:   1. Diverticulitis of colon  The patient has symptoms consistent with diverticulitis.  This is concerning given his history.  The patient has had 3 other episodes.  Fortunately has not had fever but his abdomen is markedly tender which is  concerning for acute process in the abdomen.  We will dispatch him directly to [facility] scanning.  If CT scan positive would have him go to emergency department for evaluation and admission.  - ciprofloxacin HCl (CIPRO) 500 MG tablet; Take 1 tablet (500 mg total) by mouth 2 (two) times a day for 7 days.  Dispense: 14 tablet; Refill: 0  - metroNIDAZOLE (FLAGYL) 500 MG tablet; Take 1 tablet (500 mg total) by mouth 3 (three) times a day for 14 days.  Dispense: 42 tablet; Refill: 0  - CT Abdomen Pelvis With Oral With Without IV Contrast; Future

## 2021-06-05 ENCOUNTER — RECORDS - HEALTHEAST (OUTPATIENT)
Dept: FAMILY MEDICINE | Facility: CLINIC | Age: 56
End: 2021-06-05

## 2021-06-05 VITALS
HEART RATE: 65 BPM | TEMPERATURE: 98.3 F | OXYGEN SATURATION: 98 % | DIASTOLIC BLOOD PRESSURE: 89 MMHG | SYSTOLIC BLOOD PRESSURE: 155 MMHG | BODY MASS INDEX: 25.08 KG/M2 | WEIGHT: 184.9 LBS

## 2021-06-05 DIAGNOSIS — R47.89 OTHER SPEECH DISTURBANCE: ICD-10-CM

## 2021-06-05 NOTE — PROGRESS NOTES
Omi Grimm is a 54 y.o. year old male with a several year history of diverticulosis and diverticulitis.  He has had multiple episodes of diverticulitis occluding a single hospitalization.  He states the number of episodes he has been treated as an outpatient upwards of 5 separate times.  He continues to have smoldering lower pelvic pain at times but denies any history of constipation.  His last colonoscopy was approximately 2 years ago which he states was normal apart from the ecchymosis.  Currently he feels well with no other complaints.    Allergies:Penicillins and Dilaudid [hydromorphone]    Past Medical History:   Diagnosis Date     Acute bronchitis      Allergic rhinitis      Asthma      Graves disease      Hypertension      Kidney stone 11/06/2017    first stone at age 52     Malignant melanoma of skin of trunk, except scrotum (H)     removed from back 2015     Unspecified hypothyroidism        Past Surgical History:   Procedure Laterality Date     EYE SURGERY      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     HEMANGIOMA EXCISION      as an infant     CT BX/REMV,LYMPH NODE,DEEP AXILL Left 6/5/2014    Procedure:  AXILLARY LYMPH NODE DISSECTION-LEFT;  Surgeon: Sergey Glover MD;  Location: [facility];  Service: General     CT CYSTO/URETERO W/LITHOTRIPSY &INDWELL STENT INSRT Left 11/14/2017    Procedure: CYSTOSCOPY, WITH LEFT URETEROSCOPY,  LASER LITHOTRIPSY STONE EXTRACTION STENT INSERTION;  Surgeon: González Lagos MD;  Location: [facility];  Service: Urology     CT EXCIS SUPRATENT MENINGIOMA      Description: Craniotomy Supratentorial Excision Of Meningioma;  Recorded: 02/06/2013;  Comments: Excision of frontal facial meningioma at age 6 months (residual ptosis right eye)     SINUS SURGERY      polyposis     SINUS SURGERY  April 2011 - (x2)    polyp removal x2     SKIN BIOPSY       THYROIDECTOMY, PARTIAL N/A 10/9/2018    Procedure: PARATHYROIDECTOMY 23HR;  Surgeon: Vincent iLnn MD;   Location: [facility];  Service:      VASECTOMY  12/2011     WRIST SURGERY  2003     wrist surgery, right         CURRENT MEDS:    Current Outpatient Medications:      calcium polycarbophil (FIBERCON) 625 mg tablet, Take 625 mg by mouth daily., Disp: , Rfl:      esomeprazole magnesium (NEXIUM 24 HR) 22.3 mg capsule, Take 22.3 mg by mouth daily as needed., Disp: , Rfl:      fluticasone propionate (FLONASE ALLERGY RELIEF) 50 mcg/actuation nasal spray, 1 spray into each nostril daily., Disp: 16 g, Rfl: 0     ibuprofen (ADVIL,MOTRIN) 200 MG tablet, Take 200 mg by mouth every 6 (six) hours as needed for pain., Disp: , Rfl:      levothyroxine (SYNTHROID, LEVOTHROID) 175 MCG tablet, Take 1 tab on tues, thurs, Saturday and Sunday., Disp: 48 tablet, Rfl: 0     levothyroxine (SYNTHROID, LEVOTHROID) 200 MCG tablet, Take 1 tab on Monday, Wednesday and Friday., Disp: 40 tablet, Rfl: 0    Family History   Problem Relation Age of Onset     Hypertension Mother      Heart disease Mother         pacemaker     Cancer Maternal Aunt         lung     Urolithiasis Maternal Uncle      Lung disease Maternal Uncle         asthma        reports that he has never smoked. He has never used smokeless tobacco. He reports that he does not drink alcohol or use drugs.    Review of Systems:  The 12 system review is within normal limits except for as mentioned above.  General ROS: No complaints or constitutional symptoms  Ophthalmic ROS: No complaints of visual changes  Skin: No complaints or symptoms   Endocrine: No complaints or symptoms  Hematologic/Lymphatic: No symptoms or complaints  Psychiatric: No symptoms or complaints  Respiratory ROS: no cough, shortness of breath, or wheezing  Cardiovascular ROS: no chest pain or dyspnea on exertion  Gastrointestinal ROS: As per HPI  Genito-Urinary ROS: no dysuria, trouble voiding, or hematuria  Musculoskeletal ROS: no joint or muscle pain  Neurological ROS: no TIA or stroke  symptoms      EXAM:  Pulse 79   Wt 182 lb (82.6 kg)   SpO2 98%   BMI 24.68 kg/m    GENERAL: Well developed male, No acute distress, pleasant and conversant   EYES: Pupils equal, round and reactive, no scleral icterus  CARDIAC: Regular rate and rhythm, no obvious murmurs noted  CHEST/LUNG: Clear to ascultation bilaterally, No ronchi, No wheezes  ABDOMEN: Soft, mild discomfort to deep palpation in the suprapubic and left lower quadrant  SKIN: Pink, warm and dry, no obvious rashes or lesions   NEURO:No focal deficits, ambulatory  MUSCULOSKELETAL:No obvious deformities, no swelling, normal appearing      LABS:  Lab Results   Component Value Date    WBC 5.2 08/12/2019    HGB 14.6 08/12/2019    HCT 43.3 08/12/2019    MCV 85 08/12/2019     08/12/2019     INR/Prothrombin Time      Lab Results   Component Value Date    ALT 14 08/12/2019    AST 16 08/12/2019    ALKPHOS 63 08/12/2019    BILITOT 0.7 08/12/2019       IMAGES:   Relevant images were reviewed and discussed with the patient.  Notable findings were   EXAM: CT ABDOMEN PELVIS W ORAL W WO IV CONTRAST  LOCATION: [facility]  DATE/TIME: 12/26/2019 6:14 PM     INDICATION: Left lower quadrant abdominal pain, prior diverticulitis. Malignant melanoma.  COMPARISON: 08/08/2019.  TECHNIQUE: CT scan of the abdomen and pelvis was performed before and after injection of IV contrast. Multiplanar reformats were obtained. Dose reduction techniques were used.  CONTRAST: Iohexol (Omni) 100 mL.      FINDINGS:   LOWER CHEST: Tiny hiatal hernia.     HEPATOBILIARY: Cholelithiasis without surrounding inflammatory change. Liver normal.     PANCREAS: Normal.     SPLEEN: Normal.     ADRENAL GLANDS: Normal.     KIDNEYS/BLADDER: Normal.     BOWEL: Extensive diverticulosis with focal colonic wall thickening and inflammatory change involving a portion of sigmoid colon. No abscess, free air or bowel obstruction. Normal appendix.     LYMPH NODES: Normal.     VASCULATURE:  Unremarkable.     PELVIC ORGANS: Normal.     OTHER: None.     MUSCULOSKELETAL: Degenerative disease. Potential nerve root impingement. Presumed bone islands.     IMPRESSION:   1.  Evidence for acute sigmoid diverticulitis. GI consult recommended to consider colonoscopy to exclude other underlying pathology if not performed per routine screening guidelines.  2.  Cholelithiasis.  3.  Tiny hiatal hernia.       Assessment/Plan:   Omi Grimm is a 54 y.o. male with diverticulosis.  I have explained the pathophysiology of diverticulosis and diverticulitis in detail as well as the surgical versus non-operative management strategies.  Because of his significant symptoms and relevant history, the possible sequelae of chronic diverticulosis and diverticulitis including hemorrhage, abscess formation and death were explained.     The risks of surgery to remove a portion of his colon were discussed in detail which include, but are not limited to, bleeding, infection, injury to surrounding structures such at the ureters or intestines,  the need to convert to an open procedure, blood clots, stroke, heart attack, post operative pain, the need for an ostomy and death.  Additionally, the risks of non operative management were discussed which include, but are not limited to, bleeding,  worsening infection, increased pain, sepsis and death.     He understands everything which was discussed and has consented to proceed with a robotic sigmoidectomy.  We will schedule surgery accordingly.       Jaziel Rodriguez D.O. FACS  [phone]  [facility] Department of Surgery

## 2021-06-06 NOTE — ANESTHESIA POSTPROCEDURE EVALUATION
Patient: Omi Grimm  Procedure(s):  COLECTOMY, SIGMOID, ROBOT-ASSISTED, LAPAROSCOPIC, USING DA CHRIST XI  Anesthesia type: general    Patient location: PACU  Last vitals:   Vitals Value Taken Time   /70 2/14/2020 12:40 PM   Temp 36.4  C (97.5  F) 2/14/2020 12:30 PM   Pulse 79 2/14/2020 12:41 PM   Resp 15 2/14/2020 12:32 PM   SpO2 98 % 2/14/2020 12:41 PM   Vitals shown include unvalidated device data.  Post vital signs: stable  Level of consciousness: alert and conversant  Post-anesthesia pain: pain controlled  Post-anesthesia nausea and vomiting: no  Pulmonary: unassisted, return to baseline  Cardiovascular: stable and blood pressure at baseline  Hydration: adequate  Anesthetic events: no    QCDR Measures:  ASA# 11 - Kacie-op Cardiac Arrest: ASA11B - Patient did NOT experience unanticipated cardiac arrest  ASA# 12 - Kacie-op Mortality Rate: ASA12B - Patient did NOT die  ASA# 13 - PACU Re-Intubation Rate: ASA13B - Patient did NOT require a new airway mgmt  ASA# 10 - Composite Anes Safety: ASA10A - No serious adverse event    Additional Notes:

## 2021-06-06 NOTE — PROGRESS NOTES
Omi,  Your test results are all back and I like to review those for you.    The TSH is improved from 14 a year ago to 5.78 now and would like to see that at 5 or less.  The free T4 is normal.  My suggestion would be to take the levothyroxine 200 mcg 4 days out of each week and the 175 mcg dose 3 days out of each week.  I believe now that you are just simply alternating every other day.  That is a small change which should be sufficient to get the TSH down to 5 or less.    The metabolic panel is completely normal with normal electrolytes, negative's screening for diabetes, normal kidney function and liver function.    The CBC is normal with no signs of anemia  and normal white blood cell count.    The EKG is satisfactory.    I think you are ready to proceed with surgery this week and I hope that goes well.    Best regards,  Dr. Pascal

## 2021-06-06 NOTE — PROGRESS NOTES
Preoperative Exam    Scheduled Procedure: Robotic Sigmoid Colectomy  Surgery Date:  02/14/2020  Surgery Location: [facility], fax [phone]    Surgeon:  Dr. Rodriguez     Assessment/Plan:     1. Encounter for preoperative examination for general surgical procedure     - HM1(CBC and Differential)  - Comprehensive Metabolic Panel  - Electrocardiogram Perform and Read       2. History of diverticulitis  The patient has had about 5 episodes of diverticulitis in the left sigmoid area always involving the same segment and the last acute flare was about 2 months ago.  He is currently without symptoms.      3. Postablative hypothyroidism  The patient is on alternating days 200 mcg and 175 mcg of levothyroxine.  His last TSH was quite high at 13.  He is due to have that rechecked.  He has hypothyroidism because of Graves' disease status post radioactive iodine treatment.    - Thyroid Stimulating Hormone (TSH), 5+  - T4, Free, 1.4  We will increase the thyroid replacement to 200 mcg 5 days out of the week and 175 mcg 2 days out of the week.    4. Primary hyperparathyroidism (H)  The patient had a parathyroid gland removal in the past.      5. Malignant melanoma of skin of trunk, except scrotum (H)  He had melanoma on his back requiring a wide excision and a flap revision.  He is not had any recurrence of the melanoma.          Surgical Procedure Risk: Intermediate (reported cardiac risk generally 1-5%)  Have you had prior anesthesia?: Yes  Have you or any family members had a previous anesthesia reaction:  No  Do you or any family members have a history of a clotting or bleeding disorder?: No  Cardiac Risk Assessment: no increased risk for major cardiac complications    APPROVAL GIVEN to proceed with proposed procedure, with further diagnostic evaluation  Completed and patient OK to proceed with needed anesthesia and surgery.    Please Note:  No use of CPAP or history of obstructive sleep apnea    Functional Status:  Independent  Patient plans to recover at home with family.     Subjective:      Omi Grimm is a 54 y.o. male who presents for a preoperative consultation.   Currently has no acute illness    All other systems reviewed and are negative, other than those listed in the HPI.    Pertinent History  Do you have difficulty breathing or chest pain after walking up a flight of stairs: No  History of obstructive sleep apnea: No  Steroid use in the last 6 months: No  Frequent Aspirin/NSAID use: No  Prior Blood Transfusion: No  Prior Blood Transfusion Reaction: No  If for some reason prior to, during or after the procedure, if it is medically indicated, would you be willing to have a blood transfusion?:  There is no transfusion refusal.    Current Outpatient Medications   Medication Sig Dispense Refill     calcium polycarbophil (FIBERCON) 625 mg tablet Take 625 mg by mouth daily.       esomeprazole magnesium (NEXIUM 24 HR) 22.3 mg capsule Take 22.3 mg by mouth daily as needed.       fluticasone propionate (FLONASE ALLERGY RELIEF) 50 mcg/actuation nasal spray 1 spray into each nostril daily. 16 g 0     levothyroxine (SYNTHROID, LEVOTHROID) 175 MCG tablet Take 1 tab on tues, thurs, Saturday and Sunday. 48 tablet 0     levothyroxine (SYNTHROID, LEVOTHROID) 200 MCG tablet Take 1 tab on Monday, Wednesday and Friday. 40 tablet 0     ibuprofen (ADVIL,MOTRIN) 200 MG tablet Take 200 mg by mouth every 6 (six) hours as needed for pain.       metroNIDAZOLE (FLAGYL) 500 MG tablet Take 2 tabs po at 1 pm, 2pm, and 11pm the day before your surgery. 6 tablet 0     neomycin (MYCIFRADIN) 500 mg tablet Take 2 tabs po at 1 pm, 2pm, and 11pm the day before your surgery. 6 tablet 0     No current facility-administered medications for this visit.         Allergies   Allergen Reactions     Penicillins Shortness Of Breath and Swelling     Dilaudid [Hydromorphone] Dizziness       Patient Active Problem List   Diagnosis     Multiple Environmental  Allergies     Mild intermittent asthma     Pain In The Hands     Hypothyroidism     Graves' Disease     Allergic Rhinitis     Lump In / On The Skin     Malignant Melanoma Of The Back     Dysphagia     History of diverticulitis     Hypercalcemia     Asymptomatic gallstones     Urinary tract stones     Hyperparathyroidism (H)     Diverticulosis       Past Medical History:   Diagnosis Date     Acute bronchitis      Allergic rhinitis      Asthma      Graves disease      Hypertension      Kidney stone 11/06/2017    first stone at age 52     Malignant melanoma of skin of trunk, except scrotum (H)     removed from back 2015     Unspecified hypothyroidism        Past Surgical History:   Procedure Laterality Date     EYE SURGERY      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     HEMANGIOMA EXCISION      as an infant     FL BX/REMV,LYMPH NODE,DEEP AXILL Left 6/5/2014    Procedure:  AXILLARY LYMPH NODE DISSECTION-LEFT;  Surgeon: Sergey Glover MD;  Location: [facility];  Service: General     FL CYSTO/URETERO W/LITHOTRIPSY &INDWELL STENT INSRT Left 11/14/2017    Procedure: CYSTOSCOPY, WITH LEFT URETEROSCOPY,  LASER LITHOTRIPSY STONE EXTRACTION STENT INSERTION;  Surgeon: González Lagos MD;  Location: [facility];  Service: Urology     FL EXCIS SUPRATENT MENINGIOMA      Description: Craniotomy Supratentorial Excision Of Meningioma;  Recorded: 02/06/2013;  Comments: Excision of frontal facial meningioma at age 6 months (residual ptosis right eye)     SINUS SURGERY      polyposis     SINUS SURGERY  April 2011 - (x2)    polyp removal x2     SKIN BIOPSY       THYROIDECTOMY, PARTIAL N/A 10/9/2018    Procedure: PARATHYROIDECTOMY 23HR;  Surgeon: Vincent Linn MD;  Location: [facility];  Service:      VASECTOMY  12/2011     WRIST SURGERY  2003     wrist surgery, right         Social History     Socioeconomic History     Marital status:      Spouse name: Not on file     Number of children: Not on file      "Years of education: Not on file     Highest education level: Not on file   Occupational History     Occupation:    Social Needs     Financial resource strain: Not on file     Food insecurity:     Worry: Not on file     Inability: Not on file     Transportation needs:     Medical: Not on file     Non-medical: Not on file   Tobacco Use     Smoking status: Never Smoker     Smokeless tobacco: Never Used   Substance and Sexual Activity     Alcohol use: No     Drug use: No     Sexual activity: Yes     Partners: Female     Birth control/protection: Surgical   Lifestyle     Physical activity:     Days per week: Not on file     Minutes per session: Not on file     Stress: Not on file   Relationships     Social connections:     Talks on phone: Not on file     Gets together: Not on file     Attends Sikhism service: Not on file     Active member of club or organization: Not on file     Attends meetings of clubs or organizations: Not on file     Relationship status: Not on file     Intimate partner violence:     Fear of current or ex partner: Not on file     Emotionally abused: Not on file     Physically abused: Not on file     Forced sexual activity: Not on file   Other Topics Concern     Not on file   Social History Narrative    Woks at restaurant    No exercise    Caffeine: rare       Patient Care Team:  Danuta Galicia MD as PCP - General (Family Medicine)  Kane Mann MD as Physician (Hematology and Oncology)  Lombardi, Susan L, RN as RN, Care Manager  Danuta Galicia MD as Assigned PCP   Saturnino Pascal MD preop evaluation                                                            Objective:     Vitals:    02/10/20 1354   BP: 130/88   Pulse: 76   SpO2: 100%   Weight: 184 lb 1.6 oz (83.5 kg)   Height: 5' 11.5\" (1.816 m)         Physical Exam:  Physical Exam  Vitals signs and nursing note reviewed.   Constitutional:       Appearance: He is normal weight.   HENT:      Head: " Normocephalic.      Right Ear: Tympanic membrane and ear canal normal.      Left Ear: Tympanic membrane and ear canal normal.      Nose: Nose normal. No congestion.      Mouth/Throat:      Mouth: Mucous membranes are moist.      Pharynx: No posterior oropharyngeal erythema.   Eyes:      Comments: No drooping eyelid on the right side   Neck:      Musculoskeletal: Normal range of motion.      Vascular: No carotid bruit.   Cardiovascular:      Rate and Rhythm: Normal rate and regular rhythm.      Pulses: Normal pulses.      Heart sounds: Normal heart sounds. No murmur.   Pulmonary:      Effort: Pulmonary effort is normal. No respiratory distress.      Breath sounds: No stridor. No wheezing or rales.   Lymphadenopathy:      Cervical: No cervical adenopathy.   Neurological:      Mental Status: He is alert.                             Patient Instructions      Before Your Surgery       Call your surgeon if there is any change in your health. This includes signs of a cold or flu (such as a sore throat, runny nose, cough, rash or fever).       Do not smoke, drink alcohol or take over the counter medicine (unless your surgeon or primary care doctor tells you to) for the 24 hours before and after surgery.       If you take prescribed drugs: Follow your doctor s orders about which medicines to take and which to stop until after surgery.      Eating and drinking prior to surgery: follow the instructions from your surgeon.      Take a shower or bath the night before surgery. Use the soap your surgeon gave you to gently clean your skin. If you do not have soap from your surgeon, use your regular soap. Do not shave or scrub the surgery site. Wear clean pajamas and have clean sheets on your bed.             Hold all supplements, aspirin and NSAIDs for 7 days prior to surgery.    Follow your surgeon's direction on when to stop eating and drinking prior to surgery.    Your surgeon will be managing your pain after your surgery.       Remove all jewelry and metal piercings before your surgery.       EKG: Normal sinus rhythm rate 75    Labs:  Recent Results (from the past 24 hour(s))   Electrocardiogram Perform and Read    Collection Time: 02/10/20  2:34 PM   Result Value Ref Range    SYSTOLIC BLOOD PRESSURE      DIASTOLIC BLOOD PRESSURE      VENTRICULAR RATE 75 BPM    ATRIAL RATE 75 BPM    P-R INTERVAL 164 ms    QRS DURATION 86 ms    Q-T INTERVAL 404 ms    QTC CALCULATION (BEZET) 451 ms    P Axis 70 degrees    R AXIS 51 degrees    T AXIS 48 degrees    MUSE DIAGNOSIS       Normal sinus rhythm  Nonspecific T wave abnormality  Abnormal ECG  No previous ECGs available     Thyroid Stimulating Hormone (TSH)    Collection Time: 02/10/20  2:47 PM   Result Value Ref Range    TSH 5.78 (H) 0.30 - 5.00 uIU/mL   Comprehensive Metabolic Panel    Collection Time: 02/10/20  2:47 PM   Result Value Ref Range    Sodium 141 136 - 145 mmol/L    Potassium 4.6 3.5 - 5.0 mmol/L    Chloride 103 98 - 107 mmol/L    CO2 29 22 - 31 mmol/L    Anion Gap, Calculation 9 5 - 18 mmol/L    Glucose 81 70 - 125 mg/dL    BUN 15 8 - 22 mg/dL    Creatinine 0.95 0.70 - 1.30 mg/dL    GFR MDRD Af Amer >60 >60 mL/min/1.73m2    GFR MDRD Non Af Amer >60 >60 mL/min/1.73m2    Bilirubin, Total 0.5 0.0 - 1.0 mg/dL    Calcium 10.1 8.5 - 10.5 mg/dL    Protein, Total 7.7 6.0 - 8.0 g/dL    Albumin 4.0 3.5 - 5.0 g/dL    Alkaline Phosphatase 65 45 - 120 U/L    AST 20 0 - 40 U/L    ALT 20 0 - 45 U/L   T4, Free    Collection Time: 02/10/20  2:47 PM   Result Value Ref Range    Free T4 1.4 0.7 - 1.8 ng/dL   HM1 (CBC with Diff)    Collection Time: 02/10/20  2:47 PM   Result Value Ref Range    WBC 6.4 4.0 - 11.0 thou/uL    RBC 5.17 4.40 - 6.20 mill/uL    Hemoglobin 15.3 14.0 - 18.0 g/dL    Hematocrit 44.9 40.0 - 54.0 %    MCV 87 80 - 100 fL    MCH 29.6 27.0 - 34.0 pg    MCHC 34.0 32.0 - 36.0 g/dL    RDW 12.6 11.0 - 14.5 %    Platelets 278 140 - 440 thou/uL    MPV 6.9 (L) 7.0 - 10.0 fL    Neutrophils % 58  50 - 70 %    Lymphocytes % 31 20 - 40 %    Monocytes % 7 2 - 10 %    Eosinophils % 4 0 - 6 %    Basophils % 1 0 - 2 %    Neutrophils Absolute 3.7 2.0 - 7.7 thou/uL    Lymphocytes Absolute 2.0 0.8 - 4.4 thou/uL    Monocytes Absolute 0.4 0.0 - 0.9 thou/uL    Eosinophils Absolute 0.3 0.0 - 0.4 thou/uL    Basophils Absolute 0.0 0.0 - 0.2 thou/uL       Immunization History   Administered Date(s) Administered     Influenza [id number], 01/11/2010     Influenza, seasonal,quad inj 6-35 mos 10/20/2014     Influenza,seasonal quad, PF, =/> 6months 12/24/2015     Influenza,seasonal,quad inj =/> 6months 10/06/2017, 10/05/2018     Tdap 04/28/2008, 10/05/2018           Electronically signed by Saturnino Pascal MD 02/11/20 1:56 PM

## 2021-06-06 NOTE — ANESTHESIA PROCEDURE NOTES
Peripheral Block    Patient location during procedure: OR  Start time: 2/14/2020 11:18 AM  End time: 2/14/2020 11:22 AM  post-op analgesia per surgeon order as noted in medical record  Staffing:  Performing  Anesthesiologist: Heber Krause MD  Preanesthetic Checklist  Completed: patient identified, site marked, risks, benefits, and alternatives discussed, timeout performed, consent obtained, airway assessed, oxygen available, suction available, emergency drugs available and hand hygiene performed  Peripheral Block  Block type: other, TAP  Prep: ChloraPrep  Patient position: supine  Patient monitoring: cardiac monitor, continuous pulse oximetry, blood pressure and heart rate  Laterality: bilateral, same technique used bilaterally  Injection technique: ultrasound guided    Ultrasound used to visualize needle placement in proximity to nerve being blocked: yes   US used to visualize anesthetic spread  Visualized anatomic structures normal  No Pathological Findings  Permanent ultrasound image captured for medical record  Sterile gel and probe cover used for ultrasound.  Needle  Needle type: echogenic   Needle gauge: 20G  Needle length: 4 in  no peripheral nerve catheter placed  Assessment  Injection assessment: no difficulty with injection, negative aspiration for heme, no paresthesia on injection and incremental injection

## 2021-06-06 NOTE — PROGRESS NOTES
HPI: Omi Grimm is here for follow up after a robotic sigmoidectomy.  He is doing well with minimal discomfort.  Having normal bowel movements tolerating a regular diet.    Allergies, Medications, Social History, Past Medical History and Past Surgical History were reviewed and are noted in the chart.    There were no vitals taken for this visit.  There is no height or weight on file to calculate BMI.      EXAM:   GENERAL: Appears well  Abdomen-soft, nontender well-healed incisions RIANNA drain with scant serous output    Closed/Suction Drain Left Back Other (Comment) (Active)       Drain Anterior Abdomen 15 Fr. (Active)   Site Description Healing 2/18/2020  7:49 AM   Dressing Status  Clean;Dry;Intact 2/18/2020  7:49 AM   Drainage Appearance Serosanguineous 2/18/2020  7:49 AM   Status To bulb suction 2/18/2020  7:49 AM   Output (mL) 0 mL 2/17/2020 10:35 PM       Drain Anterior Abdomen  (Active)   Site Description Healing 2/18/2020  7:49 AM   Dressing Status  Clean;Dry;Intact 2/18/2020  7:49 AM   Drainage Appearance Serosanguineous 2/18/2020  7:49 AM   Status Open to gravity drainage 2/18/2020  7:49 AM       Incision 06/05/14 Back Left (Active)       Incision 11/14/17 Perineum Left (Active)       Incision 10/09/18 Throat Bilateral (Active)   Site Assessment Clean;Dry 10/9/2018  4:40 PM   Surrounding Tissue Assessment Clean;Dry;Intact 10/9/2018  4:40 PM   Closure Steristrips 10/9/2018  4:40 PM   Dressing Dry gauze 10/9/2018  4:40 PM   Dressing Status  Clean;Dry;Intact 10/9/2018  4:40 PM       Incision 02/14/20 Surgical Abdomen (Active)   Site Assessment Clean;Dry;Approximated 2/18/2020  7:49 AM   Surrounding Tissue Assessment Clean;Dry;Intact 2/18/2020  7:49 AM   Closure Steristrips 2/18/2020  7:49 AM   Drainage Amount Small 2/15/2020  4:00 PM   Drainage Description Sanguineous (Bloody) 2/15/2020  4:00 PM   Site Care Other (Comment) 2/16/2020  1:55 PM   Dressing Open to air 2/18/2020  7:49 AM   Dressing Status   Intact 2/18/2020  7:49 AM       Assessment/Plan: 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 His wound is healing and overall progressing well.  At this point we have removed his vessel loop from his right lower quadrant incision as well as the RIANNA drain.  He will continue with a low residue low fiber diet for the next 4 weeks.  He will follow-up with me on a as needed basis.    Jefferson Rodriguez DO [facility] Department of Surgery

## 2021-06-06 NOTE — ANESTHESIA CARE TRANSFER NOTE
Last vitals:   Vitals:    02/14/20 1140   BP: 136/73   Pulse: 79   Resp: 20   Temp: 97.3   SpO2: 100%     Patient's level of consciousness is drowsy  Spontaneous respirations: yes  Maintains airway independently: yes  Dentition unchanged: yes  Oropharynx: oropharynx clear of all foreign objects    QCDR Measures:  ASA# 20 - Surgical Safety Checklist: WHO surgical safety checklist completed prior to induction    PQRS# 430 - Adult PONV Prevention: 4558F - Pt received => 2 anti-emetic agents (different classes) preop & intraop  ASA# 8 - Peds PONV Prevention: NA - Not pediatric patient, not GA or 2 or more risk factors NOT present  PQRS# 424 - Kacie-op Temp Management: 4559F - At least one body temp DOCUMENTED => 35.5C or 95.9F within required timeframe  PQRS# 426 - PACU Transfer Protocol: - Transfer of care checklist used  ASA# 14 - Acute Post-op Pain: ASA14B - Patient did NOT experience pain >= 7 out of 10

## 2021-06-06 NOTE — ANESTHESIA PREPROCEDURE EVALUATION
Anesthesia Evaluation      Patient summary reviewed   History of anesthetic complications     Airway   Mallampati: I  Neck ROM: full   Pulmonary - normal exam   (+) asthma  mild,  well controlled,                          Cardiovascular - normal exam  Exercise tolerance: > or = 4 METS  (+) hypertension, ,      Neuro/Psych - negative ROS     Endo/Other    (+) hypothyroidism,   (-) hyperthyroidism     GI/Hepatic/Renal    (-) renal disease     Other findings: Had nausea after sinus surgery, ok other times, has motion sickness per spouse. H/o dysphagia, diverticulitis, nephrolithiasis, malignant melanoma, hyperparathyroidism.  Hg 15.3, K 4.6, GFR>60, TSH slightly elevated.        Dental - normal exam                        Anesthesia Plan  Planned anesthetic: general endotracheal  Consider background propofol infusion, ketamine after induction, Mg++, preop duoneb, scop patch.  Pt gets dizzy from dilaudid, discussed with him and will use only if necessary.  Consented for TAP blocks with exparel, and surgeon just requested same, at end of case.  ASA 2   Induction: intravenous   Anesthetic plan and risks discussed with: patient  Anesthesia plan special considerations: antiemetics,   Post-op plan: routine recovery

## 2021-06-06 NOTE — PATIENT INSTRUCTIONS - HE
Before Your Surgery       Call your surgeon if there is any change in your health. This includes signs of a cold or flu (such as a sore throat, runny nose, cough, rash or fever).       Do not smoke, drink alcohol or take over the counter medicine (unless your surgeon or primary care doctor tells you to) for the 24 hours before and after surgery.       If you take prescribed drugs: Follow your doctor s orders about which medicines to take and which to stop until after surgery.      Eating and drinking prior to surgery: follow the instructions from your surgeon.      Take a shower or bath the night before surgery. Use the soap your surgeon gave you to gently clean your skin. If you do not have soap from your surgeon, use your regular soap. Do not shave or scrub the surgery site. Wear clean pajamas and have clean sheets on your bed.             Hold all supplements, aspirin and NSAIDs for 7 days prior to surgery.    Follow your surgeon's direction on when to stop eating and drinking prior to surgery.    Your surgeon will be managing your pain after your surgery.      Remove all jewelry and metal piercings before your surgery.

## 2021-06-07 NOTE — TELEPHONE ENCOUNTER
Last Med Check: 2/10/2020  ?    Next med check due on: ?    CSA on File: N/A    Future Appointment Scheduled ? No    Last Med Refill? 12/23/2019    Jessica Ambriz MA

## 2021-06-08 NOTE — PROGRESS NOTES
Assessment: Diverticulitis.    Plan: I extended his antibiotic course one more week, giving him Cipro 500 mg twice daily for 7 days as well as metronidazole 500 mg 3 times daily for 7 more days.  He should continue with good hydration and a bland diet and follow-up with Dr. Galicia next week if he is still not completely resolving this and is still having pain.  He coincidentally is getting a CT scan for a follow-up of his melanoma next week which may be helpful in seeing how his diverticulitis is coming along.    Subjective: 51-year-old male who is here today for follow-up of diverticulitis.  He saw the walk-in care on January 2 and they sent him to the hospital to get a CT scan which showed acute sigmoid diverticulitis, and he was given Cipro and Flagyl for 10 days.  He has gotten better but has not completely resolved it and is concerned that it might come back and get worse.  He still has the pain across the lower abdomen no fevers and no diarrhea at this point.    Objective well-appearing male in no acute distress.  Vital signs as noted.  Chest clear to auscultation.  Heart regular rate and rhythm.  Abdomen is slightly tender in the lower quadrants but no guarding rebound is present.

## 2021-06-08 NOTE — PROGRESS NOTES
ASSESSMENT:  1. Abdominal pain  HM1(CBC and Differential)    Urinalysis    Comprehensive Metabolic Panel    Lipase    HM1 (CBC with Diff)    CT Abdomen Pelvis With Oral With IV Contrast   2. Diverticulitis of large intestine with abscess without bleeding         Results for orders placed or performed in visit on 01/02/17   Urinalysis   Result Value Ref Range    Color, UA Orange (!) Colorless, Yellow, Straw, Light Yellow    Clarity, UA Clear Clear    Glucose, UA Negative Negative    Bilirubin, UA Small (!) Negative    Ketones, UA 15 mg/dL (!) Negative    Specific Gravity, UA >=1.030 1.002 - 1.030    Blood, UA Negative Negative    pH, UA 5.5 4.5 - 8.0    Protein, UA 30 mg/dL (!) Negative mg/dL    Urobilinogen, UA 0.2 E.U./dL 0.2 E.U./dL, 1.0 E.U./dL    Nitrite, UA Negative Negative    Leukocytes, UA Negative Negative    Bacteria, UA Few (!) None Seen hpf    RBC, UA None Seen None Seen, 0-2 hpf    WBC, UA 0-5 None Seen, 0-5 hpf    Squam Epithel, UA 0-5 None Seen, 0-5 lpf    Mucus, UA Moderate (!) None Seen lpf   Comprehensive Metabolic Panel   Result Value Ref Range    Sodium 139 136 - 145 mmol/L    Potassium 4.4 3.5 - 5.0 mmol/L    Chloride 104 98 - 107 mmol/L    CO2 26 22 - 31 mmol/L    Anion Gap, Calculation 9 5 - 18 mmol/L    Glucose 96 70 - 125 mg/dL    BUN 18 8 - 22 mg/dL    Creatinine 1.07 0.70 - 1.30 mg/dL    GFR MDRD Af Amer >60 >60 mL/min/1.73m2    GFR MDRD Non Af Amer >60 >60 mL/min/1.73m2    Bilirubin, Total 1.2 (H) 0.0 - 1.0 mg/dL    Calcium 10.8 (H) 8.5 - 10.5 mg/dL    Protein, Total 7.8 6.0 - 8.0 g/dL    Albumin 4.0 3.5 - 5.0 g/dL    Alkaline Phosphatase 87 45 - 120 U/L    AST 20 0 - 40 U/L    ALT 21 0 - 45 U/L   Lipase   Result Value Ref Range    Lipase 21 0 - 52 U/L   HM1 (CBC with Diff)   Result Value Ref Range    WBC 12.2 (H) 4.0 - 11.0 thou/uL    RBC 5.11 4.40 - 6.20 mill/uL    Hemoglobin 15.0 14.0 - 18.0 g/dL    Hematocrit 44.2 40.0 - 54.0 %    MCV 86 80 - 100 fL    MCH 29.3 27.0 - 34.0 pg    MCHC  33.9 32.0 - 36.0 g/dL    RDW 12.4 11.0 - 14.5 %    Platelets 220 140 - 440 thou/uL    MPV 6.8 (L) 7.0 - 10.0 fL    Neutrophils % 81 (H) 50 - 70 %    Lymphocytes % 10 (L) 20 - 40 %    Monocytes % 6 2 - 10 %    Eosinophils % 2 0 - 6 %    Basophils % 1 0 - 2 %    Neutrophils Absolute 9.8 (H) 2.0 - 7.7 thou/uL    Lymphocytes Absolute 1.3 0.8 - 4.4 thou/uL    Monocytes Absolute 0.8 0.0 - 0.9 thou/uL    Eosinophils Absolute 0.3 0.0 - 0.4 thou/uL    Basophils Absolute 0.1 0.0 - 0.2 thou/uL     Given mild leukocytosis with left shift and abd pain across the entire lower abd, will check CT of abd pelvis. Differential: diverticulitis, possible abscess, appendicitis.     PLAN:  Diverticulitis without abscess  Cipro and Flagyl prescribed  Push fluids  Clear liquid diet, advance to bland diet as tolerated in 1 -2 days  Take Tylenol or Ibuprofen as needed for pain  Follow up with primary care for any new or  worsening symptoms    SUBJECTIVE:  Omi Bedollagisela 51 y.o. male  who presents with mom for complains of abdominal pain.  This started yesterday with diffuse stomach cramps and now is in lower abd bilaterally.     Radiates to: none  Severity: 2/10 currently, at its worse its 5/10 cramping pain. He has taken Advil 400mg this morning with relief.     He had fever, tmax 101 yesterday. Today it was 99.2.   Denies n/v, decreased appetite. His last BM was today and was smaller than normal but denies hematochezia or melena. No diarrhea, constipation. No urinary symptoms. No flank pain.     Hx of diverticulitis, last exacerbation was this past spring. This feels quite similar ro previous episode but this time bilateral.    Additional Review of Systems: pertinent as above    Past Medical History   Diagnosis Date     Acute bronchitis      Allergic rhinitis      Asthma      Graves disease      Hypertension      Malignant melanoma of skin of trunk, except scrotum      Unspecified hypothyroidism        Past Surgical History   Procedure  Laterality Date     Pr excis supratent meningioma       Description: Craniotomy Supratentorial Excision Of Meningioma;  Recorded: 02/06/2013;  Comments: Excision of frontal facial meningioma at age 6 months (residual ptosis right eye)     Eye surgery       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     Sinus surgery       polyposis     Wrist surgery, right       Pr bx/remv,lymph node,deep axill Left 6/5/2014     Procedure:  AXILLARY LYMPH NODE DISSECTION-LEFT;  Surgeon: Sergey Glover MD;  Location: [facility];  Service: General     Hemangioma excision       as an infant     Wrist surgery  2003     Sinus surgery  April 2011 - (x2)     polyp removal     Vasectomy  12/2011     Skin biopsy         Current Outpatient Prescriptions   Medication Sig Dispense Refill     albuterol (PROVENTIL HFA;VENTOLIN HFA) 90 mcg/actuation inhaler Inhale 2 puffs every 4 (four) hours as needed for wheezing or shortness of breath.       calcium polycarbophil (FIBERCON) 625 mg tablet Take 625 mg by mouth daily.       esomeprazole magnesium (NEXIUM 24 HR) 22.3 mg capsule Take 22.3 mg by mouth daily as needed.       fluticasone (FLOVENT HFA) 110 mcg/actuation inhaler Inhale 2 puffs 2 (two) times a day as needed (during winter months).       ibuprofen (ADVIL,MOTRIN) 200 MG tablet Take 400 mg by mouth every 6 (six) hours as needed for mild pain (1-3).        Lactobacillus rhamnosus GG (CULTURELLE) 10-15 Billion cell capsule Take 1 capsule by mouth daily.       levothyroxine (SYNTHROID, LEVOTHROID) 200 MCG tablet Take 1 tablet (200 mcg total) by mouth every morning before breakfast. 90 tablet 2     No current facility-administered medications for this visit.        Allergies as of 01/02/2017 - Minh as Reviewed 01/02/2017   Allergen Reaction Noted     Penicillins Shortness Of Breath and Swelling 10/02/2007     Dilaudid [hydromorphone] Dizziness 02/26/2016         Family History   Problem Relation Age of Onset     Hypertension Mother         Social History   Substance Use Topics     Smoking status: Never Smoker     Smokeless tobacco: Never Used     Alcohol use No          OBJECTIVE:     Visit Vitals     /72     Pulse 83     Temp 98.6  F (37  C) (Oral)     Resp 16     Wt 181 lb 6.4 oz (82.3 kg)     SpO2 100%     BMI 24.6 kg/m2     General appearance: alert, appears stated age and cooperative  Lungs: clear to auscultation bilaterally  Heart: regular rate and rhythm, S1, S2 normal, no murmur, click, rub or gallop  Abdomen: soft, round, tender across BLQ with some guarding, no rebound tenderness. mcburneys point tenderness, No CVA tenderness.

## 2021-06-08 NOTE — PROGRESS NOTES
[facility] Hematology and Oncology Progress Note    Patient: Omi Grimm  MRN: [id number]  Date of Service:       Assessment and Plan:    1.  Melanoma:  Recovering from diverticulitis.  Abdominal imaging looks normal.  Chest x-ray is normal.  No clinical evidence of recurrence.  He is 2-1/2 years out from diagnosis.  Return to clinic in 6 months with imaging.  Continues to follow with dermatology every 4 months.    ECOG Performance    0    Distress Assessment   0    Pain  Currently in Pain: No/denies    Diagnosis:    Melanoma involving the back: Gee level IV. 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 Stage [id number]. IIC. Diagnosed June, 2014.    Treatment:    Wide excision and sentinel node biopsy in June 5, 2014. No adjuvant therapy was given.    Interim History:    Omi returns for follow-up visit.  He comes in for a 6 month follow-up visit.  Recently recovering from diverticulitis.  No abdominal pain now.  No unintentional weight loss.  No change in energy or bowel or bladder habits.  No cough, chest pain, or shortness of breath.    Review of Systems:    Constitutional  Fatigue: None  Fever: None  Chills: None  Weight Gain: None  Weight Loss: None  Neurosensory  Peripheral Motor Neuropathy: None  Ataxia: None  Peripheral Sensory Neuropathy: None  Confusion: None  Cardiovascular  Palpitations: None  Edema: No  Phlebitis: None  Superficial thrombophlebitis: None  Pulmonary  Respiratory (WDL): Within Defined Limits  Cough: None  Dyspnea: None  Hypoxia: None  Gastrointestinal  Anorexia: None  Constipation: None  Diarrhea: None  Dysphagia: None  Esophagitis: None  Nausea: None  Pharyngitis: None  Vomiting: None  Dysgeusia: None  Dry Mouth: None  Genitourinary  Genitourinary (WDL): All genitourinary elements are within defined limits  Urinary Frequency: None  Urinary Retention: None  Urinary Tract Pain: None  Integumentary  Alopecia: None  Rash Maculo-Papular: None  Pruritus: None  Urticaria: None  Palmar-Plantar Erythrodysesthesia  Syndrome: None  Flushing: None  Patient Coping  Patient Coping: Accepting  Accompanied by  Accompanied by: Alone    Past History:    Past Medical History   Diagnosis Date     Acute bronchitis      Allergic rhinitis      Asthma      Graves disease      Hypertension      Malignant melanoma of skin of trunk, except scrotum      Unspecified hypothyroidism      Physical Exam:    Recent Vitals 1/23/2017   Height -   Weight 180 lbs 5 oz   BSA (m2) -   /80   Pulse 83   Temp 98   Temp src 1   SpO2 99     General: patient appears stated age of 51 y.o.. Nontoxic and in no distress.   HEENT: Head: atraumatic, normocephalic. Sclerae anicteric.  Chest:  Normal respiratory effort  Cardiac:  No edema.   Abdomen: abdomen is non-distended  Extremities: normal tone and muscle bulk.  Skin: no lesions or rash. Warm and dry.   CNS: alert and oriented x3. Grossly non-focal.   Psychiatric: normal mood and affect.   Nodes:  No palpable cervical, supraclavicular, or axillary adenopathy.    Lab Results:    Recent Results (from the past 168 hour(s))   Comprehensive Metabolic Panel   Result Value Ref Range    Sodium 139 136 - 145 mmol/L    Potassium 3.8 3.5 - 5.0 mmol/L    Chloride 105 98 - 107 mmol/L    CO2 29 22 - 31 mmol/L    Anion Gap, Calculation 5 5 - 18 mmol/L    Glucose 75 70 - 125 mg/dL    BUN 15 8 - 22 mg/dL    Creatinine 0.98 0.70 - 1.30 mg/dL    GFR MDRD Af Amer >60 >60 mL/min/1.73m2    GFR MDRD Non Af Amer >60 >60 mL/min/1.73m2    Bilirubin, Total 0.6 0.0 - 1.0 mg/dL    Calcium 10.4 8.5 - 10.5 mg/dL    Protein, Total 7.5 6.0 - 8.0 g/dL    Albumin 3.7 3.5 - 5.0 g/dL    Alkaline Phosphatase 69 45 - 120 U/L    AST 29 0 - 40 U/L    ALT 42 0 - 45 U/L   HM1 (CBC with Diff)   Result Value Ref Range    WBC 6.6 4.0 - 11.0 thou/uL    RBC 5.22 4.40 - 6.20 mill/uL    Hemoglobin 14.8 14.0 - 18.0 g/dL    Hematocrit 44.5 40.0 - 54.0 %    MCV 85 80 - 100 fL    MCH 28.4 27.0 - 34.0 pg    MCHC 33.3 32.0 - 36.0 g/dL    RDW 13.6 11.0 - 14.5 %     Platelets 318 140 - 440 thou/uL    MPV 7.0 7.0 - 10.0 fL    Neutrophils % 65 50 - 70 %    Lymphocytes % 26 20 - 40 %    Monocytes % 6 2 - 10 %    Eosinophils % 3 0 - 6 %    Basophils % 1 0 - 2 %    Neutrophils Absolute 4.3 2.0 - 7.7 thou/uL    Lymphocytes Absolute 1.7 0.8 - 4.4 thou/uL    Monocytes Absolute 0.4 0.0 - 0.9 thou/uL    Eosinophils Absolute 0.2 0.0 - 0.4 thou/uL    Basophils Absolute 0.0 0.0 - 0.2 thou/uL     Imaging:    Xr Chest Pa And Lateral    Result Date: 1/23/2017  XR CHEST PA AND LATERAL 1/23/2017 3:51 PM INDICATION: h/o melanoma COMPARISON: None. FINDINGS: Heart size appears normal. Lungs appear clear. Hyperinflation suggests obstructive pulmonary disease. No evidence metastasis.    Ct Abdomen Pelvis With Oral With Iv Contrast    Result Date: 1/2/2017  CT ABDOMEN PELVIS W ORAL W IV CONTRAST 1/2/2017, 4:23 PM     INDICATION: Abdominal pain. TECHNIQUE: CT abdomen and pelvis. Multiplanar reformation images (MPR). Dose reduction techniques were used. IV CONTRAST: 100 mL iohexol (Omni). COMPARISON: 7/18/2016 FINDINGS: LUNG BASES: Heart size normal. No pericardial thickening or effusion. Tiny hiatal hernia. No pneumonic consolidation or pleural effusion. ABDOMEN: Mild hepatic steatosis. No liver lesion or biliary duct dilatation. Gallstone in the dependent gallbladder without pericholecystic inflammatory stranding. Portal, splenic, and superior mesenteric veins are patent. Pancreas unremarkable. Spleen not enlarged. Adrenal glands normal. Kidneys enhance symmetrically with nonobstructive left-sided renal stones. No hydronephrosis or hydroureter. No obstructive ureteral stone. Pericolonic inflammatory stranding is seen about the sigmoid colon with numerous colonic diverticula and slightly thick-walled sigmoid colon. Findings are most suggestive of acute sigmoid diverticulitis. No intramural or pericolonic abscess identified. Additional scattered colonic diverticula. Appendix is normal. Nothing to  suggest intestinal obstruction. Abdominal aorta normal in caliber. No retroperitoneal adenopathy. No free intraperitoneal gas or well organized drainable intraabdominal fluid collection. No mesenteric root adenopathy. PELVIS: Prostate gland not enlarged. Seminal vesicles mildly distended. Bladder unremarkable. No inguinal or pelvic adenopathy. Vasectomy clips. MUSCULOSKELETAL: Negative acute. Degenerative change redemonstrated particularly at L5-S1.     CONCLUSION: 1.  Acute sigmoid diverticulitis. No intramural or pericolonic abscess. 2.  Normal appendix. 3.  Cholelithiasis. 4.  Hepatic steatosis.      Signed by: Kane Mann MD

## 2021-06-10 ENCOUNTER — COMMUNICATION - HEALTHEAST (OUTPATIENT)
Dept: ADMINISTRATIVE | Facility: HOSPITAL | Age: 56
End: 2021-06-10

## 2021-06-11 NOTE — PROGRESS NOTES
[facility] Hematology and Oncology Progress Note    Patient: Omi Grimm  MRN: [id number]  Date of Service: August 31, 2020      Assessment and Plan:    1.  Melanoma: He is now just over 6 years out from his definitive treatment.  His scans and labs look fine.  Clinically he is doing well with no significant changes in his health over the past year or new complaints to report today.  We will continue to follow him yearly.  He will let us know in the interim if he develops any new symptoms.      ECOG Performance   ECOG Performance Status: 00    Distress Assessment  Distress Assessment Score: No distress0    Pain  Currently in Pain: Yes  Pain Score (Initial OR Reassessment): 1  Location: L knee    Diagnosis:    Melanoma involving the back: Gee level IV. 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 Stage [id number]. IIC. Diagnosed June, 2014.    Treatment:    Wide excision and sentinel node biopsy in June 5, 2014. No adjuvant therapy was given.    Interim History:    Omi returns for follow-up visit.  He was last in about a year ago.  In the interim he has been doing well.  He had colon resection for recurrent diverticulitis in February of this year.  No other changes.  No acute complaints today.  Denies headaches.  Has some chronic, mild left knee pain.    Review of Systems:    Constitutional  Constitutional (WDL): All constitutional elements are within defined limits  Neurosensory  Neurosensory (WDL): All neurosensory elements are within defined limits  Cardiovascular  Cardiovascular (WDL): All cardiovascular elements are within defined limits  Pulmonary  Respiratory (WDL): Within Defined Limits  Gastrointestinal  Gastrointestinal (WDL): All gastrointestinal elements are within defined limits  Genitourinary  Genitourinary (WDL): All genitourinary elements are within defined limits  Integumentary  Integumentary (WDL): All integumentary elements are within defined limits  Patient Coping  Patient Coping:  Accepting;Open/discussion  Accompanied by  Accompanied by: Alone    Past History:    Past Medical History:   Diagnosis Date     Acute bronchitis      Allergic rhinitis      Asthma      Diverticulitis      Graves disease      Hypertension      Kidney stone 11/06/2017    first stone at age 52     Malignant melanoma of skin of trunk, except scrotum (H)     removed from back 2015     PONV (postoperative nausea and vomiting)      Unspecified hypothyroidism      Physical Exam:    Recent Vitals 8/31/2020   Height -   Weight 184 lbs 14 oz   BSA (m2) 2.06 m2   /89   Pulse 65   Temp 98.3   Temp src 1   SpO2 98   Some recent data might be hidden     General: patient appears stated age of 54 y.o.. Nontoxic and in no distress.   HEENT: Head: atraumatic, normocephalic. Sclerae anicteric.  Chest:  Normal respiratory effort  Cardiac:  No edema.   Abdomen: abdomen is non-distended  Extremities: normal tone and muscle bulk.  Skin: no lesions or rash. Warm and dry.   CNS: alert and oriented.  Psychiatric: normal mood and affect.     Lab Results:    Results for DANIELALEJANDRAJAZMÍN DIANE (MRN [id number]) as of 8/31/2020 09:14   Ref. Range 8/14/2020 07:57   Sodium Latest Ref Range: 136 - 145 mmol/L 138   Potassium Unknown SEE COMMENT   Chloride Latest Ref Range: 98 - 107 mmol/L 102   CO2 Latest Ref Range: 22 - 31 mmol/L 26   Anion Gap, Calculation Latest Ref Range: 5 - 18 mmol/L 10   BUN Latest Ref Range: 8 - 22 mg/dL 16   Creatinine Latest Ref Range: 0.70 - 1.30 mg/dL 1.08   GFR MDRD Af Amer Latest Ref Range: >60 mL/min/1.73m2 >60   GFR MDRD Non Af Amer Latest Ref Range: >60 mL/min/1.73m2 >60   Calcium Latest Ref Range: 8.5 - 10.5 mg/dL 9.5   AST Unknown SEE COMMENT   ALT Latest Ref Range: 0 - 45 U/L 20   ALBUMIN Latest Ref Range: 3.5 - 5.0 g/dL 4.0   Protein, Total Latest Ref Range: 6.0 - 8.0 g/dL 8.4 (H)   Alkaline Phosphatase Latest Ref Range: 45 - 120 U/L 61   Bilirubin, Total Latest Ref Range: 0.0 - 1.0 mg/dL 0.5   Glucose Latest  Ref Range: 70 - 125 mg/dL 98   WBC Latest Ref Range: 4.0 - 11.0 thou/uL 4.8   RBC Latest Ref Range: 4.40 - 6.20 mill/uL 5.10   Hemoglobin Latest Ref Range: 14.0 - 18.0 g/dL 14.8   Hematocrit Latest Ref Range: 40.0 - 54.0 % 44.1   MCV Latest Ref Range: 80 - 100 fL 87   MCH Latest Ref Range: 27.0 - 34.0 pg 29.0   MCHC Latest Ref Range: 32.0 - 36.0 g/dL 33.6   RDW Latest Ref Range: 11.0 - 14.5 % 13.0   Platelets Latest Ref Range: 140 - 440 thou/uL 206   MPV Latest Ref Range: 8.5 - 12.5 fL 8.4 (L)   Neutrophils % Latest Ref Range: 50 - 70 % 54   Lymphocytes % Latest Ref Range: 20 - 40 % 33   Monocytes % Latest Ref Range: 2 - 10 % 8   Eosinophils % Latest Ref Range: 0 - 6 % 5   Basophils % Latest Ref Range: 0 - 2 % 1   Neutrophils Absolute Latest Ref Range: 2.0 - 7.7 thou/uL 2.6   Lymphocytes Absolute Latest Ref Range: 0.8 - 4.4 thou/uL 1.6   Monocytes Absolute Latest Ref Range: 0.0 - 0.9 thou/uL 0.4   Eosinophils Absolute Latest Ref Range: 0.0 - 0.4 thou/uL 0.2   Basophils Absolute Latest Ref Range: 0.0 - 0.2 thou/uL 0.0     Imaging:    I personally reviewed his CT scan images.  No evidence of recurrent disease.    Ct Chest Abdomen Pelvis Without Oral With Iv Contrast    Result Date: 8/14/2020  EXAM: CT CHEST ABDOMEN PELVIS WO ORAL W IV CONTRAST LOCATION: [facility] DATE/TIME: 8/14/2020 8:47 AM INDICATION: Melanoma followup. COMPARISON: CT scan of the abdomen and pelvis, 12/26/2019 and CT scan of the chest, abdomen and pelvis, 08/08/2019. TECHNIQUE: CT scan of the chest, abdomen, and pelvis was performed following injection of IV contrast. Multiplanar reformats were obtained. Dose reduction techniques were used. CONTRAST: Iohexol (Omni) 100 mL. FINDINGS: LUNGS AND PLEURA: The lungs and pleural spaces are clear. MEDIASTINUM/AXILLAE: Heart size is normal. No lymphadenopathy. The thoracic aorta is normal in caliber. HEPATOBILIARY: Stone is present within the gallbladder. The liver is low-dense in appearance,  compatible with hepatic steatosis. PANCREAS: Normal. SPLEEN: Normal. ADRENAL GLANDS: Normal. KIDNEYS/BLADDER: Normal. BOWEL: There is a small hiatal hernia. No bowel obstruction or abnormal bowel wall thickening. Diverticulosis of the colon is present without acute diverticulitis. A bowel anastomosis involves the mid sigmoid colon region. LYMPH NODES: Normal. VASCULATURE: Unremarkable. PELVIC ORGANS: Normal. MUSCULOSKELETAL: No suspicious osseous lesions.     1.  No evidence metastatic disease involving the chest, abdomen or pelvis. 2.  Cholelithiasis. 3.  Hepatic steatosis.      Signed by: Kane Mann MD

## 2021-06-11 NOTE — PROGRESS NOTES
Patient here today for lab and CT results and follow-up visit with Dr. Mann for melanoma/AUSTIN Langston RN

## 2021-06-12 NOTE — PROGRESS NOTES
[facility] Hematology and Oncology Progress Note    Patient: Omi Grimm  MRN: [id number]  Date of Service:  July 31, 2017      Assessment and Plan:    1.  Melanoma: Imaging is reviewed.  No evidence of recurrent disease.  Return to clinic in 6 months with repeat scans.  We will image him until he is at least 5 years out from his surgery.  Currently 3 years out from his surgery.  He sees dermatology every 9 months now.    2.  Hypercalcemia: Mild and chronic.  He does not take oral calcium.  Asymptomatic.  Will check a parathyroid hormone level at his next visit.    ECOG Performance   ECOG Performance Status: 00    Distress Assessment  Distress Assessment Score: No distress0    Pain  Currently in Pain: No/denies    Diagnosis:    Melanoma involving the back: Gee level IV. 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 Stage [id number]. IIC. Diagnosed June, 2014.    Treatment:    Wide excision and sentinel node biopsy in June 5, 2014. No adjuvant therapy was given.    Interim History:    Omi returns for follow-up visit.  He comes in for a 6 month follow-up visit.  He has been feeling well.  No changes in his health since his last visit.  Denies headaches.  No bony pain or abdominal pain.    Review of Systems:    Constitutional  Constitutional (WDL): All constitutional elements are within defined limits  Neurosensory  Neurosensory (WDL): All neurosensory elements are within defined limits  Cardiovascular  Cardiovascular (WDL): All cardiovascular elements are within defined limits  Pulmonary  Respiratory (WDL): Within Defined Limits  Gastrointestinal  Gastrointestinal (WDL): All gastrointestinal elements are within defined limits  Genitourinary  Genitourinary (WDL): All genitourinary elements are within defined limits  Integumentary  Integumentary (WDL): All integumentary elements are within defined limits  Patient Coping  Patient Coping: Accepting  Accompanied by  Accompanied by: Alone    Past History:    Past Medical History:   Diagnosis  Date     Acute bronchitis      Allergic rhinitis      Asthma      Graves disease      Hypertension      Malignant melanoma of skin of trunk, except scrotum      Unspecified hypothyroidism      Physical Exam:    Recent Vitals 7/31/2017   Height -   Weight -   BSA (m2) -   /81   Pulse 80   Temp -   Temp src -   SpO2 98   Some recent data might be hidden     General: patient appears stated age of 51 y.o.. Nontoxic and in no distress.   HEENT: Head: atraumatic, normocephalic. Sclerae anicteric.  Chest:  Normal respiratory effort  Cardiac:  No edema.   Abdomen: abdomen is non-distended  Extremities: normal tone and muscle bulk.  Skin: no lesions or rash. Warm and dry.   CNS: alert and oriented x3. Grossly non-focal.   Psychiatric: normal mood and affect.     Lab Results:    Recent Results (from the past 168 hour(s))   Comprehensive Metabolic Panel   Result Value Ref Range    Sodium 140 136 - 145 mmol/L    Potassium 4.0 3.5 - 5.0 mmol/L    Chloride 106 98 - 107 mmol/L    CO2 28 22 - 31 mmol/L    Anion Gap, Calculation 6 5 - 18 mmol/L    Glucose 107 70 - 125 mg/dL    BUN 16 8 - 22 mg/dL    Creatinine 0.93 0.70 - 1.30 mg/dL    GFR MDRD Af Amer >60 >60 mL/min/1.73m2    GFR MDRD Non Af Amer >60 >60 mL/min/1.73m2    Bilirubin, Total 0.4 0.0 - 1.0 mg/dL    Calcium 11.0 (H) 8.5 - 10.5 mg/dL    Protein, Total 7.4 6.0 - 8.0 g/dL    Albumin 3.7 3.5 - 5.0 g/dL    Alkaline Phosphatase 85 45 - 120 U/L    AST 18 0 - 40 U/L    ALT 19 0 - 45 U/L   HM1 (CBC with Diff)   Result Value Ref Range    WBC 5.0 4.0 - 11.0 thou/uL    RBC 5.24 4.40 - 6.20 mill/uL    Hemoglobin 15.2 14.0 - 18.0 g/dL    Hematocrit 43.6 40.0 - 54.0 %    MCV 83 80 - 100 fL    MCH 29.0 27.0 - 34.0 pg    MCHC 34.9 32.0 - 36.0 g/dL    RDW 12.1 11.0 - 14.5 %    Platelets 222 140 - 440 thou/uL    MPV 8.9 8.5 - 12.5 fL    Neutrophils % 55 50 - 70 %    Lymphocytes % 35 20 - 40 %    Monocytes % 5 2 - 10 %    Eosinophils % 4 0 - 6 %    Basophils % 1 0 - 2 %    Neutrophils  Absolute 2.7 2.0 - 7.7 thou/uL    Lymphocytes Absolute 1.8 0.8 - 4.4 thou/uL    Monocytes Absolute 0.2 0.0 - 0.9 thou/uL    Eosinophils Absolute 0.2 0.0 - 0.4 thou/uL    Basophils Absolute 0.0 0.0 - 0.2 thou/uL     Imaging:    Ct Chest Abdomen Pelvis Without Oral With Iv Contrast    Result Date: 7/17/2017  CT CHEST, ABDOMEN, AND PELVIS 7/17/2017 8:49 AM      INDICATION: Follow-up melanoma. TECHNIQUE: CT chest, abdomen, and pelvis. Dose reduction techniques were used. IV CONTRAST: 100 mL Omnipaque 350. COMPARISON: 7/18/2016 and 12/14/2015 FINDINGS: CHEST: LUNGS: Stable benign 2 mm left upper lobe nodule on image 45. No new nodules. No focal mass or infiltrate. Central airways are patent. MEDIASTINUM: No adenopathy. No pleural nor pericardial effusion.  ABDOMEN: Cholelithiasis with no bile duct dilatation. Normal-appearing liver, spleen, pancreas, adrenals and right kidney. 4 nonobstructing calculi measuring 5 mm or lass throughout the left kidney. No retroperitoneal mass or aneurysm. Caliber of the bowel is normal with diffuse diverticulosis. PELVIS: Sigmoid diverticulosis. Normal appendix. No mass, adenopathy nor abnormal fluid collection. MUSCULOSKELETAL: Degenerative disc disease L5-S1. No findings to suggest metastatic disease.     CONCLUSION: 1.  No evidence of metastatic disease in the chest, abdomen and pelvis.      Signed by: Kane Mann MD

## 2021-06-12 NOTE — PROGRESS NOTES
ASSESSMENT/PLAN:       1. Hypothyroidism  -The patient feels that his medication is currently at the correct dosage.  His last TSH was mildly elevated so we will evaluate this today with blood work.  I did renew his medication in case his labs look fine and will adjust accordingly if they do not.  If things are going well he will follow-up here in 1 year.  - levothyroxine (SYNTHROID, LEVOTHROID) 200 MCG tablet; Take 1 tablet (200 mcg total) by mouth Daily at 6:00 am.  Dispense: 90 tablet; Refill: 3  - Thyroid Stimulating Hormone (TSH); Future  - T4, Free; Future    2. Mild intermittent asthma without complication  -His asthma control test is normal today.  He uses the Flovent on an as-needed basis mostly in the fall and winter.  Renewed this for him today as last time he had this filled with several years ago.  Follow-up in 12 months.  - fluticasone (FLOVENT HFA) 110 mcg/actuation inhaler; Inhale 2 puffs 2 (two) times a day as needed (during winter months).  Dispense: 1 Inhaler; Refill: 1    3. Screening, lipid  - Lipid Cascade; Future    4. Asymptomatic gallstones  -I discussed with the patient that he does have gallstones on his CT scan as well.  He will watch for any right upper quadrant pain or discomfort but at this time he is asymptomatic.    5. Kidney stone on left side  -Kidney stones are present as well on CT scan.  He does drink water.  Monitor for now as he does not have any symptoms.      Danuta Galicia MD      PROGRESS NOTE   7/31/2017    SUBJECTIVE:  Omi WINTERS Zanderjoseph is a 51 y.o. male  who presents for follow up.     He is overall doing ok. His thyroid seems to be well treated.  He is having no issues with the levothyroxine.    Breathing is ok. There isn't anything out of the ordinary that has been bugging him. He was able to hike 10 miles a day without issues while on vacation last week. He does not need the flovent. When he uses the rescue inhalre more often then he will restart the  flovent.     He does use the Nexium every other to every third day. Working well for him.     Just had his testing for his melanoma a few weeks ago, has his next appt today.   Chief Complaint   Patient presents with     Medication Management     Patient is here today to review his current medications, he is not fasting.      Asthma     Review asthma. Patient has not been having any troubles with the asthma, no recent flare ups.          Patient Active Problem List   Diagnosis     Multiple Environmental Allergies     Mild intermittent asthma     Pain In The Hands     Hypothyroidism     Graves' Disease     Allergic Rhinitis     Lump In / On The Skin     Malignant Melanoma Of The Back     Dysphagia     History of diverticulitis     Hypercalcemia     Tobacco abuse       Current Outpatient Prescriptions   Medication Sig Dispense Refill     albuterol (PROVENTIL HFA;VENTOLIN HFA) 90 mcg/actuation inhaler Inhale 2 puffs every 4 (four) hours as needed for wheezing or shortness of breath.       esomeprazole magnesium (NEXIUM 24 HR) 22.3 mg capsule Take 22.3 mg by mouth daily as needed.       fluticasone (FLOVENT HFA) 110 mcg/actuation inhaler Inhale 2 puffs 2 (two) times a day as needed (during winter months). 1 Inhaler 1     ibuprofen (ADVIL,MOTRIN) 200 MG tablet Take 400 mg by mouth every 6 (six) hours as needed for mild pain (1-3).        Lactobacillus rhamnosus GG (CULTURELLE) 10-15 Billion cell capsule Take 1 capsule by mouth daily.       levothyroxine (SYNTHROID, LEVOTHROID) 200 MCG tablet Take 1 tablet (200 mcg total) by mouth Daily at 6:00 am. 90 tablet 3     calcium polycarbophil (FIBERCON) 625 mg tablet Take 625 mg by mouth daily.       No current facility-administered medications for this visit.        History   Smoking Status     Never Smoker   Smokeless Tobacco     Never Used           OBJECTIVE:        No results found for this or any previous visit (from the past 240 hour(s)).    Vitals:    07/31/17 0822   BP:  130/88   Patient Site: Right Arm   Patient Position: Sitting   Cuff Size: Adult Regular   Pulse: 74   SpO2: 100%   Weight: 182 lb 4.8 oz (82.7 kg)     Weight: 182 lb 4.8 oz (82.7 kg)        Physical Exam:  GENERAL APPEARANCE: A&A, NAD, well hydrated, well nourished  SKIN:  Normal skin turgor, no lesions/rashes   HEENT: moist mucous membranes, no rhinorrhea  NECK: Normal  CV: RRR, no M/G/R   LUNGS: CTAB  ABDOMEN: S&NT, no masses   EXTREMITY: no edema   NEURO: no gross deficits

## 2021-06-13 NOTE — PROGRESS NOTES
Assessment/Plan:     1. Flank pain  Urinalysis does show hematuria but not strong indication of urinary tract infection.  I am not strongly suspicious of colitis or musculoskeletal injury.  I suspect that this may be 1 of his kidney stones has loosened and is now moving.  Treated as below.  Did recommend we could get CT to evaluate he declines but may consider if symptoms worsen or do not improve.  Patient was given kit so that if he feels he passed stone we can provide stone analysis.  Color return to care if symptoms worsen or do not improve.  - Urinalysis-UC if Indicated  - oxyCODONE-acetaminophen (PERCOCET) 5-325 mg per tablet; Take 1 tablet by mouth every 4 (four) hours as needed for pain.  Dispense: 20 tablet; Refill: 0  - ketorolac injection 30 mg (TORADOL); Inject 1 mL (30 mg total) into the shoulder, thigh, or buttocks once.  - Culture, Urine  - Urine,Microscopic  - Stone Analysis; Future  - Ambulatory referral to Kidney Stone    2. Need for influenza vaccination  Given as below  - Influenza, Seasonal,Quad Inj, 36+ MOS        Subjective:      YungSINGH Grimm is a 51 y.o. male comes in today with concern for flank pain.  He states that this been going on for 4 days it gets worse every day was keeping him up at night for sleep.  My first time meeting him he typically goes to another [facility] office.  He did have a CT scan in July showing that he had for renal stones but they have not bothered him at that time he has never passed a stone before.  He is not having any bowel changes he is not having any urination changes.  He does not feel any front abdominal pain.  He has not lifted anything heavy or do anything which he thinks can injure his back.  He has tried ibuprofen but it has not really helped much.  He would like to get a flu shot.  Also states that just last night he had pain in his ears for just a few moments it is better now he does not have any sinus pain or pressure can hear okay no other  significant concerns with his ears.    Current Outpatient Prescriptions   Medication Sig Dispense Refill     calcium polycarbophil (FIBERCON) 625 mg tablet Take 625 mg by mouth daily.       esomeprazole magnesium (NEXIUM 24 HR) 22.3 mg capsule Take 22.3 mg by mouth daily as needed.       fluticasone (FLOVENT HFA) 110 mcg/actuation inhaler Inhale 2 puffs 2 (two) times a day as needed (during winter months). 1 Inhaler 1     ibuprofen (ADVIL,MOTRIN) 200 MG tablet Take 400 mg by mouth every 6 (six) hours as needed for mild pain (1-3).        Lactobacillus rhamnosus GG (CULTURELLE) 10-15 Billion cell capsule Take 1 capsule by mouth daily.       levothyroxine (SYNTHROID, LEVOTHROID) 200 MCG tablet Take 1 tablet (200 mcg total) by mouth Daily at 6:00 am. 90 tablet 3     VENTOLIN HFA 90 mcg/actuation inhaler Inhale two (2) puff(s) every 4 hours as needed 18 g 0     oxyCODONE-acetaminophen (PERCOCET) 5-325 mg per tablet Take 1 tablet by mouth every 4 (four) hours as needed for pain. 20 tablet 0     No current facility-administered medications for this visit.      Allergies   Allergen Reactions     Penicillins Shortness Of Breath and Swelling     Dilaudid [Hydromorphone] Dizziness     Past Medical History, Family History, and Social History reviewed.  Past Medical History:   Diagnosis Date     Acute bronchitis      Allergic rhinitis      Asthma      Graves disease      Hypertension      Malignant melanoma of skin of trunk, except scrotum      Unspecified hypothyroidism      Past Surgical History:   Procedure Laterality Date     EYE SURGERY      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     HEMANGIOMA EXCISION      as an infant     WY BX/REMV,LYMPH NODE,DEEP AXILL Left 6/5/2014    Procedure:  AXILLARY LYMPH NODE DISSECTION-LEFT;  Surgeon: Sergey Glover MD;  Location: [facility];  Service: General     W[place] EXCIS SUPRATENT MENINGIOMA      Description: Craniotomy Supratentorial Excision Of Meningioma;  Recorded: 02/06/2013;   Comments: Excision of frontal facial meningioma at age 6 months (residual ptosis right eye)     SINUS SURGERY      polyposis     SINUS SURGERY  April 2011 - (x2)    polyp removal     SKIN BIOPSY       VASECTOMY  12/2011     WRIST SURGERY  2003     wrist surgery, right       Penicillins and Dilaudid [hydromorphone]  Family History   Problem Relation Age of Onset     Hypertension Mother      Social History     Social History     Marital status:      Spouse name: N/A     Number of children: N/A     Years of education: N/A     Occupational History     Not on file.     Social History Main Topics     Smoking status: Never Smoker     Smokeless tobacco: Never Used     Alcohol use No     Drug use: No     Sexual activity: Yes     Birth control/ protection: Surgical     Other Topics Concern     Not on file     Social History Narrative         Review of systems is as stated in HPI, and the remainder of the 10 system review is otherwise negative.    Objective:     Vitals:    10/06/17 0908   BP: 124/72   Patient Site: Left Arm   Patient Position: Sitting   Cuff Size: Adult Regular   Pulse: 95   Temp: 98.6  F (37  C)   SpO2: 98%   Weight: 184 lb 3.2 oz (83.6 kg)   Height: 6' (1.829 m)    Body mass index is 24.98 kg/(m^2).  Wt Readings from Last 3 Encounters:   10/06/17 184 lb 3.2 oz (83.6 kg)   07/31/17 182 lb 4.8 oz (82.7 kg)   01/23/17 180 lb 4.8 oz (81.8 kg)       General Appearance:    Alert, cooperative, no distress, appears stated age    Head:    Normocephalic, without obvious abnormality, atraumatic   Eyes:    PERRL, no conjunctivitis    Ears:    Normal TM's and external ear canals   Nose:   Mucosa normal, no drainage     or sinus tenderness   Throat:   Oropharynx is clear   Neck:   Supple, symmetrical, no adenopathy, no thyromegally, no carotid bruit        Lungs:     Clear to auscultation bilaterally, respirations unlabored   Chest Wall:    No tenderness or deformity    Heart:    Regular rate and rhythm, S1 and S2  normal, no murmur, rub    or gallop       Abdomen:     Soft, non-tender, bowel sounds active all four quadrants,     no masses, no organomegaly, minimal CVA tenderness                           Skin:   No rashes or lesions   Spine:  Cervical thoracic lumbar spine are in normal alignment without significant rotation or muscular spasm.         This note has been dictated using voice recognition software. Any grammatical or context distortions are unintentional and inherent to the software.

## 2021-06-14 NOTE — PROGRESS NOTES
Patient educated regarding stent removal procedure and possible symptoms after removal.  Patient voiced understanding of information.  Handout given to patient.  Consent form signed.    KSI Timeout    Correct patient?: Yes  Correct site?:  Yes  Correct procedure?:  Yes  Correct laterality?:  Left  Consents verified?:  Yes  Relevant lab results available?:  Yes

## 2021-06-14 NOTE — PROGRESS NOTES
Assessment/Plan:        Diagnoses and all orders for this visit:    Calculus of ureter  -     Cystoscopy with Stent Removal Education  -     Patient Stated Goal: Prevent further stones  -     ciprofloxacin HCl tablet 250 mg (CIPRO); Take 1 tablet (250 mg total) by mouth once.  -     lidocaine HCl 2 % topical jelly 10 mL (UROJET); Insert 10 mL into the urethra once.    Urinary tract stones  -     Urinalysis Macroscopic  -     Culture, Urine- Future; Future; Expected date: 12/21/17  -     Culture, Urine- Future    Other orders  -     ciprofloxacin HCl (CIPRO) 250 MG tablet;   -     lidocaine HCl (UROJET) 2 % topical jelly;       Stone Management Plan  [facility] Stone Management 11/7/2017 11/14/2017 11/21/2017   Urinary Tract Infection No suspicion of infection No suspicion of infection No suspicion of infection   Renal Colic Well controlled symptoms Inadequate outpatient management of symptoms Well controlled symptoms   Renal Failure No suspicion of renal failure No suspicion of renal failure No suspicion of renal failure   Current CT date 11/7/2017 11/13/2017 -   Right sided stones? No No -   R Stone Event No current event No current event No current event   Left sided stones? Yes Yes -   L Number of ureteral stones 1 1 -   L GSD of ureteral stones 7 7 -   L Location of ureteral stone Proximal Proximal -   L Number of kidney stones  1 1 -   L GSD of kidney stones 2 - 4 2 - 4 -   L Hydronephrosis Mild Moderate -   L Stone Event New event Established event Established event   Diagnosis date 11/7/2017 - -   Initial location of primary symptomatic stone Proximal - -   Initial GSD of primary symptomatic stone 7 - -   Post-op status - - Stent Removal   L MET Status Initiation Failure -   Failure - Symptomatic -   L Current Plan MET Clear -   MET 2 week F/U - -   Clear rationale - Symptomatic -             Subjective:      HPI  Mr. Omi Grimm is a 52 y.o.  male returning to the [facility] [place]  for early postoperative follow up for anticipated stent removal.     He returns status post left ureteroscopic laser lithotripsy for renal and ureteral stone. He has had no unanticipated post-operative events.    He has had no symptoms suspicious for infection and stent was mildly symptomatic with typical issues of flank discomfort and lower urinary tract irritation.     Flexible cystoscopy is performed and indwelling stent is removed without incident.    He will follow up in the office in one month without imaging.    He will be having regular melanoma follow up imaging in about a month.     ROS   Review of systems is negative except for HPI.    Past Medical History:   Diagnosis Date     Acute bronchitis      Allergic rhinitis      Asthma      Graves disease      Hypertension      Kidney stone 11/06/2017    first stone at age 52     Malignant melanoma of skin of trunk, except scrotum     removed from back 2015     Unspecified hypothyroidism        Past Surgical History:   Procedure Laterality Date     EYE SURGERY      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     HEMANGIOMA EXCISION      as an infant     [place] BX/REMV,LYMPH NODE,DEEP AXILL Left 6/5/2014    Procedure:  AXILLARY LYMPH NODE DISSECTION-LEFT;  Surgeon: Sergey Glover MD;  Location: [facility];  Service: General     WY CYSTO/URETERO W/LITHOTRIPSY &INDWELL STENT INSRT Left 11/14/2017    Procedure: CYSTOSCOPY, WITH LEFT URETEROSCOPY,  LASER LITHOTRIPSY STONE EXTRACTION STENT INSERTION;  Surgeon: González Lagos MD;  Location: [facility];  Service: Urology     WY EXCIS SUPRATENT MENINGIOMA      Description: Craniotomy Supratentorial Excision Of Meningioma;  Recorded: 02/06/2013;  Comments: Excision of frontal facial meningioma at age 6 months (residual ptosis right eye)     SINUS SURGERY      polyposis     SINUS SURGERY  April 2011 - (x2)    polyp removal x2     SKIN BIOPSY       VASECTOMY  12/2011     WRIST SURGERY  2003     wrist surgery,  right         Current Outpatient Prescriptions   Medication Sig Dispense Refill     calcium polycarbophil (FIBERCON) 625 mg tablet Take 625 mg by mouth daily.       esomeprazole magnesium (NEXIUM 24 HR) 22.3 mg capsule Take 22.3 mg by mouth daily as needed.       fluticasone (FLOVENT HFA) 110 mcg/actuation inhaler Inhale 2 puffs 2 (two) times a day as needed (during winter months). 1 Inhaler 1     ibuprofen (ADVIL,MOTRIN) 200 MG tablet Take 400 mg by mouth every 6 (six) hours as needed for mild pain (1-3).        Lactobacillus rhamnosus GG (CULTURELLE) 10-15 Billion cell capsule Take 1 capsule by mouth daily.       levothyroxine (SYNTHROID, LEVOTHROID) 175 MCG tablet Take 1 tablet (175 mcg total) by mouth Daily at 6:00 am. 90 tablet 3     oxyCODONE-acetaminophen (PERCOCET) 5-325 mg per tablet Take 1 tablet by mouth every 4 (four) hours as needed for pain. 20 tablet 0     VENTOLIN HFA 90 mcg/actuation inhaler Inhale two (2) puff(s) every 4 hours as needed 18 g 0     Current Facility-Administered Medications   Medication Dose Route Frequency Provider Last Rate Last Dose     ciprofloxacin HCl (CIPRO) 250 MG tablet              lidocaine HCl (UROJET) 2 % topical jelly                Allergies   Allergen Reactions     Penicillins Shortness Of Breath and Swelling     Dilaudid [Hydromorphone] Dizziness       Social History     Social History     Marital status:      Spouse name: N/A     Number of children: N/A     Years of education: N/A     Occupational History           Social History Main Topics     Smoking status: Never Smoker     Smokeless tobacco: Never Used     Alcohol use No     Drug use: No     Sexual activity: Yes     Birth control/ protection: Surgical     Other Topics Concern     Not on file     Social History Narrative       Family History   Problem Relation Age of Onset     Hypertension Mother      Heart disease Mother      pacemaker     Cancer Maternal Aunt      lung     Urolithiasis Maternal  Uncle      Lung disease Maternal Uncle      asthma     Objective:      Physical Exam  Vitals:    11/21/17 1435   BP: 142/86   Pulse: 79   Temp: 98  F (36.7  C)     General - well developed, well nourished, appropriate for age. Appears no distress at this time  Abdomen - mildly obese soft, non-tender, no hepatosplenomegaly, no masses.   - no flank tenderness, no suprapubic tenderness, kidney and bladder non-palpable  MSK - normal spinal curvature. no spinal tenderness. normal gait. muscular strength intact.  Psych - oriented to time, place, and person, normal mood and affect.        Labs   Urinalysis POC (Office):  Nitrite, UA   Date Value Ref Range Status   11/21/2017 Negative Negative Final   11/14/2017 Negative Negative Final   11/07/2017 Negative Negative Final       Lab Urinalysis:  Blood, UA   Date Value Ref Range Status   11/21/2017 Large (!) Negative Final   11/14/2017 Trace (!) Negative Final   11/07/2017 Large (!) Negative Final     Nitrite, UA   Date Value Ref Range Status   11/21/2017 Negative Negative Final   11/14/2017 Negative Negative Final   11/07/2017 Negative Negative Final     Leukocytes, UA   Date Value Ref Range Status   11/21/2017 Small (!) Negative Final   11/14/2017 Negative Negative Final   11/07/2017 Negative Negative Final     pH, UA   Date Value Ref Range Status   11/21/2017 5.0 5.0 - 8.0 Final   11/14/2017 6.0 5.0 - 8.0 Final   11/07/2017 6.0 5.0 - 8.0 Final

## 2021-06-14 NOTE — ANESTHESIA POSTPROCEDURE EVALUATION
Patient: Omi Grimm  CYSTOSCOPY, WITH LEFT URETEROSCOPY,  LASER LITHOTRIPSY STONE EXTRACTION STENT INSERTION  Anesthesia type: general    Patient location: PACU  Last vitals:   Vitals:    11/14/17 1145   BP: 149/82   Pulse: 70   Resp: 12   Temp:    SpO2: 100%     Post vital signs: stable  Level of consciousness: awake and responds to simple questions  Post-anesthesia pain: pain controlled  Post-anesthesia nausea and vomiting: no  Pulmonary: unassisted, return to baseline  Cardiovascular: stable and blood pressure at baseline  Hydration: adequate  Anesthetic events: no    QCDR Measures:  ASA# 11 - Kacie-op Cardiac Arrest: ASA11B - Patient did NOT experience unanticipated cardiac arrest  ASA# 12 - Kacie-op Mortality Rate: ASA12B - Patient did NOT die  ASA# 13 - PACU Re-Intubation Rate: ASA13B - Patient did NOT require a new airway mgmt  ASA# 10 - Composite Anes Safety: ASA10A - No serious adverse event    Additional Notes:

## 2021-06-14 NOTE — PROGRESS NOTES
ASSESSMENT/PLAN:       1. Diffuse abdominal pain  -History, exam and symptoms are consistent with a recurrence of his diverticulitis.  We discussed the differential diagnosis would also include appendicitis given the location of the pain.  He has had 2 CT scans in the last month however so I do not want to image him again if we can avoid this.  Given his history, recent CT scans I am going to treat him as if he has recurrence of diverticulitis and check basic labs as listed below.  We discussed that if his pain becomes increasingly severe or he develops vomiting he should present to the ER for reevaluation and he should let me know if he develops a fever on the next day or 2 as well.  If things are not resolving in the next 48 hours or so I think we should proceed with a CT of the abdomen and pelvis.  He is comfortable with this.  - ciprofloxacin HCl (CIPRO) 500 MG tablet; Take 1 tablet (500 mg total) by mouth 2 (two) times a day for 14 days.  Dispense: 28 tablet; Refill: 0  - metroNIDAZOLE (FLAGYL) 500 MG tablet; Take 1 tablet (500 mg total) by mouth 3 (three) times a day for 14 days.  Dispense: 42 tablet; Refill: 0  - HM1(CBC and Differential)  - Comprehensive Metabolic Panel  - Erythrocyte Sedimentation Rate  - HM1 (CBC with Diff)    2. Hypothyroidism  -Thyroid dosage was adjusted approximately 2 months ago, updating TSH and T4 today.  - Thyroid Stimulating Hormone (TSH)  - T4, Free        Danuta Galicia MD      PROGRESS NOTE   11/27/2017    SUBJECTIVE:  Omi Grimm is a 52 y.o. male  who presents for follow up.     He has had diverticulitis in the past and he feels that he has symptoms c/w recurrenne. He has had pain that started yesterday morning, felt like stomach cramping and got worse through the day yesterday and through the night as well. He was discussing it with his wife this morning and she recommended that he be seen. Temp was 99.1 at home today. His appetite is down some. He has had  some diarrhea. He has had no nausea with this, did have some with the kidney stones. He is urinating ok and drinking ok. He has seen no blood in his stool. He is having diarrhea maybe every few hours, just a little at a time. RLQ tenderness last time.     His appetite has been down since the kidney stones that were about two weeks ago.   Chief Complaint   Patient presents with     Abdominal Pain     Patient started to have slight abd pain yesterday that progressively got worse today. Patient is now having lower abdominal pain that goes across his abdomen and diarrhea.         Patient Active Problem List   Diagnosis     Multiple Environmental Allergies     Mild intermittent asthma     Pain In The Hands     Hypothyroidism     Graves' Disease     Allergic Rhinitis     Lump In / On The Skin     Malignant Melanoma Of The Back     Dysphagia     History of diverticulitis     Hypercalcemia     Tobacco abuse     Asymptomatic gallstones     Calculus of kidney     Calculus of ureter       Current Outpatient Prescriptions   Medication Sig Dispense Refill     calcium polycarbophil (FIBERCON) 625 mg tablet Take 625 mg by mouth daily.       esomeprazole magnesium (NEXIUM 24 HR) 22.3 mg capsule Take 22.3 mg by mouth daily as needed.       fluticasone (FLOVENT HFA) 110 mcg/actuation inhaler Inhale 2 puffs 2 (two) times a day as needed (during winter months). 1 Inhaler 1     ibuprofen (ADVIL,MOTRIN) 200 MG tablet Take 400 mg by mouth every 6 (six) hours as needed for mild pain (1-3).        Lactobacillus rhamnosus GG (CULTURELLE) 10-15 Billion cell capsule Take 1 capsule by mouth daily.       levothyroxine (SYNTHROID, LEVOTHROID) 175 MCG tablet Take 1 tablet (175 mcg total) by mouth Daily at 6:00 am. 90 tablet 3     oxyCODONE-acetaminophen (PERCOCET) 5-325 mg per tablet Take 1 tablet by mouth every 4 (four) hours as needed for pain. 20 tablet 0     VENTOLIN HFA 90 mcg/actuation inhaler Inhale two (2) puff(s) every 4 hours as needed  18 g 0     ciprofloxacin HCl (CIPRO) 500 MG tablet Take 1 tablet (500 mg total) by mouth 2 (two) times a day for 14 days. 28 tablet 0     metroNIDAZOLE (FLAGYL) 500 MG tablet Take 1 tablet (500 mg total) by mouth 3 (three) times a day for 14 days. 42 tablet 0     No current facility-administered medications for this visit.        History   Smoking Status     Never Smoker   Smokeless Tobacco     Never Used           OBJECTIVE:        Recent Results (from the past 240 hour(s))   Urinalysis Macroscopic   Result Value Ref Range    Color, UA Brown (!) Colorless, Yellow, Straw, Light Yellow    Clarity, UA Cloudy (!) Clear    Glucose, UA Negative Negative    Bilirubin, UA Negative Negative    Ketones, UA Negative Negative    Specific Gravity, UA >=1.030 1.005 - 1.030    Blood, UA Large (!) Negative    pH, UA 5.0 5.0 - 8.0    Protein, UA >=300 mg/dL (!) Negative mg/dL    Urobilinogen, UA 0.2 E.U./dL 0.2 E.U./dL, 1.0 E.U./dL    Nitrite, UA Negative Negative    Leukocytes, UA Small (!) Negative   Culture, Urine- Future   Result Value Ref Range    Culture No Growth    Erythrocyte Sedimentation Rate   Result Value Ref Range    Sed Rate 31 (H) 0 - 15 mm/hr   HM1 (CBC with Diff)   Result Value Ref Range    WBC 11.6 (H) 4.0 - 11.0 thou/uL    RBC 4.76 4.40 - 6.20 mill/uL    Hemoglobin 14.0 14.0 - 18.0 g/dL    Hematocrit 41.0 40.0 - 54.0 %    MCV 86 80 - 100 fL    MCH 29.5 27.0 - 34.0 pg    MCHC 34.3 32.0 - 36.0 g/dL    RDW 12.0 11.0 - 14.5 %    Platelets 248 140 - 440 thou/uL    MPV 6.5 (L) 7.0 - 10.0 fL    Neutrophils % 77 (H) 50 - 70 %    Lymphocytes % 15 (L) 20 - 40 %    Monocytes % 5 2 - 10 %    Eosinophils % 3 0 - 6 %    Basophils % 1 0 - 2 %    Neutrophils Absolute 8.9 (H) 2.0 - 7.7 thou/uL    Lymphocytes Absolute 1.7 0.8 - 4.4 thou/uL    Monocytes Absolute 0.6 0.0 - 0.9 thou/uL    Eosinophils Absolute 0.3 0.0 - 0.4 thou/uL    Basophils Absolute 0.1 0.0 - 0.2 thou/uL       Vitals:    11/27/17 1453   BP: 120/84   Patient Site:  Left Arm   Patient Position: Sitting   Cuff Size: Adult Regular   Pulse: 88   Temp: 98.3  F (36.8  C)   TempSrc: Oral   SpO2: 98%   Weight: 176 lb 9.6 oz (80.1 kg)     Weight: 176 lb 9.6 oz (80.1 kg)        Physical Exam:  GENERAL APPEARANCE: A&A, NAD, well hydrated, well nourished, moving slowly  SKIN:  Normal skin turgor, no lesions/rashes   HEENT: moist mucous membranes, no rhinorrhea  NECK: Normal  CV: RRR, no M/G/R   LUNGS: CTAB  ABDOMEN: Soft, mild diffuse lower abdominal ttp, no guarding, some McBurney's TTP as well, no reboudn.    EXTREMITY: no edema   NEURO: no gross deficits

## 2021-06-14 NOTE — PROGRESS NOTES
Assessment/Plan:        Diagnoses and all orders for this visit:    Calculus of ureter  -     Patient Stated Goal: Pass my stone  -     CT Abdomen Pelvis Without Oral Without IV Contrast; Future; Expected date: 11/21/17    Calculus of urinary tract  -     Urinalysis Macroscopic      Stone Management Plan  [facility] Stone Management 11/7/2017   Urinary Tract Infection No suspicion of infection   Renal Colic Well controlled symptoms   Renal Failure No suspicion of renal failure   Current CT date 11/7/2017   Right sided stones? No   R Stone Event No current event   Left sided stones? Yes   L Number of ureteral stones 1   L GSD of ureteral stones 7   L Location of ureteral stone Proximal   L Number of kidney stones  1   L GSD of kidney stones 2 - 4   L Hydronephrosis Mild   L Stone Event New event   Diagnosis date 11/7/2017   Initial location of primary symptomatic stone Proximal   Initial GSD of primary symptomatic stone 7   L MET Status Initiation   L Current Plan MET   MET 2 week F/U             Subjective:      HPI  Mr. Omi Grimm is a 52 y.o.  male presenting to the [facility] [place] for a new problem.    He is a recurrent unidentified composition stone former who has not required stone clearance procedures. He has not previously participated in stone risk evaluation. He has no identified modifiable stone risk factors. He has no identified non-modifiable stone risk factors.    He has been having vague left sided flank pain for at least 3-4 weeks. Pain became more significant overnight and after initial evaluation by primary care who have directed him to [facility] for consultation. He denies fever or chills. No nausea or vomiting. He does have known stones which were demonstrated on previous evaluation for melena and diverticulitis. Pain is well controlled at this time.    CT scan is personally reviewed and demonstrates a atypical 7 x 3 mm elongated left proximal stone and a 3 mm left upper pole  stone. Mild hydronephrosis.    Significant labs from presentation include severe hematuria.    PLAN  Will proceed with medical expulsive therapy. Risks and benefits were detailed of medical expulsive therapy including probability of stone passage, recurrent renal colic, and requirement of emergency medical and/or surgical care and further imaging. Patient verbalized understanding. Patient agrees with plan as discussed. He will return in 2 weeks with low dose CT scan.      Over the counter symptom control medications of ibuprofen and Dramamine were recommended.   ROS   Review of Systems  A 12 point comprehensive review of systems is negative except for HPI    Past Medical History:   Diagnosis Date     Acute bronchitis      Allergic rhinitis      Asthma      Graves disease      Hypertension      Kidney stone 11/06/2017    first stone at age 52     Malignant melanoma of skin of trunk, except scrotum      Unspecified hypothyroidism        Past Surgical History:   Procedure Laterality Date     EYE SURGERY      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     HEMANGIOMA EXCISION      as an infant     [place] BX/REMV,LYMPH NODE,DEEP AXILL Left 6/5/2014    Procedure:  AXILLARY LYMPH NODE DISSECTION-LEFT;  Surgeon: Sergey Glover MD;  [facility]: [facility];  Service: General     [place] EXCIS SUPRATENT MENINGIOMA      Description: Craniotomy Supratentorial Excision Of Meningioma;  Recorded: 02/06/2013;  Comments: Excision of frontal facial meningioma at age 6 months (residual ptosis right eye)     SINUS SURGERY      polyposis     SINUS SURGERY  April 2011 - (x2)    polyp removal x2     SKIN BIOPSY       VASECTOMY  12/2011     WRIST SURGERY  2003     wrist surgery, right         Current Outpatient Prescriptions   Medication Sig Dispense Refill     calcium polycarbophil (FIBERCON) 625 mg tablet Take 625 mg by mouth daily.       esomeprazole magnesium (NEXIUM 24 HR) 22.3 mg capsule Take 22.3 mg by mouth daily as needed.        fluticasone (FLOVENT HFA) 110 mcg/actuation inhaler Inhale 2 puffs 2 (two) times a day as needed (during winter months). 1 Inhaler 1     ibuprofen (ADVIL,MOTRIN) 200 MG tablet Take 400 mg by mouth every 6 (six) hours as needed for mild pain (1-3).        Lactobacillus rhamnosus GG (CULTURELLE) 10-15 Billion cell capsule Take 1 capsule by mouth daily.       levothyroxine (SYNTHROID, LEVOTHROID) 175 MCG tablet Take 1 tablet (175 mcg total) by mouth Daily at 6:00 am. 90 tablet 3     oxyCODONE-acetaminophen (PERCOCET) 5-325 mg per tablet Take 1 tablet by mouth every 4 (four) hours as needed for pain. 20 tablet 0     VENTOLIN HFA 90 mcg/actuation inhaler Inhale two (2) puff(s) every 4 hours as needed 18 g 0     No current facility-administered medications for this visit.        Allergies   Allergen Reactions     Penicillins Shortness Of Breath and Swelling     Dilaudid [Hydromorphone] Dizziness       Social History     Social History     Marital status:      Spouse name: N/A     Number of children: N/A     Years of education: N/A     Occupational History           Social History Main Topics     Smoking status: Never Smoker     Smokeless tobacco: Never Used     Alcohol use No     Drug use: No     Sexual activity: Yes     Birth control/ protection: Surgical     Other Topics Concern     Not on file     Social History Narrative       Family History   Problem Relation Age of Onset     Hypertension Mother      Heart disease Mother      pacemaker     Cancer Maternal Aunt      lung     Urolithiasis Maternal Uncle      Lung disease Maternal Uncle      asthma       Objective:      Physical Exam  Vitals:    11/07/17 1523   BP: 122/84   Pulse: 95   Temp: 98.6  F (37  C)     General - well developed, well nourished, appropriate for age. Appears no distress at this time   Heart - regular rate and rhythm, no murmur  Respiratory - normal effort, clear to auscultation, good air entry without adventitious noises  Abdomen -  mildly obese soft, non-tender, no hepatosplenomegaly, no masses.   - left flank  mild tenderness, no suprapubic tenderness, kidney and bladder non-palpable  MSK - normal spinal curvature. no spinal tenderness. normal gait. muscular strength intact.  Neurology - cranial nerves II-XII grossly intact, normal sensation, no unsteadiness  Skin - intact, no bruising, no gouty tophi  Psych - oriented to time, place, and person, normal mood and affect.        Labs  Urinalysis POC (Office):  Nitrite, UA   Date Value Ref Range Status   11/07/2017 Negative Negative Final   10/06/2017 Negative Negative Final   01/02/2017 Negative Negative Final       Lab Urinalysis:  Blood, UA   Date Value Ref Range Status   11/07/2017 Large (!) Negative Final   10/06/2017 Trace (!) Negative Final   01/02/2017 Negative Negative Final     Nitrite, UA   Date Value Ref Range Status   11/07/2017 Negative Negative Final   10/06/2017 Negative Negative Final   01/02/2017 Negative Negative Final     Leukocytes, UA   Date Value Ref Range Status   11/07/2017 Negative Negative Final   10/06/2017 Negative Negative Final   01/02/2017 Negative Negative Final     pH, UA   Date Value Ref Range Status   11/07/2017 6.0 5.0 - 8.0 Final   10/06/2017 5.5 5.0 - 8.0 Final   01/02/2017 5.5 4.5 - 8.0 Final

## 2021-06-14 NOTE — PROGRESS NOTES
Patient is the  of an employee at our office.  She had initially scheduled an office visit for follow-up on flank pain.  I had seen him about a month ago and she reports that he only took 1 of the Percocet and the pain improved.  She states last night his pain came back and it was quite severe.  He does have some of the Percocet left.  He has a history of kidney stones and diverticulitis.  No reported change in bowel.  Unsure if there is been a significant change in bladder.  States he was interested in getting the CT scan that he had previously declined.  We were able to get him in to get a CT this morning showing hydronephrosis due to kidney stone.  I discussed results with patient over the phone and with his wife.  [facility] so we will cancel his appointment.

## 2021-06-14 NOTE — TELEPHONE ENCOUNTER
RN cannot approve Refill Request    RN can NOT refill this medication medication not on med list. Last office visit: 6/24/2019 Danuta Galicia MD Last Physical: 10/5/2018 Last MTM visit: Visit date not found Last visit same specialty: 12/26/2019 Perri Thrasher MD.  Next visit within 3 mo: Visit date not found  Next physical within 3 mo: Visit date not found      Brandy Stratton, [facility] Triage/Med Refill 1/3/2021    Requested Prescriptions   Pending Prescriptions Disp Refills     VENTOLIN HFA 90 mcg/actuation inhaler [Pharmacy Med Name: VENTOLIN HFA AER] 18 g 0     Sig: INHALE TWO (2) PUFF(S) EVERY 4 HOURS AS NEEDED       Albuterol/Levalbuterol Refill Protocol Passed - 12/31/2020  9:56 AM        Passed - PCP or prescribing provider visit in last year     Last office visit with prescriber/PCP: 6/24/2019 Danuta Galicia MD OR same dept: Visit date not found OR same specialty: 12/26/2019 Perri Thrasher MD Last physical: 10/5/2018       Next appt within 3 mo: Visit date not found  Next physical within 3 mo: Visit date not found  Prescriber OR PCP: Danuta Galicia MD  Last diagnosis associated with med order: 1. Mild intermittent asthma without complication  - VENTOLIN HFA 90 mcg/actuation inhaler [Pharmacy Med Name: VENTOLIN HFA AER]; INHALE TWO (2) PUFF(S) EVERY 4 HOURS AS NEEDED  Dispense: 18 g; Refill: 0    2. Hypothyroidism, unspecified type  - levothyroxine (SYNTHROID, LEVOTHROID) 175 MCG tablet [Pharmacy Med Name: LEVOTHYROXIN 175MCG]; TAKE 1 TABLET ON TUES, THURS, SATURDAY AND SUNDAY.  Dispense: 48 tablet; Refill: 0    If protocol passes may refill for 6 months if within 3 months of last provider visit (or a total of 9 months). If patient requesting >1 inhaler per month refill x 6 months and have patient make appointment with provider.             levothyroxine (SYNTHROID, LEVOTHROID) 175 MCG tablet [Pharmacy Med Name: LEVOTHYROXIN 175MCG] 48 tablet 0      Sig: TAKE 1 TABLET ON TUES, THURS, SATURDAY AND SUNDAY.       Thyroid Hormones Protocol Passed - 12/31/2020  9:56 AM        Passed - Provider visit in past 12 months or next 3 months     Last office visit with prescriber/PCP: 6/24/2019 Danuta Galicia MD OR same dept: Visit date not found OR same specialty: 12/26/2019 Perri Thrasher MD  Last physical: 10/5/2018 Last MTM visit: Visit date not found   Next visit within 3 mo: Visit date not found  Next physical within 3 mo: Visit date not found  Prescriber OR PCP: Danuta Galicia MD  Last diagnosis associated with med order: 1. Mild intermittent asthma without complication  - VENTOLIN HFA 90 mcg/actuation inhaler [Pharmacy Med Name: VENTOLIN HFA AER]; INHALE TWO (2) PUFF(S) EVERY 4 HOURS AS NEEDED  Dispense: 18 g; Refill: 0    2. Hypothyroidism, unspecified type  - levothyroxine (SYNTHROID, LEVOTHROID) 175 MCG tablet [Pharmacy Med Name: LEVOTHYROXIN 175MCG]; TAKE 1 TABLET ON TUES, THURS, SATURDAY AND SUNDAY.  Dispense: 48 tablet; Refill: 0    If protocol passes may refill for 12 months if within 3 months of last provider visit (or a total of 15 months).             Passed - TSH on file in past 12 months for patient age 12 & older     TSH   Date Value Ref Range Status   02/10/2020 5.78 (H) 0.30 - 5.00 uIU/mL Final

## 2021-06-14 NOTE — ANESTHESIA PREPROCEDURE EVALUATION
Anesthesia Evaluation      Patient summary reviewed   No history of anesthetic complications     Airway   Mallampati: II  Neck ROM: full   Pulmonary - normal exam   (+) asthma  mild,  well controlled,                          Cardiovascular - normal exam  Exercise tolerance: > or = 4 METS  (+) hypertension, ,     ECG reviewed        Neuro/Psych - negative ROS     Endo/Other    (+) hypothyroidism,   (-) hyperthyroidism     GI/Hepatic/Renal    (+) GERD well controlled,   chronic renal disease,      Other findings: Multiple Environmental Allergies     Mild intermittent asthma    Pain In The Hands    Hypothyroidism    Graves' Disease    Allergic Rhinitis    Lump In / On The Skin    Malignant Melanoma Of The Back    Dysphagia    History of diverticulitis    Hypercalcemia    Tobacco abuse    Asymptomatic gallstones    Kidney stone on left side          Dental    (+) chipped    Comment: Missing teeth.                       Anesthesia Plan  Planned anesthetic: general LMA    ASA 2   Induction: intravenous   Anesthetic plan and risks discussed with: patient and spouse  Anesthesia plan special considerations: antiemetics,   Post-op plan: routine recovery

## 2021-06-14 NOTE — PROGRESS NOTES
ASSESSMENT/PLAN:       1. LLQ pain  -Still quite suspicious for recurrent diverticulitis.  He restarted antibiotics approximately 36 hours ago, continue on these for now and let me know tomorrow how he is doing.  If things are not starting to resolve, I think we should proceed with a repeat CT of the abdomen and pelvis to rule out signs of abscess or other infections and if still consistent with diverticulitis I will change him to Bactrim plus Flagyl rather than the ciprofloxacin.  The meantime updating lab work today to look for obvious signs of worsening issues.  He will call if things worsen.  - C. difficile Toxigenic by PCR; Future  - Culture, Stool; Future  - HM1(CBC and Differential)  - Erythrocyte Sedimentation Rate  - HM1 (CBC with Diff)      Danuta Galicia MD      PROGRESS NOTE   12/18/2017    SUBJECTIVE:  Omi Grimm is a 52 y.o. male  who presents for follow up on abdominal pain.     He started antibiotics 3 weeks ago exactly. He took his last dose last Monday and did feel better until Friday (3 days ago). Had recurrence of cramping, diarrhea and the typical abdomial pain related to his diverticulitis. He has had no measured fevers, was 99.3 two days ago. He has had no nausea or vomiting.  He did call into the nurse triage line who then called the on-call physician who restarted antibiotics approximately 36 hours ago.  He did have some nausea before the meclizine, taking it at nighttime which is helping with the nausea.  He is having frequent bowel movements but going only a little at a time.  Chief Complaint   Patient presents with     Follow-up     Patient is here today to follow up on diverticulitis. Patient states last weekend the abd cramps came back.          Patient Active Problem List   Diagnosis     Multiple Environmental Allergies     Mild intermittent asthma     Pain In The Hands     Hypothyroidism     Graves' Disease     Allergic Rhinitis     Lump In / On The Skin      Malignant Melanoma Of The Back     Dysphagia     History of diverticulitis     Hypercalcemia     Tobacco abuse     Asymptomatic gallstones     Calculus of kidney     Calculus of ureter       Current Outpatient Prescriptions   Medication Sig Dispense Refill     calcium polycarbophil (FIBERCON) 625 mg tablet Take 625 mg by mouth daily.       ciprofloxacin HCl (CIPRO) 500 MG tablet Take 1 tablet (500 mg total) by mouth 2 (two) times a day for 14 days. 28 tablet 0     esomeprazole magnesium (NEXIUM 24 HR) 22.3 mg capsule Take 22.3 mg by mouth daily as needed.       fluticasone (FLOVENT HFA) 110 mcg/actuation inhaler Inhale 2 puffs 2 (two) times a day as needed (during winter months). 1 Inhaler 1     ibuprofen (ADVIL,MOTRIN) 200 MG tablet Take 400 mg by mouth every 6 (six) hours as needed for mild pain (1-3).        Lactobacillus rhamnosus GG (CULTURELLE) 10-15 Billion cell capsule Take 1 capsule by mouth daily.       levothyroxine (SYNTHROID, LEVOTHROID) 175 MCG tablet Take 1 tablet (175 mcg total) by mouth Daily at 6:00 am. 90 tablet 3     meclizine (ANTIVERT) 12.5 mg tablet Take 1-2 tablets (12.5-25 mg total) by mouth 3 (three) times a day as needed for dizziness or nausea. 30 tablet 1     metroNIDAZOLE (FLAGYL) 500 MG tablet Take 1 tablet (500 mg total) by mouth 3 (three) times a day for 14 days. 42 tablet 0     oxyCODONE-acetaminophen (PERCOCET) 5-325 mg per tablet Take 1 tablet by mouth every 4 (four) hours as needed for pain. 20 tablet 0     VENTOLIN HFA 90 mcg/actuation inhaler Inhale two (2) puff(s) every 4 hours as needed 18 g 0     No current facility-administered medications for this visit.        History   Smoking Status     Never Smoker   Smokeless Tobacco     Never Used           OBJECTIVE:        No results found for this or any previous visit (from the past 240 hour(s)).    Vitals:    12/18/17 0822   BP: 126/78   Patient Site: Left Arm   Patient Position: Sitting   Cuff Size: Adult Regular   Pulse: 85    SpO2: 98%   Weight: 172 lb 14.4 oz (78.4 kg)     Weight: 172 lb 14.4 oz (78.4 kg)        Physical Exam:  GENERAL APPEARANCE: A&A, NAD, well hydrated, well nourished  SKIN:  Normal skin turgor, no lesions/rashes   HEENT: moist mucous membranes, no rhinorrhea  NECK: Normal  CV: RRR, no M/G/R   LUNGS: CTAB  ABDOMEN: Soft, moderate left lower quadrant and suprapubic tenderness to palpation, hyperactive bowel sounds, no guarding or rebound  EXTREMITY: no edema   NEURO: no gross deficits

## 2021-06-14 NOTE — ANESTHESIA CARE TRANSFER NOTE
Last vitals:   Vitals:    11/14/17 1128   BP: 159/83   Pulse: 68   Resp: 16   Temp: 37.1  C (98.7  F)   SpO2: 100%     Patient's level of consciousness is awake  Spontaneous respirations: yes  Maintains airway independently: yes  Dentition unchanged: yes  Oropharynx: oropharynx clear of all foreign objects    QCDR Measures:  ASA# 20 - Surgical Safety Checklist: WHO surgical safety checklist completed prior to induction  PQRS# 430 - Adult PONV Prevention: 4558F - Pt received => 2 anti-emetic agents (different classes) preop & intraop  ASA# 8 - Peds PONV Prevention: NA - Not pediatric patient, not GA or 2 or more risk factors NOT present  PQRS# 424 - Kacie-op Temp Management: 4559F - At least one body temp DOCUMENTED => 35.5C or 95.9F within required timeframe  PQRS# 426 - PACU Transfer Protocol: - Transfer of care checklist used  ASA# 14 - Acute Post-op Pain: ASA14B - Patient did NOT experience pain >= 7 out of 10

## 2021-06-14 NOTE — PROGRESS NOTES
Assessment/Plan:        Diagnoses and all orders for this visit:    Calculus of ureter  -     Ureteroscopy Education  -     oxyCODONE (ROXICODONE) 5 MG immediate release tablet; 1-2 tablets every four hours as required for severe pain  Dispense: 30 tablet; Refill: 0  -     ketorolac injection 30 mg (TORADOL); Inject 1 mL (30 mg total) into the shoulder, thigh, or buttocks once.    Urinary tract stones  -     Urinalysis Macroscopic    Calculus of kidney  -     Patient Stated Goal: Know what to expect after surgery  -     Ureteroscopy Education    Other orders  -     Cancel: Place sequential compression device; Standing  -     Cancel: Verify informed consent; Standing  -     Cancel: Diet NPO; Standing  -     Cancel: levoFLOXacin 500 mg/100 mL IVPB 500 mg (LEVAQUIN); Infuse 100 mL (500 mg total) into a venous catheter 60 minutes before surgery or procedure (On Call to OR).  -     ketorolac (TORADOL) 60 mg/2 mL injection;       Stone Management Plan  [facility] Stone Management 11/7/2017 11/14/2017   Urinary Tract Infection No suspicion of infection No suspicion of infection   Renal Colic Well controlled symptoms Inadequate outpatient management of symptoms   Renal Failure No suspicion of renal failure No suspicion of renal failure   Current CT date 11/7/2017 11/13/2017   Right sided stones? No No   R Stone Event No current event No current event   Left sided stones? Yes Yes   L Number of ureteral stones 1 1   L GSD of ureteral stones 7 7   L Location of ureteral stone Proximal Proximal   L Number of kidney stones  1 1   L GSD of kidney stones 2 - 4 2 - 4   L Hydronephrosis Mild Moderate   L Stone Event New event Established event   Diagnosis date 11/7/2017 -   Initial location of primary symptomatic stone Proximal -   Initial GSD of primary symptomatic stone 7 -   L MET Status Initiation Failure   Failure - Symptomatic   L Current Plan MET Clear   MET 2 week F/U -   Clear rationale - Symptomatic             Subjective:       HPI  Mr. Omi Grimm is a 52 y.o.  male returning to the [facility] for medical expulsive therapy follow up.     On last encounter, his 7 mm stone was in left proximal ureter with mild hydronephrosis. He has had to visit the Emergency Department for inadequate pain control.    Symptoms have been poorly controlled without relief from prescribed symptom control protocol. Significant current symptoms include:  left flank pain and nausea. Pertinent negative current symptoms include:  fever and chills. He remains in significant pain despite ED management.    He was given IM toradol in clinic.     New CT scan was personally reviewed and demonstrates no progression of stone with stable mild hydronephrosis.      He has failed adequate duration of medical expulsive therapy and will proceed to the operating room for ureteroscopic stone clearance. Risks and benefits were detailed of ureteroscopic stone clearance including potential issues of urinary or systemic infection, ureteral injury, inaccessible stone, incomplete stone clearance, multiple surgeries, and stent related symptoms of urgency, frequency and hematuria         ROS   A 12 point comprehensive review of systems is negative except for HPI.    Past Medical History:   Diagnosis Date     Acute bronchitis      Allergic rhinitis      Asthma      Graves disease      Hypertension      Kidney stone 11/06/2017    first stone at age 52     Malignant melanoma of skin of trunk, except scrotum     removed from back 2015     Unspecified hypothyroidism        Past Surgical History:   Procedure Laterality Date     EYE SURGERY      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     HEMANGIOMA EXCISION      as an infant     KS BX/REMV,LYMPH NODE,DEEP AXILL Left 6/5/2014    Procedure:  AXILLARY LYMPH NODE DISSECTION-LEFT;  Surgeon: Sergey Glover MD;  Location: [facility];  Service: General     KS EXCIS SUPRATENT MENINGIOMA      Description:  Craniotomy Supratentorial Excision Of Meningioma;  Recorded: 02/06/2013;  Comments: Excision of frontal facial meningioma at age 6 months (residual ptosis right eye)     SINUS SURGERY      polyposis     SINUS SURGERY  April 2011 - (x2)    polyp removal x2     SKIN BIOPSY       VASECTOMY  12/2011     WRIST SURGERY  2003     wrist surgery, right         Current Outpatient Prescriptions   Medication Sig Dispense Refill     calcium polycarbophil (FIBERCON) 625 mg tablet Take 625 mg by mouth daily.       esomeprazole magnesium (NEXIUM 24 HR) 22.3 mg capsule Take 22.3 mg by mouth daily as needed.       fluticasone (FLOVENT HFA) 110 mcg/actuation inhaler Inhale 2 puffs 2 (two) times a day as needed (during winter months). 1 Inhaler 1     ibuprofen (ADVIL,MOTRIN) 200 MG tablet Take 400 mg by mouth every 6 (six) hours as needed for mild pain (1-3).        Lactobacillus rhamnosus GG (CULTURELLE) 10-15 Billion cell capsule Take 1 capsule by mouth daily.       levothyroxine (SYNTHROID, LEVOTHROID) 175 MCG tablet Take 1 tablet (175 mcg total) by mouth Daily at 6:00 am. 90 tablet 3     oxyCODONE-acetaminophen (PERCOCET) 5-325 mg per tablet Take 1 tablet by mouth every 4 (four) hours as needed for pain. 20 tablet 0     VENTOLIN HFA 90 mcg/actuation inhaler Inhale two (2) puff(s) every 4 hours as needed 18 g 0     oxyCODONE (ROXICODONE) 5 MG immediate release tablet 1-2 tablets every four hours as required for severe pain 30 tablet 0     Current Facility-Administered Medications   Medication Dose Route Frequency Provider Last Rate Last Dose     ketorolac (TORADOL) 60 mg/2 mL injection                Allergies   Allergen Reactions     Penicillins Shortness Of Breath and Swelling     Dilaudid [Hydromorphone] Dizziness       Social History     Social History     Marital status:      Spouse name: N/A     Number of children: N/A     Years of education: N/A     Occupational History           Social History Main Topics      Smoking status: Never Smoker     Smokeless tobacco: Never Used     Alcohol use No     Drug use: No     Sexual activity: Yes     Birth control/ protection: Surgical     Other Topics Concern     Not on file     Social History Narrative       Family History   Problem Relation Age of Onset     Hypertension Mother      Heart disease Mother      pacemaker     Cancer Maternal Aunt      lung     Urolithiasis Maternal Uncle      Lung disease Maternal Uncle      asthma     Objective:      Physical Exam  Vitals:    11/14/17 0840   BP: 138/86   Pulse: 79   Temp: 98.2  F (36.8  C)     General - well developed, well nourished, appropriate for age. Appears fatigued, weak and colicky discomfort   Heart - regular rate and rhythm, no murmur  Respiratory - normal effort, clear to auscultation, good air entry without adventitious noises  Abdomen - mildly obese soft, non-tender, no hepatosplenomegaly, no masses.   - left flank  moderate tenderness, no suprapubic tenderness, kidney and bladder non-palpable  MSK - normal spinal curvature. no spinal tenderness. normal gait. muscular strength intact.  Neurology - cranial nerves II-XII grossly intact, normal sensation, no unsteadiness  Skin - intact, no bruising, no gouty tophi  Psych - oriented to time, place, and person, normal mood and affect.        Labs   Urinalysis POC (Office):  Nitrite, UA   Date Value Ref Range Status   11/14/2017 Negative Negative Final   11/07/2017 Negative Negative Final   10/06/2017 Negative Negative Final       Lab Urinalysis:  Blood, UA   Date Value Ref Range Status   11/14/2017 Trace (!) Negative Final   11/07/2017 Large (!) Negative Final   10/06/2017 Trace (!) Negative Final     Nitrite, UA   Date Value Ref Range Status   11/14/2017 Negative Negative Final   11/07/2017 Negative Negative Final   10/06/2017 Negative Negative Final     Leukocytes, UA   Date Value Ref Range Status   11/14/2017 Negative Negative Final   11/07/2017 Negative Negative Final    10/06/2017 Negative Negative Final     pH, UA   Date Value Ref Range Status   11/14/2017 6.0 5.0 - 8.0 Final   11/07/2017 6.0 5.0 - 8.0 Final   10/06/2017 5.5 5.0 - 8.0 Final    and Acute Labs   CBC   WBC   Date Value Ref Range Status   11/13/2017 9.2 4.0 - 11.0 thou/uL Final   07/31/2017 5.0 4.0 - 11.0 thou/uL Final   01/23/2017 6.6 4.0 - 11.0 thou/uL Final     Hemoglobin   Date Value Ref Range Status   11/13/2017 15.9 14.0 - 18.0 g/dL Final   07/31/2017 15.2 14.0 - 18.0 g/dL Final   01/23/2017 14.8 14.0 - 18.0 g/dL Final     Platelets   Date Value Ref Range Status   11/13/2017 237 140 - 440 thou/uL Final   07/31/2017 222 140 - 440 thou/uL Final   01/23/2017 318 140 - 440 thou/uL Final   , C Reactive Protein    CRP   Date Value Ref Range Status   11/13/2017 0.7 0.0 - 0.8 mg/dL Final   03/01/2016 8.7 (H) 0.0 - 0.8 mg/dL Final   02/06/2013 <0.1 <0.9 mg/dL Final   , Renal Panel  [facility]  Creatinine   Date Value Ref Range Status   11/13/2017 1.54 (H) 0.70 - 1.30 mg/dL Final   07/31/2017 0.93 0.70 - 1.30 mg/dL Final   01/23/2017 0.98 0.70 - 1.30 mg/dL Final     Potassium   Date Value Ref Range Status   11/13/2017 4.1 3.5 - 5.0 mmol/L Final   07/31/2017 4.0 3.5 - 5.0 mmol/L Final   01/23/2017 3.8 3.5 - 5.0 mmol/L Final     Calcium   Date Value Ref Range Status   11/13/2017 11.7 (H) 8.5 - 10.5 mg/dL Final   07/31/2017 11.0 (H) 8.5 - 10.5 mg/dL Final   01/23/2017 10.4 8.5 - 10.5 mg/dL Final    and Urine Culture    Culture   Date Value Ref Range Status   10/06/2017 No Growth  Final

## 2021-06-15 NOTE — PROGRESS NOTES
[facility] Hematology and Oncology Progress Note    Patient: Omi Grimm  MRN: [id number]  Date of Service:  January 29, 2018      Assessment and Plan:    1.  Melanoma: Imaging is reviewed.  Looks good.  No evidence of any malignancy or melanoma recurrence.  He has now 3-1/2 years out from surgery.  We will continue to see him every 6 months.  He recently saw the dermatologist and had no suspicious lesions.  We will continue to image until he is 5 years out.    2.  Hypercalcemia: His parathyroid hormone is now elevated.  It was normal on July 31.  He recently had treatment for kidney stones.  He remains hypercalcemic.  He appears to have some clinically significant disease.  Probably should have parathyroidectomy.  I relayed this information to the patient.  With his primary care provider no to facilitate further workup.  Will check a vitamin D today as replacement of low levels can sometimes improve serum calcium.    ECOG Performance   ECOG Performance Status: 00    Distress Assessment  Distress Assessment Score: No distress0    Pain  Currently in Pain: No/denies    Diagnosis:    Melanoma involving the back: Gee level IV. 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 Stage [id number]. IIC. Diagnosed June, 2014.    Treatment:    Wide excision and sentinel node biopsy in June 5, 2014. No adjuvant therapy was given.    Interim History:    Omi returns for follow-up visit.  He comes in for a 6 month follow-up visit.  Overall he is been doing okay.  He did have some kidney stones which are treated in November.  They were very painful.  No other acute complaints today.  Denies headaches, bony pain, cough, or shortness of breath.  Energy and appetite are baseline.    Review of Systems:    Constitutional  Constitutional (WDL): All constitutional elements are within defined limits  Neurosensory  Neurosensory (WDL): All neurosensory elements are within defined limits  Cardiovascular  Cardiovascular (WDL): All cardiovascular elements are within  "defined limits  Pulmonary  Respiratory (WDL): Within Defined Limits  Gastrointestinal  Gastrointestinal (WDL): All gastrointestinal elements are within defined limits  Genitourinary  Genitourinary (WDL): All genitourinary elements are within defined limits  Integumentary  Integumentary (WDL): All integumentary elements are within defined limits  Patient Coping  Patient Coping: Accepting  Accompanied by  Accompanied by: Alone    Past History:    Past Medical History:   Diagnosis Date     Acute bronchitis      Allergic rhinitis      Asthma      Graves disease      Hypertension      Kidney stone 11/06/2017    first stone at age 52     Malignant melanoma of skin of trunk, except scrotum     removed from back 2015     Unspecified hypothyroidism      Physical Exam:    Recent Vitals 1/29/2018   Height 6' 0\"   Weight 174 lbs 5 oz   BSA (m2) 2 m2   /75   Pulse 84   Temp -   Temp src -   SpO2 100   Some recent data might be hidden     General: patient appears stated age of 52 y.o.. Nontoxic and in no distress.   HEENT: Head: atraumatic, normocephalic. Sclerae anicteric.  Chest:  Normal respiratory effort  Cardiac:  No edema.   Abdomen: abdomen is soft, non-distended  Extremities: normal tone and muscle bulk.  Skin: no lesions or rash. Warm and dry.   CNS: alert and oriented x3. Grossly non-focal.   Psychiatric: normal mood and affect.     Lab Results:    Recent Results (from the past 168 hour(s))   Comprehensive Metabolic Panel   Result Value Ref Range    Sodium 140 136 - 145 mmol/L    Potassium 4.2 3.5 - 5.0 mmol/L    Chloride 104 98 - 107 mmol/L    CO2 28 22 - 31 mmol/L    Anion Gap, Calculation 8 5 - 18 mmol/L    Glucose 98 70 - 125 mg/dL    BUN 16 8 - 22 mg/dL    Creatinine 1.07 0.70 - 1.30 mg/dL    GFR MDRD Af Amer >60 >60 mL/min/1.73m2    GFR MDRD Non Af Amer >60 >60 mL/min/1.73m2    Bilirubin, Total 0.5 0.0 - 1.0 mg/dL    Calcium 11.5 (H) 8.5 - 10.5 mg/dL    Protein, Total 7.6 6.0 - 8.0 g/dL    Albumin 3.8 3.5 - " 5.0 g/dL    Alkaline Phosphatase 76 45 - 120 U/L    AST 19 0 - 40 U/L    ALT 21 0 - 45 U/L   HM1 (CBC with Diff)   Result Value Ref Range    WBC 4.5 4.0 - 11.0 thou/uL    RBC 5.05 4.40 - 6.20 mill/uL    Hemoglobin 14.4 14.0 - 18.0 g/dL    Hematocrit 42.9 40.0 - 54.0 %    MCV 85 80 - 100 fL    MCH 28.5 27.0 - 34.0 pg    MCHC 33.6 32.0 - 36.0 g/dL    RDW 13.2 11.0 - 14.5 %    Platelets 225 140 - 440 thou/uL    MPV 8.8 8.5 - 12.5 fL    Neutrophils % 54 50 - 70 %    Lymphocytes % 34 20 - 40 %    Monocytes % 7 2 - 10 %    Eosinophils % 5 0 - 6 %    Basophils % 1 0 - 2 %    Neutrophils Absolute 2.4 2.0 - 7.7 thou/uL    Lymphocytes Absolute 1.5 0.8 - 4.4 thou/uL    Monocytes Absolute 0.3 0.0 - 0.9 thou/uL    Eosinophils Absolute 0.2 0.0 - 0.4 thou/uL    Basophils Absolute 0.0 0.0 - 0.2 thou/uL     Imaging:    I personally reviewed his CT scan images.  No evidence of recurrent disease.    Ct Chest Abdomen Pelvis Without Oral With Iv Contrast    Result Date: 1/22/2018  CT CHEST, ABDOMEN, AND PELVIS 1/22/2018 8:38 AM      INDICATION: Melanoma follow-up. TECHNIQUE: CT chest, abdomen, and pelvis. Dose reduction techniques were used. IV CONTRAST: Iohexol (Omni) 100 mL COMPARISON: CT chest abdomen and pelvis 7/17/2017, CT abdomen and pelvis of 11/13/2017. FINDINGS: CHEST: Benign-appearing 2 mm nodule in the left upper lobe on image 46 of series 3 is unchanged. No new pulmonary nodules or masses. No focal consolidation. No pneumothorax or pleural effusion. There is no mediastinal or hilar lymphadenopathy or pericardial effusion. Small bilateral axillary lymph nodes are unchanged. No lymphadenopathy. No pericardial effusion.  ABDOMEN: Cholelithiasis without biliary dilatation. The left ureteral stones seen on the CT of 11/13/2017 are no longer present. There is no hydronephrosis. No renal masses. The liver, spleen, pancreas and adrenal glands are normal. There is no abdominal  lymphadenopathy. No bowel obstruction. PELVIS: Sigmoid  diverticulosis without evidence for diverticulitis or abscess. No pelvic lymphadenopathy. Prostate is mildly inhomogeneous. Small bilateral inguinal lymph nodes. No lymphadenopathy by size criteria. No bladder stone or mass. MUSCULOSKELETAL: No subcutaneous nodules or masses. No lytic or blastic bony lesions or acute fractures. There is degenerative disc disease at L5-S1 with disc space narrowing and vacuum phenomenon.     CONCLUSION: 1.  No evidence for metastatic disease in the chest, abdomen or pelvis. No significant change from 7/17/2017. 2.  Cholelithiasis. 3.  Interval resolution of left ureteral stones and left-sided hydronephrosis.      Signed by: Kane Mann MD

## 2021-06-15 NOTE — PROGRESS NOTES
Assessment/Plan:        Diagnoses and all orders for this visit:    Calculus of ureter    Calculus of kidney  -     Stone Formation, 24 Hour Urine x2; Standing  -     Renal Function Profile; Future; Expected date: 1/29/18  -     Uric Acid; Future; Expected date: 1/29/18  -     Calcium, Ionized, Measured; Future; Expected date: 1/29/18  -     Parathyroid Hormone Intact with Minerals; Future; Expected date: 1/29/18  -     Patient Stated Goal: Prevent further stones  -     Patient Stated Goal: Manage future stone episodes  -     24 Hour Urine Collection Steps Education    Urinary tract stones  -     Urinalysis Macroscopic      Stone Management Plan  [facility] Stone Management 11/14/2017 11/21/2017 1/15/2018   Urinary Tract Infection No suspicion of infection No suspicion of infection No suspicion of infection   Renal Colic Inadequate outpatient management of symptoms Well controlled symptoms Asymptomatic at this time   Renal Failure No suspicion of renal failure No suspicion of renal failure No suspicion of renal failure   Current CT date 11/13/2017 - -   Right sided stones? No - No   R Stone Event No current event No current event No current event   Left sided stones? Yes - -   L Number of ureteral stones 1 - -   L GSD of ureteral stones 7 - -   L Location of ureteral stone Proximal - -   L Number of kidney stones  1 - -   L GSD of kidney stones 2 - 4 - -   L Hydronephrosis Moderate - -   L Stone Event Established event Established event Resolved event   Diagnosis date - - -   Initial location of primary symptomatic stone - - -   Initial GSD of primary symptomatic stone - - -   Resolved date - - 1/15/2018   Post-op status - Stent Removal No imaging   L MET Status Failure - -   Failure Symptomatic - -   L Current Plan Clear - -   MET - - -   Clear rationale Symptomatic - -         Subjective:      HPI  Mr. Omi WINTERS Zanderjoseph is a 52 y.o.  male returning to the [facility] for late postoperative  follow-up.     He returns status post Left ureteroscopic laser lithotripsy for renal and ureteral stones. He has had no unanticipated events.     He is asymptomatic at present. He denies symptoms of fever, chills, flank pain, nausea, vomiting, urinary frequency and dysuria.    Imaging was not performed today as he has upcoming surveillance CT imaging in coming weeks for history of melanoma.    Stone composition was 10 % calcium oxalate and 90 % calcium phosphate (apatite).     PLAN    51 yo M with hx of recurrent stone disease s/p ureteroscopic clearance of left ureteral and renal calculi, no postoperative imaging at this time. Asymptomatic.    He is at risk for ongoing active stone disease and will initiate stone risk evaluation. Serum stone risk chemistries including parathyroid hormone and ionized calcium will be obtained before next visit. Two 24 hour urine collections and dietary journal will be obtained at earliest covenience..    Patient also seen and examined by Bozena Xiao PA-C       ROS   Review of systems is negative except for HPI.    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     HEMANGIOMA EXCISION      as an infant     AL BX/REMV,LYMPH NODE,DEEP AXILL Left 6/5/2014    Procedure:  AXILLARY LYMPH NODE DISSECTION-LEFT;  Surgeon: Sergey Glover MD;  Location: [facility];  Service: General     AL CYSTO/URETERO W/LITHOTRIPSY &INDWELL STENT INSRT Left 11/14/2017    Procedure: CYSTOSCOPY, WITH LEFT URETEROSCOPY,  LASER LITHOTRIPSY STONE EXTRACTION STENT INSERTION;  Surgeon: González Lagos MD;  Location: [facility];   Service: Urology     KS EXCIS SUPRATENT MENINGIOMA      Description: Craniotomy Supratentorial Excision Of Meningioma;  Recorded: 02/06/2013;  Comments: Excision of frontal facial meningioma at age 6 months (residual ptosis right eye)     SINUS SURGERY      polyposis     SINUS SURGERY  April 2011 - (x2)    polyp removal x2     SKIN BIOPSY       VASECTOMY  12/2011     WRIST SURGERY  2003     wrist surgery, right         Current Outpatient Prescriptions   Medication Sig Dispense Refill     calcium polycarbophil (FIBERCON) 625 mg tablet Take 625 mg by mouth daily.       esomeprazole magnesium (NEXIUM 24 HR) 22.3 mg capsule Take 22.3 mg by mouth daily as needed.       fluticasone (FLOVENT HFA) 110 mcg/actuation inhaler Inhale 2 puffs 2 (two) times a day as needed (during winter months). 1 Inhaler 1     Lactobacillus rhamnosus GG (CULTURELLE) 10-15 Billion cell capsule Take 1 capsule by mouth daily.       levothyroxine (SYNTHROID, LEVOTHROID) 175 MCG tablet Take 1 tablet (175 mcg total) by mouth Daily at 6:00 am. 90 tablet 3     meclizine (ANTIVERT) 12.5 mg tablet Take 1-2 tablets (12.5-25 mg total) by mouth 3 (three) times a day as needed for dizziness or nausea. 30 tablet 1     VENTOLIN HFA 90 mcg/actuation inhaler Inhale two (2) puff(s) every 4 hours as needed 18 g 0     No current facility-administered medications for this visit.        Allergies   Allergen Reactions     Penicillins Shortness Of Breath and Swelling     Dilaudid [Hydromorphone] Dizziness       Social History     Social History     Marital status:      Spouse name: N/A     Number of children: N/A     Years of education: N/A     Occupational History           Social History Main Topics     Smoking status: Never Smoker     Smokeless tobacco: Never Used     Alcohol use No     Drug use: No     Sexual activity: Yes     Birth control/ protection: Surgical     Other Topics Concern     Not on file     Social History Narrative       Family  History   Problem Relation Age of Onset     Hypertension Mother      Heart disease Mother      pacemaker     Cancer Maternal Aunt      lung     Urolithiasis Maternal Uncle      Lung disease Maternal Uncle      asthma     Objective:      Physical Exam  Vitals:    01/15/18 0825   BP: (!) 152/99   Pulse: 73   Temp: 98.2  F (36.8  C)     General - well developed, well nourished, appropriate for age. Appears no distress at this time  Abdomen - soft, non-tender, no hepatosplenomegaly, no masses.   - no flank tenderness, no suprapubic tenderness, kidney and bladder non-palpable  MSK - normal spinal curvature. no spinal tenderness. normal gait. muscular strength intact.  Psych - oriented to time, place, and person, normal mood and affect.    Labs   Urinalysis POC (Office):  Nitrite, UA   Date Value Ref Range Status   01/15/2018 Negative Negative Final   11/21/2017 Negative Negative Final   11/14/2017 Negative Negative Final       Lab Urinalysis:  Blood, UA   Date Value Ref Range Status   01/15/2018 Negative Negative Final   11/21/2017 Large (!) Negative Final   11/14/2017 Trace (!) Negative Final     Nitrite, UA   Date Value Ref Range Status   01/15/2018 Negative Negative Final   11/21/2017 Negative Negative Final   11/14/2017 Negative Negative Final     Leukocytes, UA   Date Value Ref Range Status   01/15/2018 Negative Negative Final   11/21/2017 Small (!) Negative Final   11/14/2017 Negative Negative Final     pH, UA   Date Value Ref Range Status   01/15/2018 5.5 5.0 - 8.0 Final   11/21/2017 5.0 5.0 - 8.0 Final   11/14/2017 6.0 5.0 - 8.0 Final

## 2021-06-16 PROBLEM — K57.90 DIVERTICULOSIS: Status: ACTIVE | Noted: 2020-01-16

## 2021-06-16 PROBLEM — N20.9 URINARY TRACT STONES: Status: ACTIVE | Noted: 2017-07-31

## 2021-06-16 PROBLEM — E21.3 HYPERPARATHYROIDISM (H): Status: ACTIVE | Noted: 2018-08-09

## 2021-06-16 PROBLEM — Z90.49 S/P PARTIAL COLECTOMY: Status: ACTIVE | Noted: 2020-02-14

## 2021-06-16 PROBLEM — K80.20 ASYMPTOMATIC GALLSTONES: Status: ACTIVE | Noted: 2017-07-31

## 2021-06-17 NOTE — PATIENT INSTRUCTIONS - HE
Patient Instructions by Sergey Navarrete PA-C at 2019 10:40 AM     Author: Sergey Navarrete PA-C Service: -- Author Type: Physician Assistant    Filed: 2019 11:21 AM Encounter Date: 2019 Status: Signed    : Sergey Navarrete PA-C (Physician Assistant)       Over-the-counter nasal steroid spray, follow packaging directions  Over-the-counter Mucinex, follow packaging directions  Hot packs 3 times per day to the forehead and face over the tender sinuses  Distal saline salt water or saline irrigation with Lamy Gandara  Antibiotic as written below.  Risks and benefits of the medication were gone over.  Indication for return to see urgent care or family practice provider for reevaluation and treatment.    Patient to follow up with Primary Care provider regarding elevated blood pressure.      As a result of our visit today, here are the action plans for you:    1. Medication(s) to stop: There are no discontinued medications.    2. Medication(s) to start or change:   Medications Ordered   Medications   ? doxycycline (MONODOX) 100 MG capsule     Sig: Take 1 capsule (100 mg total) by mouth 2 (two) times a day for 10 days.     Dispense:  20 capsule     Refill:  0   ? fluticasone propionate (FLONASE ALLERGY RELIEF) 50 mcg/actuation nasal spray     Si spray into each nostril daily.     Dispense:  16 g     Refill:  0       3. Other instructions: Yes      Acute Bacterial Rhinosinusitis (ABRS)  Acute bacterial rhinosinusitis (ABRS) is an infection of your nasal cavity and sinuses. Its caused by bacteria. Acute means that youve had symptoms for less than 12 weeks.  Understanding your sinuses  The nasal cavity is the large air-filled space behind your nose. The sinuses are a group of spaces formed by the bones of your face. They connect with your nasal cavity. ABRS causes the tissue lining these spaces to become inflamed. Mucus may not drain normally. This leads to facial pain and other symptoms.  What causes  ABRS?  ABRS most often follows an upper respiratory infection caused by a virus. Bacteria then infect the lining of your nasal cavity and sinuses. But you can also get ABRS if you have:    Nasal allergies    Long-term nasal swelling and congestion not caused by allergies    Blockage in the nose  Symptoms of ABRS  The symptoms of ABRS may be different for each person, and can include:    Nasal congestion    Runny nose    Fluid draining from the nose down the throat (postnasal drip)    Headache    Cough    Pain in the sinuses    Thick, colored fluid from the nose (mucus)    Fever  Diagnosing ABRS  ABRS may be diagnosed if youve had an upper respiratory infection like a cold and cough for longer than 10 to 14 days. Your health care provider will ask about your symptoms and your medical history. The provider will check your vital signs, including your temperature. Youll have a physical exam. The health care provider will check your ears, nose, and throat. You likely wont need any tests. If ABRS comes back, you may have a culture or other tests.  Treatment for ABRS  Treatment may include:    Antibiotic medicine. This is for symptoms that last for at least 10 to 14 days.    Nasal corticosteroid medicine. Drops or spray used in the nose can lessen swelling and congestion.    Over-the-counter pain medicine. This is to lessen sinus pain and pressure.    Nasal decongestant medicine. Spray or drops may help to lessen congestion. Do not use them for more than a few days.    Salt wash (saline irrigation). This can help to loosen mucus.  Possible complications of ABRS  ABRS may come back or become long-term (chronic).  In rare cases, ABRS may cause complications such as:     Inflamed tissue around the brain and spinal cord (meningitis)    Inflamed tissue around the eyes (orbital cellulitis)    Inflamed bones around the sinuses (osteitis)  These problems may need to be treated in a hospital with intravenous (IV) antibiotic  medicine or surgery.  When to call the health care provider  Call your health care provider if you have any of the following:    Symptoms that dont get better, or get worse    Symptoms that dont get better after 3 to 5 days on antibiotics    Trouble seeing    Swelling around your eyes    Confusion or trouble staying awake   Date Last Reviewed: 3/3/2015    [id number] [employer]. [address] [place] [id number]. All rights reserved. This information is not intended as a substitute for professional medical care. Always follow your healthcare professional's instructions.

## 2021-06-19 NOTE — PROGRESS NOTES
[facility] Hematology and Oncology Progress Note    Patient: Omi Grimm  MRN: [id number]  Date of Service:  August 6, 2018      Assessment and Plan:    1.  Melanoma: His imaging was reviewed.  There is no evidence of recurrence.  Clinically he is doing well with no symptoms.  Just over 4 years out from diagnosis and treatment.  Will continue on imaging him every 6 months.  He will let us know in the interim if he develops any new symptoms.      2.  Hypercalcemia: At his last visit we reviewed his high parathyroid hormone levels.  I do not think anything has been done in this regard regarding the hyper calcium anemia.  He remains hypercalcemic.  I took the liberty of putting in a consultation to endocrinology for further evaluation and treatment recommendations.  He was treated for kidney stones in November 2017.    ECOG Performance   ECOG Performance Status: 00    Distress Assessment  Distress Assessment Score: No distress0    Pain  Currently in Pain: No/denies    Diagnosis:    Melanoma involving the back: Gee level IV. 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 Stage [id number]. IIC. Diagnosed June, 2014.    Treatment:    Wide excision and sentinel node biopsy in June 5, 2014. No adjuvant therapy was given.    Interim History:    Omi returns for follow-up visit.  He comes in for a 6 month follow-up visit.  He has been doing well.  No changes in his health over the past 6 months.  No pain.  No headaches.  No new skin lesions.    Review of Systems:    Constitutional  Constitutional (WDL): All constitutional elements are within defined limits  Neurosensory  Neurosensory (WDL): All neurosensory elements are within defined limits  Cardiovascular  Cardiovascular (WDL): All cardiovascular elements are within defined limits  Pulmonary  Respiratory (WDL): Within Defined Limits  Gastrointestinal  Gastrointestinal (WDL): All gastrointestinal elements are within defined limits  Genitourinary  Genitourinary (WDL): All genitourinary elements  "are within defined limits  Integumentary  Integumentary (WDL): All integumentary elements are within defined limits  Patient Coping  Patient Coping: Accepting  Accompanied by  Accompanied by: Alone    Past History:    Past Medical History:   Diagnosis Date     Acute bronchitis      Allergic rhinitis      Asthma      Graves disease      Hypertension      Kidney stone 11/06/2017    first stone at age 52     Malignant melanoma of skin of trunk, except scrotum (H)     removed from back 2015     Unspecified hypothyroidism      Physical Exam:    Recent Vitals 8/6/2018   Height 6' 0\"   Weight 178 lbs 6 oz   BSA (m2) 2.03 m2   /84   Pulse 64   Temp -   Temp src -   SpO2 99   Some recent data might be hidden     General: patient appears stated age of 52 y.o.. Nontoxic and in no distress.   HEENT: Head: atraumatic, normocephalic. Sclerae anicteric.  Chest:  Normal respiratory effort  Cardiac:  No edema.   Abdomen: abdomen is non-distended  Extremities: normal tone and muscle bulk.  Skin: no lesions or rash. Warm and dry.   CNS: alert and oriented. Grossly non-focal.   Psychiatric: normal mood and affect.     Lab Results:    Recent Results (from the past 168 hour(s))   Comprehensive Metabolic Panel   Result Value Ref Range    Sodium 137 136 - 145 mmol/L    Potassium 4.2 3.5 - 5.0 mmol/L    Chloride 103 98 - 107 mmol/L    CO2 26 22 - 31 mmol/L    Anion Gap, Calculation 8 5 - 18 mmol/L    Glucose 107 70 - 125 mg/dL    BUN 18 8 - 22 mg/dL    Creatinine 1.10 0.70 - 1.30 mg/dL    GFR MDRD Af Amer >60 >60 mL/min/1.73m2    GFR MDRD Non Af Amer >60 >60 mL/min/1.73m2    Bilirubin, Total 0.5 0.0 - 1.0 mg/dL    Calcium 11.5 (H) 8.5 - 10.5 mg/dL    Protein, Total 8.0 6.0 - 8.0 g/dL    Albumin 4.0 3.5 - 5.0 g/dL    Alkaline Phosphatase 84 45 - 120 U/L    AST 18 0 - 40 U/L    ALT 19 0 - 45 U/L   HM1 (CBC with Diff)   Result Value Ref Range    WBC 6.2 4.0 - 11.0 thou/uL    RBC 5.29 4.40 - 6.20 mill/uL    Hemoglobin 15.3 14.0 - 18.0 " g/dL    Hematocrit 45.5 40.0 - 54.0 %    MCV 86 80 - 100 fL    MCH 28.9 27.0 - 34.0 pg    MCHC 33.6 32.0 - 36.0 g/dL    RDW 12.7 11.0 - 14.5 %    Platelets 226 140 - 440 thou/uL    MPV 8.8 8.5 - 12.5 fL    Neutrophils % 59 50 - 70 %    Lymphocytes % 32 20 - 40 %    Monocytes % 6 2 - 10 %    Eosinophils % 3 0 - 6 %    Basophils % 1 0 - 2 %    Neutrophils Absolute 3.6 2.0 - 7.7 thou/uL    Lymphocytes Absolute 2.0 0.8 - 4.4 thou/uL    Monocytes Absolute 0.4 0.0 - 0.9 thou/uL    Eosinophils Absolute 0.2 0.0 - 0.4 thou/uL    Basophils Absolute 0.0 0.0 - 0.2 thou/uL     Imaging:    I personally reviewed his CT scan images.  No evidence of recurrent disease.    Ct Chest Abdomen Pelvis Without Oral With Iv Contrast    Result Date: 8/2/2018  CT CHEST, ABDOMEN, AND PELVIS 8/2/2018 6:56 AM      INDICATION: Melanoma follow-up. TECHNIQUE: CT chest, abdomen, and pelvis. Dose reduction techniques were used. IV CONTRAST: Iohexol (Omni) 100 mL COMPARISON:  CT dated 1/22/2018 p.m. FINDINGS: CHEST: There is minimal bronchial wall thickening. Biapical pleural parenchymal scarring is again seen. A punctate calcified left upper lobe nodule is again seen (series 3 image 54). A few smaller 1 to 2 mm left upper lobe nodules are again seen and may also be partially calcified (image 38, for example). No new pulmonary nodules. Normal size heart. Small hiatal hernia. No thoracic lymphadenopathy by size criteria.  Normal-sized axillary lymph nodes have not significantly changed in size.  ABDOMEN: Normal appearance of the spleen. Normal pancreas and adrenal glands. No hepatic lesions. There is a single calcified gallstone. No biliary ductal dilatation. Normal kidneys and ureters. Normal stomach. Normal caliber of the small bowel.  No abdominal lymphadenopathy. PELVIS: Nondistended urinary bladder. Normal size of the prostate gland. There is colonic diverticulosis. No acute diverticulitis. Normal appendix. No pelvic lymphadenopathy. Vasectomy  noted. MUSCULOSKELETAL: No new aggressive bone lesions.     CONCLUSION: 1.  No new lymphadenopathy in the chest, abdomen, or pelvis. No new pulmonary nodules. Punctate calcified left upper lobe nodules have not changed. 2.  Diverticulosis. 3.  Cholelithiasis. 4.  Small hiatal hernia. 5.  Minimal bronchial wall thickening is nonspecific though could be seen with bronchitis.      Signed by: Kane Mann MD

## 2021-06-19 NOTE — PROGRESS NOTES
Patient here today for labs and 6 month follow-up with Dr. Mann for malignant melanoma/AUSTIN Langston RN

## 2021-06-19 NOTE — LETTER
Letter by Danuta Galicia MD at      Author: Danuta Galicia MD Service: -- Author Type: --    Filed:  Encounter Date: 6/24/2019 Status: (Other)         Asthma Action Plan    Patient Name: Omi Grimm  Patient YOB: 1965    Doctor's Name: Danuta Galicia    Emergency Contact:              Severity Classification: Intermittent    What triggers my asthma: colds and allergies    GREEN ZONE: Doing Well   No cough, wheeze, chest tightness or shortness of breath during the day or night  Can do your usual activities    Take these medicines before exercise if your asthma is exercise-induced:  Medicine How Much to Take When to take it   albuterol  (also known as ProAir, Ventolin and Proventil) 2 puffs 15-30 minutes prior to exercise or sports     YELLOW ZONE: Asthma is Getting Worse   Cough, wheeze, chest tightness or shortness of breath or  Waking at night due to asthma, or  Can do some, but not all, usual activities.    Keep taking green zone medications and add quick-relief medicine:  Quick Relief Medicine How Much to Take When to take it   albuterol  (also known as ProAir, Ventolin and Proventil) 2 puffs every 4 hours as needed     If you do not feel better and your symptoms do not return to the green zone after one hour of the quick relief medication, then:    Take quick relief treatment again. Call your clinician within 1 hour.    Contact your clinician if you are using quick relief medication more than 2 times per week.    RED ZONE: Medical Alert!   Very short of breath, or  Quick relief medications have not helped, or  Cannot do usual activities, or  Symptoms are same or worse after 24 hours in the Yellow Zone.    Continue green zone medicines and add:  Quick Relief Medicine Dose When to take it   albuterol  (also known as ProAir, Ventolin and Proventil) 2 puffs may repeat every 20 minutes for up to 1 hour     IF ANY OF THESE ARE HAPPENING, SEEK EMERGENCY HELP  AND CALL 911!   You are struggling to breathe and are uncomfortable or  There is simply no clear improvement and you are worried about how to get through the next 30 minutes or  Trouble walking and talking due to shortness of breath, or  Lips or fingernails are blue    Provider signature:  Electronically Signed by Danuta Galicia   Date: 06/27/19

## 2021-06-20 NOTE — ANESTHESIA PREPROCEDURE EVALUATION
Anesthesia Evaluation      Patient summary reviewed     Airway   Mallampati: II  Neck ROM: full   Pulmonary     breath sounds clear to auscultation  (+) asthma                           Cardiovascular   Exercise tolerance: > or = 4 METS  (+) hypertension, ,     Rhythm: regular        Neuro/Psych      Endo/Other    (+) hypothyroidism (Hx of graves),      GI/Hepatic/Renal    Chronic renal disease: Stones.     Other findings:   NPO 7 PM               Multiple Environmental Allergies     Mild intermittent asthma    Pain In The Hands    Hypothyroidism    Graves' Disease    Allergic Rhinitis    Lump In / On The Skin    Malignant Melanoma Of The Back    Dysphagia    History of diverticulitis    Hypercalcemia    Asymptomatic gallstones    Urinary tract stones    Hyperparathyroidism (H)            Dental - normal exam                        Anesthesia Plan  Planned anesthetic: general endotracheal    ASA 2   Induction: intravenous   Anesthetic plan and risks discussed with: patient and spouse  Anesthesia plan special considerations: antiemetics,   Post-op plan: routine recovery        Ame Gutierres MD  Staff Anesthesiologist  [facility]  10/9/18

## 2021-06-20 NOTE — LETTER
Letter by Kane Mann MD at      Author: Kane Mann MD Service: -- Author Type: --    Filed:  Encounter Date: 6/22/2020 Status: (Other)                   Office Hours: Monday - Friday 8:00 - 4:30PM    Omi Grimm  [address]  [place] [address]           June 22, 2020      Dear Omi:    It looks as though you are due to see Dr. Mann in August. If you would like to schedule this please call [phone]         Sincerely,        [facility]

## 2021-06-20 NOTE — PROGRESS NOTES
Progress Note    Reason for Visit:  Chief Complaint     Thyroid Problem          Progress Note:    HPI: This this patient is seen saltation at the request of the primary care physician because of primary hyperparathyroidism.  The patient is a 52-year-old patient who had kidney stones and had surgery for it last year and was found to have hypercalcemia calcium is 11.8 parathyroid hormone 133 phosphorus 2.325 vitamin D 30.3 125 vitamin D 67.0 creatinine 1.09    Family history uncle had kidney stones.    Patient is  does not smoke or drink he has acid reflux on Nexium.    He had Graves' disease and 89 treated with radioactive iodine ablation.  He was taking calcium supplements and I did advise him to stop that    He was diagnosed also was with stage II melanoma on his back that was removed surgically no chemotherapy he follows up with oncology once every 6 months.        Component      Latest Ref Rng & Units 1/15/2018 1/29/2018 8/6/2018 8/13/2018              1:33 PM   Sodium      136 - 145 mmol/L  140 137    Potassium      3.5 - 5.0 mmol/L  4.2 4.2 4.4   Chloride      98 - 107 mmol/L  104 103    CO2      22 - 31 mmol/L  28 26    Anion Gap, Calculation      5 - 18 mmol/L  8 8    Glucose      70 - 125 mg/dL  98 107    BUN      8 - 22 mg/dL  16 18    Creatinine      0.70 - 1.30 mg/dL 0.90 1.07 1.10 1.09   GFR MDRD Af Amer      >60 mL/min/1.73m2 >60 >60 >60 >60   GFR MDRD Non Af Amer      >60 mL/min/1.73m2 >60 >60 >60 >60   Bilirubin, Total      0.0 - 1.0 mg/dL  0.5 0.5    Calcium      8.5 - 10.5 mg/dL 10.8 (H) 11.5 (H) 11.5 (H) 11.9 (H)   Protein, Total      6.0 - 8.0 g/dL  7.6 8.0    ALBUMIN      3.5 - 5.0 g/dL  3.8 4.0    Alkaline Phosphatase      45 - 120 U/L  76 84    AST      0 - 40 U/L  19 18    ALT      0 - 45 U/L  21 19    PTH      10 - 86 pg/mL 133 (H)  110 (H)    Phosphorus      2.5 - 4.5 mg/dL 2.0 (L)   2.3 (L)   Vitamin D, Total (25-Hydroxy)      30.0 - 80.0 ng/mL  30.8  30.3   1,25 Dihydroxyvitamin  D      19.9 - 79.3 pg/mL    67.0     Component      Latest Ref Rng & Units 8/13/2018           1:33 PM   Sodium      136 - 145 mmol/L    Potassium      3.5 - 5.0 mmol/L    Chloride      98 - 107 mmol/L    CO2      22 - 31 mmol/L    Anion Gap, Calculation      5 - 18 mmol/L    Glucose      70 - 125 mg/dL    BUN      8 - 22 mg/dL    Creatinine      0.70 - 1.30 mg/dL 1.09   GFR MDRD Af Amer      >60 mL/min/1.73m2 >60   GFR MDRD Non Af Amer      >60 mL/min/1.73m2 >60   Bilirubin, Total      0.0 - 1.0 mg/dL    Calcium      8.5 - 10.5 mg/dL 11.8 (H)   Protein, Total      6.0 - 8.0 g/dL    ALBUMIN      3.5 - 5.0 g/dL    Alkaline Phosphatase      45 - 120 U/L    AST      0 - 40 U/L    ALT      0 - 45 U/L    PTH      10 - 86 pg/mL 100 (H)   Phosphorus      2.5 - 4.5 mg/dL 2.3 (L)   Vitamin D, Total (25-Hydroxy)      30.0 - 80.0 ng/mL    1,25 Dihydroxyvitamin D      19.9 - 79.3 pg/mL      NM PARATHYROID PLANAR W SPECT  8/23/2018 12:56 PM     INDICATION: Hyperparathyroidism.  TECHNIQUE: The patient ingested 0.28 mCi I-123, and following an appropriate interval 26.8 mCi Tc99m sestamibi was injected IV. Immediate and delayed Tc planar images were obtained as a washout study. Dual isotope I and Tc planar and SPECT images of the   neck and upper chest were also obtained for tumor localization with computer-generated subtraction.  COMPARISON: None     FINDINGS: Increased, persistent, discordant sestamibi accumulation posterior to the inferior portion of the right thyroid lobe is highly suspicious for a parathyroid adenoma. No other abnormal sestamibi accumulation in the neck or chest.        US THYROID  8/23/2018 1:43 PM     INDICATION: Hyperparathyroidism, unspecified. History of treatment for Graves' disease.  TECHNIQUE: Routine.  COMPARISON: None.     FINDINGS:  RIGHT thyroid lobe measures 2.1 x 1.0 x 0.9  cm. Normal echotexture with no focal nodules requiring follow-up.      LEFT thyroid lobe measures 1.3 x 0.4 x 0.5  cm.  Normal echotexture with no focal nodules requiring follow-up.     Isthmus measures 1  mm. No additional findings.     No cervical lymphadenopathy.     IMPRESSION:   CONCLUSION:  1.  Small size of the thyroid gland, which can be seen with history of treatment for thyroid disease.  2.  No nodules require dedicated follow-up.      Review of Systems:    Nervous System: No headache, dizziness, fainting or memory loss. No tingling sensation of hand or feet.  Ears: No hearing loss or ringing in the ears  Eyes: No blurring of vision, redness, itching or dryness.  Nose: No nosebleed or loss of smell  Mouth: No mouth sores or loss of taste  Throat: No hoarseness or difficulty swallowing  Neck: No enlarged thyroid or lymph nodes.  Heart: No chest pain, palpitation or irregular heartbeat. No swelling of hands or feet  Lungs: No shortness of breath, cough, night sweats, wheezing or hemoptysis.  Gastrointestinal: No nausea or vomiting, constipation or diarrhea.  No acid reflux, abdominal pain or blood in stools.  Kidney/Bladdr: No polyuria, polydipsia, nocturia or hematuria.  Genital/Sexual: No loss of libido  Skin: No rash, hair loss or hirsutism.  No abnormal striae  Muscles/Joints/Bones: No morning stiffness, muscle aches and pain or loss of height.    Current Medications:  Current Outpatient Prescriptions   Medication Sig     esomeprazole magnesium (NEXIUM 24 HR) 22.3 mg capsule Take 22.3 mg by mouth daily as needed.     fluticasone (FLOVENT HFA) 110 mcg/actuation inhaler Inhale 2 puffs 2 (two) times a day as needed (during winter months).     levothyroxine (SYNTHROID, LEVOTHROID) 200 MCG tablet Take 200 mcg by mouth daily.     VENTOLIN HFA 90 mcg/actuation inhaler Inhale two (2) puff(s) every 4 hours as needed     calcium polycarbophil (FIBERCON) 625 mg tablet Take 625 mg by mouth daily.       Patients Active Problems:  Patient Active Problem List   Diagnosis     Multiple Environmental Allergies     Mild intermittent asthma      Pain In The Hands     Hypothyroidism     Graves' Disease     Allergic Rhinitis     Lump In / On The Skin     Malignant Melanoma Of The Back     Dysphagia     History of diverticulitis     Hypercalcemia     Tobacco abuse     Asymptomatic gallstones     Urinary tract stones     Hyperparathyroidism (H)       History:   reports that he has never smoked. He has never used smokeless tobacco. He reports that he does not drink alcohol or use illicit drugs.   reports that he has never smoked. He has never used smokeless tobacco. He reports that he does not drink alcohol or use illicit drugs.  History   Smoking Status     Never Smoker   Smokeless Tobacco     Never Used      reports that he has never smoked. He has never used smokeless tobacco. He reports that he does not drink alcohol or use illicit drugs.  History   Sexual Activity     Sexual activity: Yes     Birth control/ protection: Surgical     Past Medical History:   Diagnosis Date     Acute bronchitis      Allergic rhinitis      Asthma      Graves disease      Hypertension      Kidney stone 11/06/2017    first stone at age 52     Malignant melanoma of skin of trunk, except scrotum (H)     removed from back 2015     Unspecified hypothyroidism      Family History   Problem Relation Age of Onset     Hypertension Mother      Heart disease Mother      pacemaker     Cancer Maternal Aunt      lung     Urolithiasis Maternal Uncle      Lung disease Maternal Uncle      asthma     Past Medical History:   Diagnosis Date     Acute bronchitis      Allergic rhinitis      Asthma      Graves disease      Hypertension      Kidney stone 11/06/2017    first stone at age 52     Malignant melanoma of skin of trunk, except scrotum (H)     removed from back 2015     Unspecified hypothyroidism      Past Surgical History:   Procedure Laterality Date     EYE SURGERY      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     HEMANGIOMA EXCISION      as an infant     [place] BX/REMV,LYMPH NODE,DEEP AXILL Left  6/5/2014    Procedure:  AXILLARY LYMPH NODE DISSECTION-LEFT;  Surgeon: Sergey Glover MD;  Location: [facility];  Service: General     AZ CYSTO/URETERO W/LITHOTRIPSY &INDWELL STENT INSRT Left 11/14/2017    Procedure: CYSTOSCOPY, WITH LEFT URETEROSCOPY,  LASER LITHOTRIPSY STONE EXTRACTION STENT INSERTION;  Surgeon: González Lagos MD;  Location: [facility];  Service: Urology     AZ EXCIS SUPRATENT MENINGIOMA      Description: Craniotomy Supratentorial Excision Of Meningioma;  Recorded: 02/06/2013;  Comments: Excision of frontal facial meningioma at age 6 months (residual ptosis right eye)     SINUS SURGERY      polyposis     SINUS SURGERY  April 2011 - (x2)    polyp removal x2     SKIN BIOPSY       VASECTOMY  12/2011     WRIST SURGERY  2003     wrist surgery, right         Vitals   height is 6' (1.829 m) and weight is 179 lb 12.8 oz (81.6 kg). His blood pressure is 126/80.         Exam  General appearance: The patient looked well, not in acute distress.  Eyes: no evidence of thyroid eye disease.   Retinal exam: No evidence of diabetic retinopathy.  Mouth and Throat: Normal  Neck: No evidence of thyromegaly, enlarged lymph node or tenderness  Chest: Trachea is central. Chest is clear to auscultation and percussion. Breat sounds are normal.  Cardiovascular exam: JVP is not raised. Heart sounds are normal, no murmurs or rub  Peripheral pulses are palpable.   Abdomen: No masses or tenderness.    Back: No vertebral tenderness or kyphosis.  Extremities: No evidence of leg edema.   Skin: Normal to touch.  No abnormal striae  Neurologic exam:  Visual fields are intact by confrontation, grossly intact. No evidence of peripheral neuropathy.  Detailed foot exam normal.        Diagnosis:  No diagnosis found.    Orders:   No orders of the defined types were placed in this encounter.        Assessment and Plan: Primary hyperparathyroidism the patient biochemically has evidence of primary hyperparathyroidism I  did advise him to do 24-hour urinary calcium.    Thyroid ultrasound showed small thyroid because of the radioactive iodine ablation.       sestamibi scan showed enlargement of the right inferior parathyroid gland I discussed the pathophysiology of the disease with the patient in details I will refer him to Dr. Vincent Linn for parathyroidectomy the patient agrees did advise him to do 24-hour urine calcium to stay away from calcium supplements patient will return to clinic in 6 months.    I have reviewed and ordered clinical lab test    I have reviewed and ordered radiology tests.    I have reviewed and ordered her medication as required.    I have reviewed her test results and advised with the performing physician.    I have reviewed the patient's old records.    I have reviewed and summarized the patient old records.    I did spend 60 minutes with the patient more than 50% was spent on counseling and managing her care.

## 2021-06-20 NOTE — ANESTHESIA POSTPROCEDURE EVALUATION
Patient: Omi Grimm  PARATHYROIDECTOMY 23HR  Anesthesia type: general    Patient location: Phase II Recovery  Last vitals:   Vitals:    10/09/18 1640   BP: 137/82   Pulse: 62   Resp: 16   Temp:    SpO2: 94%     Post vital signs: stable  Level of consciousness: awake and responds to simple questions  Post-anesthesia pain: pain controlled  Post-anesthesia nausea and vomiting: no  Pulmonary: unassisted, return to baseline  Cardiovascular: stable and blood pressure at baseline  Hydration: adequate  Anesthetic events: no    QCDR Measures:  ASA# 11 - Kacie-op Cardiac Arrest: ASA11B - Patient did NOT experience unanticipated cardiac arrest  ASA# 12 - Kacie-op Mortality Rate: ASA12B - Patient did NOT die  ASA# 13 - PACU Re-Intubation Rate: ASA13B - Patient did NOT require a new airway mgmt  ASA# 10 - Composite Anes Safety: ASA10A - No serious adverse event    Additional Notes:

## 2021-06-20 NOTE — ANESTHESIA CARE TRANSFER NOTE
Last vitals:   Vitals:    10/09/18 1523   BP: 167/91   Pulse: 80   Resp: 16   Temp: 97.8   SpO2: 99     Patient's level of consciousness is drowsy  Spontaneous respirations: yes  Maintains airway independently: yes  Dentition unchanged: yes  Oropharynx: oropharynx clear of all foreign objects    QCDR Measures:  ASA# 20 - Surgical Safety Checklist: WHO surgical safety checklist completed prior to induction  PQRS# 430 - Adult PONV Prevention: 4558F - Pt received => 2 anti-emetic agents (different classes) preop & intraop  ASA# 8 - Peds PONV Prevention: NA - Not pediatric patient, not GA or 2 or more risk factors NOT present  PQRS# 424 - Kacie-op Temp Management: 4559F - At least one body temp DOCUMENTED => 35.5C or 95.9F within required timeframe  PQRS# 426 - PACU Transfer Protocol: - Transfer of care checklist used  ASA# 14 - Acute Post-op Pain: ASA14B - Patient did NOT experience pain >= 7 out of 10

## 2021-06-20 NOTE — LETTER
Letter by Kane Mann MD at      Author: Kane Mann MD Service: -- Author Type: --    Filed:  Encounter Date: 7/10/2020 Status: (Other)                   Office Hours: Monday - Friday 8:00 - 4:30PM    Omi Grimm  [address] [facility] [address]           July 10, 2020      Dear Omi:    It looks as though you are due to see Dr Mann. If you would like to schedule this please call [phone]         Sincerely,        [facility]

## 2021-06-20 NOTE — PROGRESS NOTES
Preoperative Exam    Scheduled Procedure: PARATHYROIDECTOMY 23HR  Surgery Date: 10/9/18  Surgery Location: [facility], fax [phone]    Surgeon:  Dr. Linn     Assessment/Plan:     1. Encounter for preoperative examination for general surgical procedure    - HM2(CBC w/o Differential)  - Basic Metabolic Panel    2. Hyperparathyroidism (H)  -Proceed with surgery as deemed medically necessary and appropriate by general surgery and anesthesia    3. Hypothyroidism  -updated labs today, f/u in one year  - Thyroid Stimulating Hormone (TSH)  - T4, Free    4. Mild intermittent asthma without complication  -Doing well without complication. F/u in 6 months    5. Need for vaccination  - Tdap vaccine,  6yo or older,  IM  - Influenza, Seasonal,Quad Inj, 36+ MOS    6. Heartburn  -updating labs today  - Magnesium     Surgical Procedure Risk: Intermediate (reported cardiac risk generally 1-5%)  Have you had prior anesthesia?: Yes  Have you or any family members had a previous anesthesia reaction:  No  Do you or any family members have a history of a clotting or bleeding disorder?: No  Cardiac Risk Assessment: no increased risk for major cardiac complications    Patient approved for surgery with general or local anesthesia.      Functional Status: Independent  Patient plans to recover at home with family.     Subjective:      Omi Grimm is a 52 y.o. male who presents for a preoperative consultation.     He has been found to hyperparathyroidism and is going to have one of his parathyroid glands taken out. He has had hypercalcemia which is why this was found.  He has had kidney stones in the past as well.    His breathing has generally been under good control.  He has felt more achy which is now assumed to be secondary to the hyperparathyroidism.  He is doing well on the Nexium and taking this as prescribed.    All other systems reviewed and are negative, other than those listed in the HPI.    Pertinent  History  Do you have difficulty breathing or chest pain after walking up a flight of stairs: No  History of obstructive sleep apnea: No  Steroid use in the last 6 months: No  Frequent Aspirin/NSAID use: No  Prior Blood Transfusion: No  Prior Blood Transfusion Reaction: No  If for some reason prior to, during or after the procedure, if it is medically indicated, would you be willing to have a blood transfusion?:  There is no transfusion refusal.    Current Outpatient Prescriptions   Medication Sig Dispense Refill     calcium polycarbophil (FIBERCON) 625 mg tablet Take 625 mg by mouth daily.       esomeprazole magnesium (NEXIUM 24 HR) 22.3 mg capsule Take 22.3 mg by mouth daily as needed.       fluticasone (FLOVENT HFA) 110 mcg/actuation inhaler Inhale 2 puffs 2 (two) times a day as needed (during winter months). 1 Inhaler 1     levothyroxine (SYNTHROID, LEVOTHROID) 200 MCG tablet Take 200 mcg by mouth daily.       VENTOLIN HFA 90 mcg/actuation inhaler Inhale two (2) puff(s) every 4 hours as needed 18 g 0     No current facility-administered medications for this visit.         Allergies   Allergen Reactions     Penicillins Shortness Of Breath and Swelling     Dilaudid [Hydromorphone] Dizziness       Patient Active Problem List   Diagnosis     Multiple Environmental Allergies     Mild intermittent asthma     Pain In The Hands     Hypothyroidism     Graves' Disease     Allergic Rhinitis     Lump In / On The Skin     Malignant Melanoma Of The Back     Dysphagia     History of diverticulitis     Hypercalcemia     Asymptomatic gallstones     Urinary tract stones     Hyperparathyroidism (H)       Past Medical History:   Diagnosis Date     Acute bronchitis      Allergic rhinitis      Asthma      Graves disease      Hypertension      Kidney stone 11/06/2017    first stone at age 52     Malignant melanoma of skin of trunk, except scrotum (H)     removed from back 2015     Unspecified hypothyroidism        Past Surgical  "History:   Procedure Laterality Date     EYE SURGERY      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     HEMANGIOMA EXCISION      as an infant     DC BX/REMV,LYMPH NODE,DEEP AXILL Left 6/5/2014    Procedure:  AXILLARY LYMPH NODE DISSECTION-LEFT;  Surgeon: Sergey Glover MD;  Location: [facility];  Service: General     DC CYSTO/URETERO W/LITHOTRIPSY &INDWELL STENT INSRT Left 11/14/2017    Procedure: CYSTOSCOPY, WITH LEFT URETEROSCOPY,  LASER LITHOTRIPSY STONE EXTRACTION STENT INSERTION;  Surgeon: González Lagos MD;  Location: [facility];  Service: Urology     DC EXCIS SUPRATENT MENINGIOMA      Description: Craniotomy Supratentorial Excision Of Meningioma;  Recorded: 02/06/2013;  Comments: Excision of frontal facial meningioma at age 6 months (residual ptosis right eye)     SINUS SURGERY      polyposis     SINUS SURGERY  April 2011 - (x2)    polyp removal x2     SKIN BIOPSY       VASECTOMY  12/2011     WRIST SURGERY  2003     wrist surgery, right         Social History     Social History     Marital status:      Spouse name: N/A     Number of children: N/A     Years of education: N/A     Occupational History           Social History Main Topics     Smoking status: Never Smoker     Smokeless tobacco: Never Used     Alcohol use No     Drug use: No     Sexual activity: Yes     Birth control/ protection: Surgical     Other Topics Concern     Not on file     Social History Narrative         Objective:     Vitals:    10/05/18 1401   BP: 124/80   Pulse: 75   Temp: 98.2  F (36.8  C)   TempSrc: Oral   SpO2: 98%   Weight: 180 lb 3.2 oz (81.7 kg)   Height: 5' 11.5\" (1.816 m)         Physical Exam:  Well developed, well nourished, no acute distress.  HEENT: normocephalic/atraumatic, PERRLA/EOMI, TMs: Gray, normal light reflex, no nasal discharge.  Oral mucosa: no erythema/exudate  Neck: No LAD/masses/thyromegaly  Lungs: clear bilaterally  Heart: regular rate and rhythm, no " murmurs/gallops/rubs  Abdomen: Normal bowel sounds, soft, non-tender, non-distended, no masses, neg Burgess's/McBurney's, no rebound/guarding  Lymphatics: no supraclavicular/axillary/epitrochlear/inguinal LAD. No edema.  Neuro: A&O x 3, CN II-XII intact, strength 5/5, reflexes symmetric, sensory intact to light touch.  Psych: Behavior appropriate, engaging.  Thought processes congruent, non-tangential.  Musculoskeletal: no gross deformities.  Skin: no rashes or lesions.       Patient Instructions     Hold all supplements, aspirin and NSAIDs for 7 days prior to surgery.  Follow your surgeon's direction on when to stop eating and drinking prior to surgery.  Your surgeon will be managing your pain after your surgery.    Remove all jewelry and metal piercings before your surgery.   Ok to take the morning of surgery your levothyroxine.   You may take, but can hold the Nexium if feeling well  Please take inhalers like normal.       Labs:  Recent Results (from the past 120 hour(s))   HM2(CBC w/o Differential)    Collection Time: 10/05/18  2:42 PM   Result Value Ref Range    WBC 6.0 4.0 - 11.0 thou/uL    RBC 5.13 4.40 - 6.20 mill/uL    Hemoglobin 15.2 14.0 - 18.0 g/dL    Hematocrit 43.6 40.0 - 54.0 %    MCV 85 80 - 100 fL    MCH 29.7 27.0 - 34.0 pg    MCHC 34.9 32.0 - 36.0 g/dL    RDW 12.2 11.0 - 14.5 %    Platelets 240 140 - 440 thou/uL    MPV 6.8 (L) 7.0 - 10.0 fL   Thyroid Stimulating Hormone (TSH)    Collection Time: 10/05/18  2:42 PM   Result Value Ref Range    TSH 2.75 0.30 - 5.00 uIU/mL   T4, Free    Collection Time: 10/05/18  2:42 PM   Result Value Ref Range    Free T4 1.5 0.7 - 1.8 ng/dL   Basic Metabolic Panel    Collection Time: 10/05/18  2:42 PM   Result Value Ref Range    Sodium 140 136 - 145 mmol/L    Potassium 4.6 3.5 - 5.0 mmol/L    Chloride 104 98 - 107 mmol/L    CO2 25 22 - 31 mmol/L    Anion Gap, Calculation 11 5 - 18 mmol/L    Glucose 93 70 - 125 mg/dL    Calcium 12.0 (H) 8.5 - 10.5 mg/dL    BUN 19 8 - 22  mg/dL    Creatinine 1.02 0.70 - 1.30 mg/dL    GFR MDRD Af Amer >60 >60 mL/min/1.73m2    GFR MDRD Non Af Amer >60 >60 mL/min/1.73m2   Magnesium    Collection Time: 10/05/18  2:42 PM   Result Value Ref Range    Magnesium 2.4 1.8 - 2.6 mg/dL       Immunization History   Administered Date(s) Administered     Influenza [id number], 01/11/2010     Influenza, seasonal,quad inj 36+ mos 10/06/2017     Influenza, seasonal,quad inj 6-35 mos 10/20/2014     Influenza,seasonal quad, PF, 36+MOS 12/24/2015     Tdap 04/28/2008           Electronically signed by Danuta Galicia MD 10/05/18 1:57 PM

## 2021-06-21 NOTE — PROGRESS NOTES
HPI: Omi Grimm is here for follow up of a parathyroidectomy. He is doing well. His appetite is good, and energy level is appropriate. He is swallowing without difficulty. No numbness or tingling.    /74 (Patient Site: Left Arm, Patient Position: Sitting, Cuff Size: Adult Regular)  Pulse 75  Ht 6' (1.829 m)  Wt 179 lb (81.2 kg)  SpO2 100%  BMI 24.28 kg/m2      EXAM: This is a  53 y.o. male in no distress  GENERAL: Appears well  Neck: Incision clean and dry. Voice clear.      Assessment/Plan: Mr. Grimm is following up after parathyroidectomy. Doing well.   Will check ICa. If normal, may wean TUMS and discharge from clinic.   Return if any problems.     Vincent Linn MD  [facility] Department of Surgery

## 2021-06-21 NOTE — PROGRESS NOTES
ASSESSMENT/PLAN:       1. Abdominal pain, right lower quadrant    - HM1(CBC and Differential)  - Erythrocyte Sedimentation Rate  - sulfamethoxazole-trimethoprim (SEPTRA DS) 800-160 mg per tablet; Take 1 tablet by mouth 2 (two) times a day for 10 days.  Dispense: 20 tablet; Refill: 0  - metroNIDAZOLE (FLAGYL) 500 MG tablet; Take 1 tablet (500 mg total) by mouth 3 (three) times a day for 10 days.  Dispense: 30 tablet; Refill: 0  - HM1 (CBC with Diff)  Encourage the patient to drink good amounts of water.  Inform him of his test results by my chart.  If he is getting worse particularly worsening pain, fever or blood per rectum then he needs to be seen again.  Discussed the potential side effects from the Flagyl and the Bactrim double strength.  If he develops a rash he needs to stop his antibiotics and be seen again.  He may get a metallic taste in his mouth from the metronidazole.    2. Mild intermittent asthma without complication  ACT score is satisfactory and the patient will continue on albuterol rescue inhaler as needed    3. Pain in shoulder region, left  This seems like a rotator cuff impingement with some likely inflammation in the rotator cuff tendon.  Recommended use of pendulum exercises twice a day  Recommend use of heat early in the day and ice at the end of the day  Okay to use ibuprofen 600 mg 3 times a day if needed.  If not improved over the next 2 weeks he should return for further evaluation which would need to include some imaging.            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 Abdominal pain, right lower quadrant  2-day history of lower abdominal pain which he feels is very similar to what he has experienced in the  past when he had diverticulitis.  He has some obstipation but when he has a bowel movement there is no blood or mucus in the stool.  The pain does not radiate into his back.  He has normal urination without dysuria or frequency.  He has not noticed any fever chills or night sweats.  He notices that with eating it seems to make the pain worse.  The patient had a colonoscopy and May 2016 where he was noted to have a couple hyperplastic polyps.  Also a colonoscopy he had pan diverticulosis with no significant inflammatory component.  The last time the patient was treated for diverticulitis was December 2017 and he was treated with Bactrim double strength and Flagyl at that time.  He has had a history of kidney stones but this pain seems different than that.  He is also had a history of malignant melanoma.  His follow-up studies for his melanoma have been negative.    2. Mild intermittent asthma without complication  The patient uses his Ventolin inhaler about once a week and he feels that his asthma is well controlled.    3. Pain in shoulder region, left  3-day history of left shoulder pain and he noticed pain one morning just when he woke up and thought he just slept on it funny but it is only improved slightly since then.  He is right-hand dominant and prior to the pain had not been doing any physical activity as he still has been recovering from parathyroid surgery.  He did go back to work yesterday.  He does not have problems with his left shoulder in the past.  It does not bother him very much when he sleeps at night.  Certain movements seem to hurt and he did take some ibuprofen 600 mg last night which seemed to have helped.  Patient Active Problem List   Diagnosis     Multiple Environmental Allergies     Mild intermittent asthma     Pain In The Hands     Hypothyroidism     Graves' Disease     Allergic Rhinitis     Lump In / On The Skin     Malignant Melanoma Of The Back     Dysphagia     History of  diverticulitis     Hypercalcemia     Asymptomatic gallstones     Urinary tract stones     Hyperparathyroidism (H)       Current Outpatient Prescriptions   Medication Sig Dispense Refill     calcium, as carbonate, (TUMS) 200 mg calcium (500 mg) chewable tablet Chew 2 tablets (400 mg total) 2 (two) times a day. 60 tablet 1     esomeprazole magnesium (NEXIUM 24 HR) 22.3 mg capsule Take 22.3 mg by mouth daily as needed.       levothyroxine (SYNTHROID, LEVOTHROID) 200 MCG tablet Take 200 mcg by mouth daily.       VENTOLIN HFA 90 mcg/actuation inhaler Inhale two (2) puff(s) every 4 hours as needed 18 g 0     calcium polycarbophil (FIBERCON) 625 mg tablet Take 625 mg by mouth daily.       metroNIDAZOLE (FLAGYL) 500 MG tablet Take 1 tablet (500 mg total) by mouth 3 (three) times a day for 10 days. 30 tablet 0     sulfamethoxazole-trimethoprim (SEPTRA DS) 800-160 mg per tablet Take 1 tablet by mouth 2 (two) times a day for 10 days. 20 tablet 0     No current facility-administered medications for this visit.        History   Smoking Status     Never Smoker   Smokeless Tobacco     Never Used           OBJECTIVE:        Recent Results (from the past 240 hour(s))   Parathyroid Hormone, Intraoperative   Result Value Ref Range     (H) 10 - 86 pg/mL   Parathyroid Hormone, Intraoperative   Result Value Ref Range    PTH 24 10 - 86 pg/mL       Vitals:    10/17/18 0817   BP: 108/82   Patient Position: Sitting   Cuff Size: Adult Regular   Pulse: 88   SpO2: 100%   Weight: 179 lb 1 oz (81.2 kg)     Weight: 179 lb 1 oz (81.2 kg)        Physical Exam:  GENERAL APPEARANCE: 52-year-old male, complaining of left shoulder and abdominal pain, well hydrated, well nourished  SKIN:  Normal skin turgor, no lesions/rashes   HEENT: moist mucous membranes, no rhinorrhea  NECK: Normal without adenopathy or masses  CV: RRR, no M/G/R   LUNGS: CTAB  ABDOMEN: Soft flat tender in the lower abdomen particularly the suprapubic area in the right lower  quadrant with some mild guarding but no rebound tenderness.  No masses or enlarged organs.  Bowel sounds were active.    EXTREMITY: Left shoulder inspection Normal, tender over the anterior left shoulder to palpation.  Also some tenderness in the levator scapular muscle on the left side.  Range of motion of the left shoulder forward elevation 120 degrees and abduction 90 degrees with normal internal and external rotation of the left shoulder.  The patient has some weakness in the left shoulder with testing of the infraspinatus muscle.  He also has some impingement with positive Coats test.  NEURO: no focal findings

## 2021-06-23 NOTE — PROGRESS NOTES
Patient here today for labs and 6 month follow-up visit with Dr. Mann for melanoma/AUSTIN Langston RN

## 2021-06-23 NOTE — PROGRESS NOTES
[facility] Hematology and Oncology Progress Note    Patient: Omi Grimm  MRN: [id number]  Date of Service: February 11, 2019      Assessment and Plan:    1.  Melanoma: His imaging was reviewed.  Remains normal.  No suspicious areas are imaging findings suggesting recurrence.  He will make an appointment with dermatology in the upcoming weeks.  He follows with them yearly now.  We will see him back in 6 months with imaging.  Consider switching to one year follow-up as he will be 5 years out from surgery at that time.    2.  Hypercalcemia: Resolved now that he has had a parathyroid adenoma removed.    ECOG Performance   ECOG Performance Status: 00    Distress Assessment  Distress Assessment Score: No distress0    Pain  Currently in Pain: No/denies    Diagnosis:    Melanoma involving the back: Gee level IV. 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 Stage [id number]. IIC. Diagnosed June, 2014.    Treatment:    Wide excision and sentinel node biopsy in June 5, 2014. No adjuvant therapy was given.    Interim History:    Omi returns for follow-up visit.  He comes in for a 6 month follow-up visit.  Overall doing well.  No pain.  No headaches.  No cough or shortness of breath.  Energy and appetite are good.    Review of Systems:    Constitutional  Constitutional (WDL): All constitutional elements are within defined limits  Neurosensory  Neurosensory (WDL): All neurosensory elements are within defined limits  Cardiovascular  Cardiovascular (WDL): All cardiovascular elements are within defined limits  Pulmonary  Respiratory (WDL): Exceptions to WDL  Cough: Mild symptoms, nonprescription intervention indicated(occ dry)  Dyspnea: None  Hypoxia: None  Gastrointestinal  Gastrointestinal (WDL): All gastrointestinal elements are within defined limits  Genitourinary  Genitourinary (WDL): All genitourinary elements are within defined limits  Integumentary  Integumentary (WDL): All integumentary elements are within defined limits  Patient  Coping  Patient Coping: Accepting  Accompanied by  Accompanied by: Family Member    Past History:    Past Medical History:   Diagnosis Date     Acute bronchitis      Allergic rhinitis      Asthma      Graves disease      Hypertension      Kidney stone 11/06/2017    first stone at age 52     Malignant melanoma of skin of trunk, except scrotum (H)     removed from back 2015     Unspecified hypothyroidism      Physical Exam:    Recent Vitals 2/11/2019   Height -   Weight 184 lbs 10 oz   BSA (m2) 2.06 m2   /87   Pulse 75   Temp 98.1   Temp src 1   SpO2 96   Some recent data might be hidden     General: patient appears stated age of 53 y.o.. Nontoxic and in no distress.   HEENT: Head: atraumatic, normocephalic. Sclerae anicteric.  Chest:  Normal respiratory effort  Cardiac:  No edema.   Abdomen: abdomen is non-distended  Extremities: normal tone and muscle bulk.  Skin: no lesions or rash. Warm and dry.   CNS: alert and oriented.  Psychiatric: normal mood and affect.     Lab Results:    Recent Results (from the past 168 hour(s))   Comprehensive Metabolic Panel   Result Value Ref Range    Sodium 140 136 - 145 mmol/L    Potassium 4.0 3.5 - 5.0 mmol/L    Chloride 103 98 - 107 mmol/L    CO2 27 22 - 31 mmol/L    Anion Gap, Calculation 10 5 - 18 mmol/L    Glucose 110 70 - 125 mg/dL    BUN 17 8 - 22 mg/dL    Creatinine 1.07 0.70 - 1.30 mg/dL    GFR MDRD Af Amer >60 >60 mL/min/1.73m2    GFR MDRD Non Af Amer >60 >60 mL/min/1.73m2    Bilirubin, Total 0.5 0.0 - 1.0 mg/dL    Calcium 9.7 8.5 - 10.5 mg/dL    Protein, Total 7.7 6.0 - 8.0 g/dL    Albumin 4.0 3.5 - 5.0 g/dL    Alkaline Phosphatase 77 45 - 120 U/L    AST 19 0 - 40 U/L    ALT 19 0 - 45 U/L   HM1 (CBC with Diff)   Result Value Ref Range    WBC 4.9 4.0 - 11.0 thou/uL    RBC 5.05 4.40 - 6.20 mill/uL    Hemoglobin 14.6 14.0 - 18.0 g/dL    Hematocrit 43.5 40.0 - 54.0 %    MCV 86 80 - 100 fL    MCH 28.9 27.0 - 34.0 pg    MCHC 33.6 32.0 - 36.0 g/dL    RDW 13.1 11.0 - 14.5 %     Platelets 207 140 - 440 thou/uL    MPV 8.6 8.5 - 12.5 fL    Neutrophils % 63 50 - 70 %    Lymphocytes % 28 20 - 40 %    Monocytes % 6 2 - 10 %    Eosinophils % 4 0 - 6 %    Basophils % 0 0 - 2 %    Neutrophils Absolute 3.1 2.0 - 7.7 thou/uL    Lymphocytes Absolute 1.4 0.8 - 4.4 thou/uL    Monocytes Absolute 0.3 0.0 - 0.9 thou/uL    Eosinophils Absolute 0.2 0.0 - 0.4 thou/uL    Basophils Absolute 0.0 0.0 - 0.2 thou/uL     Imaging:    I personally reviewed his CT scan images.  No evidence of recurrent disease.    Ct Chest Abdomen Pelvis Without Oral With Iv Contrast    Result Date: 2/4/2019  [facility] RADIOLOGY EXAM: CT CHEST ABDOMEN PELVIS WO ORAL W IV CONTRAST LOCATION: [facility] DATE/TIME: 2/4/2019 8:34 AM INDICATION: Melanoma f/u. COMPARISON: CT scan of the chest, abdomen and pelvis, 8/2/2018. TECHNIQUE: Helical thin-section CT scan of the chest, abdomen, and pelvis was performed following injection of IV contrast. Multiplanar reformats were obtained. Dose reduction techniques were used. CONTRAST: Iohexol (Omni) 100 mL FINDINGS: CHEST: There are scattered tiny calcified pulmonary nodules which measure 2 mm or less in size. For instance, a 2 mm calcified nodule on image 59 of series 6.2 is seen within the right lower lobe. The calcified nodule within the left upper lobe in image 31 of series 6.2 is also present. These are unchanged. The lungs and pleural spaces are otherwise clear. No pneumothorax. Heart size is normal. The thoracic aorta is normal in caliber.  ABDOMEN: The liver is low-dense in appearance, consistent with hepatic steatosis. A stone is present within the gallbladder. The adrenal glands, kidneys, spleen, and pancreas are grossly normal. The abdominal aorta is normal in caliber. No lymphadenopathy. There is a tiny hiatal hernia. No bowel obstruction or abnormal bowel wall thickening. There is diffuse clonic diverticulosis without diverticulitis. PELVIS: The urinary bladder is grossly  normal. No pelvic free fluid or lymphadenopathy. The appendix is normal. MUSCULOSKELETAL: Negative.     CONCLUSION: 1.  No evidence of metastatic disease involving the chest, abdomen or pelvis. 2.  Cholelithiasis. 3.  Hepatic steatosis. 4.  Tiny hiatal hernia.      Signed by: Kane Mann MD

## 2021-06-24 ENCOUNTER — RECORDS - HEALTHEAST (OUTPATIENT)
Dept: SCHEDULING | Facility: CLINIC | Age: 56
End: 2021-06-24

## 2021-06-24 DIAGNOSIS — E03.9 HYPOTHYROIDISM, UNSPECIFIED TYPE: ICD-10-CM

## 2021-06-24 RX ORDER — LEVOTHYROXINE SODIUM 200 UG/1
TABLET ORAL
Qty: 40 TABLET | Refills: 1 | Status: SHIPPED | OUTPATIENT
Start: 2021-06-24 | End: 2022-02-01

## 2021-06-24 RX ORDER — LEVOTHYROXINE SODIUM 175 UG/1
TABLET ORAL
Qty: 48 TABLET | Refills: 0 | Status: SHIPPED | OUTPATIENT
Start: 2021-06-24 | End: 2022-02-01 | Stop reason: DRUGHIGH

## 2021-06-25 ENCOUNTER — COMMUNICATION - HEALTHEAST (OUTPATIENT)
Dept: FAMILY MEDICINE | Facility: CLINIC | Age: 56
End: 2021-06-25

## 2021-06-25 NOTE — TELEPHONE ENCOUNTER
----- Message from Roger Clay MD sent at 3/14/2019  8:09 PM CDT -----  Please take synthroid 200 mcg on MWF the rest of the days take 175 mcg.

## 2021-06-26 NOTE — PROGRESS NOTES
Progress Notes by Vincent Linn MD at 9/18/2018  9:30 AM     Author: Vincent Linn MD Service: -- Author Type: Physician    Filed: 9/18/2018 10:52 AM Encounter Date: 9/18/2018 Status: Signed    : Vincent Linn MD (Physician)       HPI: I am consulted by Danuta Galicia MD and Roger Clay MD in this 52 y.o. male regarding hyperparathyroidism. He has been  asymptomatic. He has been known to have elevated calcium for about 5 months.  He has not had bony aches, abdominal discomfort, osteoporosis and bony fractures.  He did have kidney stones treated in November 2017.    He does not have fatigue, foggy thinking and depression.  He notes his memory is poor, and often has to ask people to repeat tasks that have been given to him.  He has had an ultrasound and a parathyroid scan. The abnormal gland is suspected to be the right inferior parathyroid on sestamibi scanning, but the gland was not localized on ultrasound.    Allergies   Allergen Reactions   ? Penicillins Shortness Of Breath and Swelling   ? Dilaudid [Hydromorphone] Dizziness         Current Outpatient Prescriptions:   ?  esomeprazole magnesium (NEXIUM 24 HR) 22.3 mg capsule, Take 22.3 mg by mouth daily as needed., Disp: , Rfl:   ?  levothyroxine (SYNTHROID, LEVOTHROID) 200 MCG tablet, Take 200 mcg by mouth daily., Disp: , Rfl:   ?  calcium polycarbophil (FIBERCON) 625 mg tablet, Take 625 mg by mouth daily., Disp: , Rfl:   ?  fluticasone (FLOVENT HFA) 110 mcg/actuation inhaler, Inhale 2 puffs 2 (two) times a day as needed (during winter months)., Disp: 1 Inhaler, Rfl: 1  ?  VENTOLIN HFA 90 mcg/actuation inhaler, Inhale two (2) puff(s) every 4 hours as needed, Disp: 18 g, Rfl: 0    Past Medical History:   Diagnosis Date   ? Acute bronchitis    ? Allergic rhinitis    ? Asthma    ? Graves disease    ? Hypertension    ? Kidney stone 11/06/2017    first stone at age 52   ? Malignant melanoma of skin of trunk, except scrotum (H)     removed  from back 2015   ? Unspecified hypothyroidism        Past Surgical History:   Procedure Laterality Date   ? EYE SURGERY      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   ? HEMANGIOMA EXCISION      as an infant   ? NV BX/REMV,LYMPH NODE,DEEP AXILL Left 6/5/2014    Procedure:  AXILLARY LYMPH NODE DISSECTION-LEFT;  Surgeon: Sergey Glover MD;  Location: [facility];  Service: General   ? NV CYSTO/URETERO W/LITHOTRIPSY &INDWELL STENT INSRT Left 11/14/2017    Procedure: CYSTOSCOPY, WITH LEFT URETEROSCOPY,  LASER LITHOTRIPSY STONE EXTRACTION STENT INSERTION;  Surgeon: González Lagos MD;  Location: [facility];  Service: Urology   ? NV EXCIS SUPRATENT MENINGIOMA      Description: Craniotomy Supratentorial Excision Of Meningioma;  Recorded: 02/06/2013;  Comments: Excision of frontal facial meningioma at age 6 months (residual ptosis right eye)   ? SINUS SURGERY      polyposis   ? SINUS SURGERY  April 2011 - (x2)    polyp removal x2   ? SKIN BIOPSY     ? VASECTOMY  12/2011   ? WRIST SURGERY  2003   ? wrist surgery, right         Social History     Social History   ? Marital status:      Spouse name: N/A   ? Number of children: N/A   ? Years of education: N/A     Occupational History   ?       Social History Main Topics   ? Smoking status: Never Smoker   ? Smokeless tobacco: Never Used   ? Alcohol use No   ? Drug use: No   ? Sexual activity: Yes     Birth control/ protection: Surgical     Other Topics Concern   ? Not on file     Social History Narrative       Review of Systems - Negative except as noted in the HPI.  He has had chemotherapy for melanoma.    BP (!) 144/98 (Patient Site: Right Arm, Patient Position: Sitting, Cuff Size: Adult Large)  Pulse 67  Ht 6' (1.829 m)  Wt 178 lb 3.2 oz (80.8 kg)  SpO2 98%  BMI 24.17 kg/m2  Body mass index is 24.17 kg/(m^2).    EXAM:  GENERAL: This is a well developed male in no distress.  SKIN: Grossly intact  EYES: No icterus  CARDIAC: RRR w/out  murmur  CHEST/LUNG: Clear  NECK: No masses or cervical adenopathy.   NEURO: Alert and oriented  AFFECT: Pleasant    LABS:      Ref Range & Units   8/13/18  1:33 PM 8/13/18  1:33 PM   8/13/18  1:33 PM   8/13/18  1:33 PM   8/6/18  1:02 PM    PTH 10 - 86 pg/mL 100 (H)    110 (H)    Calcium 8.5 - 10.5 mg/dL 11.8 (H)  11.9 (H)       Creatinine normal.      IMAGES:   Us Thyroid    Result Date: 8/23/2018   US THYROID 8/23/2018 1:43 PM INDICATION: Hyperparathyroidism, unspecified. History of treatment for Graves' disease. TECHNIQUE: Routine. COMPARISON: None. FINDINGS: RIGHT thyroid lobe measures 2.1 x 1.0 x 0.9  cm. Normal echotexture with no focal nodules requiring follow-up.   LEFT thyroid lobe measures 1.3 x 0.4 x 0.5  cm. Normal echotexture with no focal nodules requiring follow-up. Isthmus measures 1  mm. No additional findings. No cervical lymphadenopathy.     CONCLUSION: 1.  Small size of the thyroid gland, which can be seen with history of treatment for thyroid disease. 2.  No nodules require dedicated follow-up.    Nm Parathyroid Planar W Spect    Result Date: 8/23/2018  NM PARATHYROID PLANAR W SPECT 8/23/2018 12:56 PM INDICATION: Hyperparathyroidism. TECHNIQUE: The patient ingested 0.28 mCi I-123, and following an appropriate interval 26.8 mCi Tc99m sestamibi was injected IV. Immediate and delayed Tc planar images were obtained as a washout study. Dual isotope I and Tc planar and SPECT images of the neck and upper chest were also obtained for tumor localization with computer-generated subtraction. COMPARISON: None FINDINGS: Increased, persistent, discordant sestamibi accumulation posterior to the inferior portion of the right thyroid lobe is highly suspicious for a parathyroid adenoma. No other abnormal sestamibi accumulation in the neck or chest.       Assessment/Plan: Mr. Grimm has hyperparathyroidism.  The abnormal gland  is suspected to be the right inferior parathyroid nuclear medicine scanning, but has  not been localized on ultrasound.  We discussed parathyroidectomy. Risks addressed included death, bleeding and infection, but I specifically concentrated on recurrent laryngeal nerve injury and hypoparathyroidism.   If we only explore one side, we will discharge him on the same day. If we explore both sides, he will stay overnight. We will probably send him home with calcium supplements (TUMS) for the first two weeks, and recheck the calcium in our office at that time.  He understood the discussion, and wishes to proceed with surgery. We will set up the surgery.    Vincent Linn MD  [facility] Department of Surgery

## 2021-06-26 NOTE — TELEPHONE ENCOUNTER
RN cannot approve Refill Request    RN can NOT refill this medication PCP messaged that patient is overdue for Office Visit. Last office visit: 6/24/2019 Danuta Galicia MD Last Physical: 10/5/2018 Last MTM visit: Visit date not found Last visit same specialty: Visit date not found.  Next visit within 3 mo: Visit date not found  Next physical within 3 mo: Visit date not found      Brandy Stratton, [facility] Triage/Med Refill 6/24/2021    Requested Prescriptions   Pending Prescriptions Disp Refills     levothyroxine (SYNTHROID, LEVOTHROID) 200 MCG tablet [Pharmacy Med Name: LEVOTHYROXIN 200MCG] 40 tablet 1     Sig: TAKE 1 TABLET ON MONDAY, WEDNESDAY AND FRIDAY.       Thyroid Hormones Protocol Failed - 6/24/2021 10:48 AM        Failed - Provider visit in past 12 months or next 3 months     Last office visit with prescriber/PCP: 6/24/2019 Danuta Galicia MD OR same dept: Visit date not found OR same specialty: Visit date not found  Last physical: 10/5/2018 Last MTM visit: Visit date not found   Next visit within 3 mo: Visit date not found  Next physical within 3 mo: Visit date not found  Prescriber OR PCP: Danuta Galicia MD  Last diagnosis associated with med order: 1. Hypothyroidism, unspecified type  - levothyroxine (SYNTHROID, LEVOTHROID) 200 MCG tablet [Pharmacy Med Name: LEVOTHYROXIN 200MCG]; TAKE 1 TABLET ON MONDAY, WEDNESDAY AND FRIDAY.  Dispense: 40 tablet; Refill: 1  - levothyroxine (SYNTHROID, LEVOTHROID) 175 MCG tablet [Pharmacy Med Name: LEVOTHYROXIN 175MCG]; TAKE 1 TABLET ON TUES, THURS, SATURDAY AND SUNDAY.  Dispense: 48 tablet; Refill: 0    If protocol passes may refill for 12 months if within 3 months of last provider visit (or a total of 15 months).             Failed - TSH on file in past 12 months for patient age 12 & older     TSH   Date Value Ref Range Status   02/10/2020 5.78 (H) 0.30 - 5.00 uIU/mL Final                      levothyroxine (SYNTHROID,  LEVOTHROID) 175 MCG tablet [Pharmacy Med Name: LEVOTHYROXIN 175MCG] 48 tablet 0     Sig: TAKE 1 TABLET ON TUES, THURS, SATURDAY AND SUNDAY.       Thyroid Hormones Protocol Failed - 6/24/2021 10:48 AM        Failed - Provider visit in past 12 months or next 3 months     Last office visit with prescriber/PCP: 6/24/2019 Danuta Galicia MD OR same dept: Visit date not found OR same specialty: Visit date not found  Last physical: 10/5/2018 Last MTM visit: Visit date not found   Next visit within 3 mo: Visit date not found  Next physical within 3 mo: Visit date not found  Prescriber OR PCP: Danuta Galicia MD  Last diagnosis associated with med order: 1. Hypothyroidism, unspecified type  - levothyroxine (SYNTHROID, LEVOTHROID) 200 MCG tablet [Pharmacy Med Name: LEVOTHYROXIN 200MCG]; TAKE 1 TABLET ON MONDAY, WEDNESDAY AND FRIDAY.  Dispense: 40 tablet; Refill: 1  - levothyroxine (SYNTHROID, LEVOTHROID) 175 MCG tablet [Pharmacy Med Name: LEVOTHYROXIN 175MCG]; TAKE 1 TABLET ON TUES, THURS, SATURDAY AND SUNDAY.  Dispense: 48 tablet; Refill: 0    If protocol passes may refill for 12 months if within 3 months of last provider visit (or a total of 15 months).             Failed - TSH on file in past 12 months for patient age 12 & older     TSH   Date Value Ref Range Status   02/10/2020 5.78 (H) 0.30 - 5.00 uIU/mL Final

## 2021-06-27 NOTE — PROGRESS NOTES
Progress Notes by Myesha Wilder PA-C at 12/7/2018  3:40 PM     Author: Myesha Wilder PA-C Service: -- Author Type: Physician Assistant    Filed: 12/7/2018  4:48 PM Encounter Date: 12/7/2018 Status: Signed    : Myesha Wilder PA-C (Physician Assistant)       Chief Complaint   Patient presents with   ? possible rib injury     x 2 hours ago,  left side, pain =5        HPI:  Omi Grimm is a 53 y.o. male who presents today complaining of left-sided rib pain after experiencing a fall about 2 hours ago.  Patient was collapsing cardboard boxes into a dumpster when his foot slipped out from underneath him in the left side of his rib cage landed on the side of the dumpster.  He denies any head injury.  His rib pain is left sided along the axillary line.  He reports that his pain is a 2-3/10 without any movement, but with deep breaths or arm movement it increases to a 5-6/10.  He has not taken any pain medication for his symptoms.  No shortness of breath.  His daughter helps him examined the area for any wounds.  She noticed slight redness, but no obvious deformity or bruising.          History obtained from the patient.    Problem List:  2018-08: Hyperparathyroidism (H)  2017-07: Asymptomatic gallstones  2017-07: Urinary tract stones  2016-02: History of diverticulitis  Multiple Environmental Allergies  Acute bronchitis  Mild intermittent asthma  Pain In The Hands  Hypothyroidism  Graves' Disease  Allergic Rhinitis  Lump In / On The Skin  Malignant Melanoma Of The Back  Dysphagia  Hypertension  Hypercalcemia  Calculus of ureter      Past Medical History:   Diagnosis Date   ? Acute bronchitis    ? Allergic rhinitis    ? Asthma    ? Graves disease    ? Hypertension    ? Kidney stone 11/06/2017    first stone at age 52   ? Malignant melanoma of skin of trunk, except scrotum (H)     removed from back 2015   ? Unspecified hypothyroidism        Social History     Tobacco Use   ? Smoking status: Never Smoker   ?  Smokeless tobacco: Never Used   Substance Use Topics   ? Alcohol use: No       Review of Systems   Constitutional: Negative for fever.   Respiratory: Positive for cough (mild x 1 day). Negative for shortness of breath.    Cardiovascular: Negative for chest pain.   Hematological: Does not bruise/bleed easily.   All other systems reviewed and are negative.      Vitals:    12/07/18 1544   BP: 125/70   Pulse: 89   Resp: 17   Temp: 98.2  F (36.8  C)   SpO2: 98%   Weight: 181 lb (82.1 kg)       Physical Exam   Constitutional: He appears well-developed and well-nourished. No distress.   Patient is sitting in no apparent distress.  He is wearing a sling on his left arm.  When asked if he was having any shoulder pain he denied any, but stated that keeping his arm in sling help with the rib pain.   HENT:   Head: Normocephalic and atraumatic.   Right Ear: External ear normal.   Left Ear: External ear normal.   Eyes: Conjunctivae are normal. Right eye exhibits no discharge. Left eye exhibits no discharge.   Cardiovascular: Normal rate, regular rhythm and normal heart sounds.   Pulmonary/Chest: Effort normal and breath sounds normal. No respiratory distress. He exhibits tenderness.   See diagram for area of tenderness       Skin: He is not diaphoretic.   Psychiatric: He has a normal mood and affect. His behavior is normal. Judgment and thought content normal.         Radiology:  I have personally ordered and preliminarily reviewed the following xray, I have noted no signs of rib fracture, atelectasis, or lung injury.  Xr Ribs Left W Pa Chest    Result Date: 12/7/2018  XR RIBS LEFT W PA CHEST 12/7/2018 4:16 PM INDICATION: Fall 2 hours ago. r/o left rib fx. pain on axillary line COMPARISON: None. FINDINGS: The visualized heart and lungs are negative. No rib fractures.      Clinical Decision Making:  Wrist and chest x-ray were negative for any signs of fracture.  Suspect soft tissue injury.  Mechanical fall, low suspicion for any  cardiac or other causes of fall.  Patient was reassured.  Instructed on supportive cares and application prevention strategies.  At the end of the encounter, I discussed results, diagnosis, medications. Discussed red flags for immediate return to clinic/ER, as well as indications for follow up if no improvement. Patient understood and agreed to plan. Patient was stable for discharge.    1. Rib pain on left side  XR Ribs Left W PA Chest    CANCELED: XR Chest 1 View         Patient Instructions   1. Your lung and ribs are looking normal. I suspect bruising and soft tissue injury.   2. Recommend icing, keeping your arm in the sling, and Tylenol and Ibuprofen for pain control. Immobilize you arm at least twice per day.   3. Although I know it is painful, make an effort to expand your lungs for a few breaths every 10-15 minutes. This will help prevent lung collapse.   4. Follow up if your pain is not improving after 7-10 days or if you develop shortness of breath.

## 2021-06-28 NOTE — PROGRESS NOTES
Progress Notes by Sergey Navarrete PA-C at 12/14/2019 10:40 AM     Author: Sergey Navarrete PA-C Service: -- Author Type: Physician Assistant    Filed: 12/14/2019 12:03 PM Encounter Date: 12/14/2019 Status: Signed    : Sergey Navarrete PA-C (Physician Assistant)       Subjective:      Patient ID: Omi Grimm is a 54 y.o. male.    Chief Complaint:    HPI     Omi Grimm is a 54 y.o. male who presents today complaining of five day history of acute onset facial frontal and maxillary pain and pressure that is radiating to the teeth and to the jaw.  Patient has a headache that is made worse with dependency.  Postnasal drainage.  Denies fever chills night sweats epistaxis or other constitutional symptoms.  Has not tried any treatment for this at home.  He is a non-smoker.  He has not had vomiting diarrhea skin rash abdominal pain or urinary symptoms.  He has tried over-the-counter NyQuil without relief.    Patient does not have a history of high blood pressure.  He is not having headache chest arm or jaw pain pressure syncope presyncope diaphoresis shortness of breath dyspnea on exertion nausea or vomiting.    Past Medical History:   Diagnosis Date   ? Acute bronchitis    ? Allergic rhinitis    ? Asthma    ? Graves disease    ? Hypertension    ? Kidney stone 11/06/2017    first stone at age 52   ? Malignant melanoma of skin of trunk, except scrotum (H)     removed from back 2015   ? Unspecified hypothyroidism        Past Surgical History:   Procedure Laterality Date   ? EYE SURGERY      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   ? HEMANGIOMA EXCISION      as an infant   ? DE BX/REMV,LYMPH NODE,DEEP AXILL Left 6/5/2014    Procedure:  AXILLARY LYMPH NODE DISSECTION-LEFT;  Surgeon: Sergey Glover MD;  Location: [facility] OR;  Service: General   ? DE CYSTO/URETERO W/LITHOTRIPSY &INDWELL STENT INSRT Left 11/14/2017    Procedure: CYSTOSCOPY, WITH LEFT URETEROSCOPY,  LASER LITHOTRIPSY STONE EXTRACTION STENT  INSERTION;  Surgeon: González Lagos MD;  Location: [facility];  Service: Urology   ? [place] EXCIS SUPRATENT MENINGIOMA      Description: Craniotomy Supratentorial Excision Of Meningioma;  Recorded: 02/06/2013;  Comments: Excision of frontal facial meningioma at age 6 months (residual ptosis right eye)   ? SINUS SURGERY      polyposis   ? SINUS SURGERY  April 2011 - (x2)    polyp removal x2   ? SKIN BIOPSY     ? THYROIDECTOMY, PARTIAL N/A 10/9/2018    Procedure: PARATHYROIDECTOMY 23HR;  Surgeon: Vincent Linn MD;  Location: [facility];  Service:    ? VASECTOMY  12/2011   ? WRIST SURGERY  2003   ? wrist surgery, right         Family History   Problem Relation Age of Onset   ? Hypertension Mother    ? Heart disease Mother         pacemaker   ? Cancer Maternal Aunt         lung   ? Urolithiasis Maternal Uncle    ? Lung disease Maternal Uncle         asthma       Social History     Tobacco Use   ? Smoking status: Never Smoker   ? Smokeless tobacco: Never Used   Substance Use Topics   ? Alcohol use: No   ? Drug use: No       Review of Systems  As above in HPI, otherwise balance of Review of Systems are negative.    Objective:     BP (!) 156/102 (Patient Site: Right Arm, Patient Position: Sitting, Cuff Size: Adult Regular)   Pulse 93   Temp 98.4  F (36.9  C) (Oral)   Wt 183 lb 9.6 oz (83.3 kg)   SpO2 99%   BMI 24.90 kg/m      Physical Exam   General: Patient is resting comfortably no acute distress is afebrile  HEENT: Head is normocephalic atraumatic   Frontal and maxillary sinuses are tender to percussion  eyes are PERRL   EOMI sclera anicteric   TMs are clear bilaterally  Throat is with mild posterior pharyngeal wall exudate but no erythema  No cervical lymphadenopathy present  Lungs: Clear to auscultation bilaterally  Heart: Regular rate and rhythm  Skin: Without rash non-diaphoretic      Assessment:     Procedures    The primary encounter diagnosis was Acute non-recurrent maxillary sinusitis. A  diagnosis of Elevated blood pressure reading without diagnosis of hypertension was also pertinent to this visit.    Plan:     1. Acute non-recurrent maxillary sinusitis  fluticasone propionate (FLONASE ALLERGY RELIEF) 50 mcg/actuation nasal spray    doxycycline (MONODOX) 100 MG capsule   2. Elevated blood pressure reading without diagnosis of hypertension         Had a conversation with patient stating that we will treat him for his sinusitis type symptoms.  He will follow-up with his primary care provider for reevaluation of elevated blood pressure to reconfirm with another reading and start treatment if necessary.  If he becomes symptomatic he can follow-up with the ER.  Indication for return to the ER was gone over questions were answered to patient's satisfaction before discharge.    Patient Instructions     Over-the-counter nasal steroid spray, follow packaging directions  Over-the-counter Mucinex, follow packaging directions  Hot packs 3 times per day to the forehead and face over the tender sinuses  Distal saline salt water or saline irrigation with Laconia Gandara  Antibiotic as written below.  Risks and benefits of the medication were gone over.  Indication for return to see urgent care or family practice provider for reevaluation and treatment.    Patient to follow up with Primary Care provider regarding elevated blood pressure.      As a result of our visit today, here are the action plans for you:    1. Medication(s) to stop: There are no discontinued medications.    2. Medication(s) to start or change:   Medications Ordered   Medications   ? doxycycline (MONODOX) 100 MG capsule     Sig: Take 1 capsule (100 mg total) by mouth 2 (two) times a day for 10 days.     Dispense:  20 capsule     Refill:  0   ? fluticasone propionate (FLONASE ALLERGY RELIEF) 50 mcg/actuation nasal spray     Si spray into each nostril daily.     Dispense:  16 g     Refill:  0       3. Other instructions: Yes      Acute Bacterial  Rhinosinusitis (ABRS)  Acute bacterial rhinosinusitis (ABRS) is an infection of your nasal cavity and sinuses. Its caused by bacteria. Acute means that youve had symptoms for less than 12 weeks.  Understanding your sinuses  The nasal cavity is the large air-filled space behind your nose. The sinuses are a group of spaces formed by the bones of your face. They connect with your nasal cavity. ABRS causes the tissue lining these spaces to become inflamed. Mucus may not drain normally. This leads to facial pain and other symptoms.  What causes ABRS?  ABRS most often follows an upper respiratory infection caused by a virus. Bacteria then infect the lining of your nasal cavity and sinuses. But you can also get ABRS if you have:    Nasal allergies    Long-term nasal swelling and congestion not caused by allergies    Blockage in the nose  Symptoms of ABRS  The symptoms of ABRS may be different for each person, and can include:    Nasal congestion    Runny nose    Fluid draining from the nose down the throat (postnasal drip)    Headache    Cough    Pain in the sinuses    Thick, colored fluid from the nose (mucus)    Fever  Diagnosing ABRS  ABRS may be diagnosed if youve had an upper respiratory infection like a cold and cough for longer than 10 to 14 days. Your health care provider will ask about your symptoms and your medical history. The provider will check your vital signs, including your temperature. Youll have a physical exam. The health care provider will check your ears, nose, and throat. You likely wont need any tests. If ABRS comes back, you may have a culture or other tests.  Treatment for ABRS  Treatment may include:    Antibiotic medicine. This is for symptoms that last for at least 10 to 14 days.    Nasal corticosteroid medicine. Drops or spray used in the nose can lessen swelling and congestion.    Over-the-counter pain medicine. This is to lessen sinus pain and pressure.    Nasal decongestant medicine. Spray  or drops may help to lessen congestion. Do not use them for more than a few days.    Salt wash (saline irrigation). This can help to loosen mucus.  Possible complications of ABRS  ABRS may come back or become long-term (chronic).  In rare cases, ABRS may cause complications such as:     Inflamed tissue around the brain and spinal cord (meningitis)    Inflamed tissue around the eyes (orbital cellulitis)    Inflamed bones around the sinuses (osteitis)  These problems may need to be treated in a hospital with intravenous (IV) antibiotic medicine or surgery.  When to call the health care provider  Call your health care provider if you have any of the following:    Symptoms that dont get better, or get worse    Symptoms that dont get better after 3 to 5 days on antibiotics    Trouble seeing    Swelling around your eyes    Confusion or trouble staying awake   Date Last Reviewed: 3/3/2015    [id number] [employer]. [address] [place] [address], [place] [address]. All rights reserved. This information is not intended as a substitute for professional medical care. Always follow your healthcare professional's instructions.

## 2021-07-03 NOTE — ADDENDUM NOTE
Addendum Note by Betty Vinson CMA at 8/6/2018  2:12 PM     Author: Betty Vinson CMA Service: -- Author Type: Certified Medical Assistant    Filed: 8/6/2018  2:12 PM Encounter Date: 8/6/2018 Status: Signed    : Betty Vinson CMA (Certified Medical Assistant)    Addended by: BETTY VINSON on: 8/6/2018 02:12 PM        Modules accepted: Orders

## 2021-07-04 NOTE — LETTER
Letter by Kane Mann MD at      Author: Kane Mann MD Service: -- Author Type: --    Filed:  Encounter Date: 6/10/2021 Status: (Other)                   Office Hours: Monday - Friday 8:00 - 4:30PM    Omi Grimm  [address] [facility] [address]           [place] 10, 2021      Dear Omi:    It looks as though you are due to see Dr Mann in August. If you would like to schedule this please call [phone]         Sincerely,        [facility]

## 2021-08-21 ENCOUNTER — HEALTH MAINTENANCE LETTER (OUTPATIENT)
Age: 56
End: 2021-08-21

## 2021-09-27 ENCOUNTER — HOSPITAL ENCOUNTER (OUTPATIENT)
Dept: CT IMAGING | Facility: CLINIC | Age: 56
Discharge: HOME | End: 2021-09-27
Attending: INTERNAL MEDICINE | Admitting: INTERNAL MEDICINE
Payer: COMMERCIAL

## 2021-09-27 DIAGNOSIS — C43.59 MALIGNANT MELANOMA OF SKIN OF TRUNK, EXCEPT SCROTUM (H): ICD-10-CM

## 2021-09-27 PROCEDURE — 250N000011 HC RX IP 250 OP 636: Performed by: INTERNAL MEDICINE

## 2021-09-27 PROCEDURE — 74177 CT ABD & PELVIS W/CONTRAST: CPT

## 2021-09-27 RX ORDER — IOPAMIDOL 755 MG/ML
100 INJECTION, SOLUTION INTRAVASCULAR ONCE
Status: COMPLETED | OUTPATIENT
Start: 2021-09-27 | End: 2021-09-27

## 2021-09-27 RX ADMIN — IOPAMIDOL 100 ML: 755 INJECTION, SOLUTION INTRAVENOUS at 07:55

## 2021-10-04 ENCOUNTER — ONCOLOGY VISIT (OUTPATIENT)
Dept: ONCOLOGY | Facility: CLINIC | Age: 56
End: 2021-10-04
Payer: COMMERCIAL

## 2021-10-04 ENCOUNTER — LAB (OUTPATIENT)
Dept: INFUSION THERAPY | Facility: CLINIC | Age: 56
End: 2021-10-04
Payer: COMMERCIAL

## 2021-10-04 VITALS
HEART RATE: 67 BPM | OXYGEN SATURATION: 99 % | BODY MASS INDEX: 25.67 KG/M2 | DIASTOLIC BLOOD PRESSURE: 90 MMHG | RESPIRATION RATE: 16 BRPM | WEIGHT: 189.5 LBS | HEIGHT: 72 IN | SYSTOLIC BLOOD PRESSURE: 130 MMHG

## 2021-10-04 DIAGNOSIS — C43.59 MALIGNANT MELANOMA OF SKIN OF TRUNK, EXCEPT SCROTUM (H): ICD-10-CM

## 2021-10-04 DIAGNOSIS — C43.59 MALIGNANT MELANOMA OF SKIN OF TRUNK, EXCEPT SCROTUM (H): Primary | ICD-10-CM

## 2021-10-04 LAB
ALBUMIN SERPL-MCNC: 3.8 G/DL (ref 3.5–5)
ALP SERPL-CCNC: 65 U/L (ref 45–120)
ALT SERPL W P-5'-P-CCNC: 21 U/L (ref 0–45)
ANION GAP SERPL CALCULATED.3IONS-SCNC: 10 MMOL/L (ref 5–18)
AST SERPL W P-5'-P-CCNC: 23 U/L (ref 0–40)
BASOPHILS # BLD AUTO: 0 10E3/UL (ref 0–0.2)
BASOPHILS NFR BLD AUTO: 1 %
BILIRUB SERPL-MCNC: 0.5 MG/DL (ref 0–1)
BUN SERPL-MCNC: 15 MG/DL (ref 8–22)
CALCIUM SERPL-MCNC: 9.8 MG/DL (ref 8.5–10.5)
CHLORIDE BLD-SCNC: 103 MMOL/L (ref 98–107)
CO2 SERPL-SCNC: 28 MMOL/L (ref 22–31)
CREAT SERPL-MCNC: 1.18 MG/DL (ref 0.7–1.3)
EOSINOPHIL # BLD AUTO: 0.2 10E3/UL (ref 0–0.7)
EOSINOPHIL NFR BLD AUTO: 5 %
ERYTHROCYTE [DISTWIDTH] IN BLOOD BY AUTOMATED COUNT: 13.5 % (ref 10–15)
GFR SERPL CREATININE-BSD FRML MDRD: 70 ML/MIN/1.73M2
GLUCOSE BLD-MCNC: 103 MG/DL (ref 70–125)
HCT VFR BLD AUTO: 46.4 % (ref 40–53)
HGB BLD-MCNC: 15.4 G/DL (ref 13.3–17.7)
IMM GRANULOCYTES # BLD: 0 10E3/UL
IMM GRANULOCYTES NFR BLD: 0 %
LYMPHOCYTES # BLD AUTO: 1.3 10E3/UL (ref 0.8–5.3)
LYMPHOCYTES NFR BLD AUTO: 28 %
MCH RBC QN AUTO: 28.7 PG (ref 26.5–33)
MCHC RBC AUTO-ENTMCNC: 33.2 G/DL (ref 31.5–36.5)
MCV RBC AUTO: 86 FL (ref 78–100)
MONOCYTES # BLD AUTO: 0.3 10E3/UL (ref 0–1.3)
MONOCYTES NFR BLD AUTO: 6 %
NEUTROPHILS # BLD AUTO: 2.8 10E3/UL (ref 1.6–8.3)
NEUTROPHILS NFR BLD AUTO: 60 %
NRBC # BLD AUTO: 0 10E3/UL
NRBC BLD AUTO-RTO: 0 /100
PLATELET # BLD AUTO: 227 10E3/UL (ref 150–450)
POTASSIUM BLD-SCNC: 4.2 MMOL/L (ref 3.5–5)
PROT SERPL-MCNC: 8.1 G/DL (ref 6–8)
RBC # BLD AUTO: 5.37 10E6/UL (ref 4.4–5.9)
SODIUM SERPL-SCNC: 141 MMOL/L (ref 136–145)
WBC # BLD AUTO: 4.6 10E3/UL (ref 4–11)

## 2021-10-04 PROCEDURE — 85025 COMPLETE CBC W/AUTO DIFF WBC: CPT

## 2021-10-04 PROCEDURE — G0463 HOSPITAL OUTPT CLINIC VISIT: HCPCS | Mod: 25

## 2021-10-04 PROCEDURE — 99213 OFFICE O/P EST LOW 20 MIN: CPT | Performed by: INTERNAL MEDICINE

## 2021-10-04 PROCEDURE — 36415 COLL VENOUS BLD VENIPUNCTURE: CPT

## 2021-10-04 PROCEDURE — 82040 ASSAY OF SERUM ALBUMIN: CPT

## 2021-10-04 ASSESSMENT — MIFFLIN-ST. JEOR: SCORE: 1737.57

## 2021-10-04 ASSESSMENT — PAIN SCALES - GENERAL: PAINLEVEL: NO PAIN (0)

## 2021-10-04 NOTE — PROGRESS NOTES
Oncology Rooming Note    October 4, 2021 9:49 AM   Omi Grimm is a 55 year old male who presents for:    Chief Complaint   Patient presents with     Oncology Clinic Visit     Initial Vitals: BP (!) 159/116 (BP Location: Right arm, Cuff Size: Adult Regular)   Pulse 67   Resp 16   Ht 1.829 m (6')   Wt 86 kg (189 lb 8 oz)   SpO2 99%   BMI 25.70 kg/m   Estimated body mass index is 25.7 kg/m  as calculated from the following:    Height as of this encounter: 1.829 m (6').    Weight as of this encounter: 86 kg (189 lb 8 oz). Body surface area is 2.09 meters squared.  No Pain (0) Comment: Data Unavailable   No LMP for male patient.  Allergies reviewed: Yes  Medications reviewed: Yes    Medications: Medication refills not needed today.  Pharmacy name entered into [employer]:    [facility] - [place], [place] - [address]  [facility] [id number] [place] [facility] - [place], [place] - [address]    Clinical concerns: f/u ARNALDO Allen

## 2021-10-04 NOTE — LETTER
10/4/2021         RE: Omi Grimm  [address] [facility] [address]        Dear Colleague,    Thank you for referring your patient, Omi Grimm, to the [facility]. Please see a copy of my visit note below.    Oncology Rooming Note    October 4, 2021 9:49 AM   Omi Grimm is a 55 year old male who presents for:    Chief Complaint   Patient presents with     Oncology Clinic Visit     Initial Vitals: BP (!) 159/116 (BP Location: Right arm, Cuff Size: Adult Regular)   Pulse 67   Resp 16   Ht 1.829 m (6')   Wt 86 kg (189 lb 8 oz)   SpO2 99%   BMI 25.70 kg/m   Estimated body mass index is 25.7 kg/m  as calculated from the following:    Height as of this encounter: 1.829 m (6').    Weight as of this encounter: 86 kg (189 lb 8 oz). Body surface area is 2.09 meters squared.  No Pain (0) Comment: Data Unavailable   No LMP for male patient.  Allergies reviewed: Yes  Medications reviewed: Yes    Medications: Medication refills not needed today.  Pharmacy name entered into [employer]:    [facility] - [place], [place] - [address]  [id number] - [place], [place] - [address]    Clinical concerns: f/u ARNALDO Allen              [facility] Hematology and Oncology Progress Note    Patient: Omi Grimm  MRN: [id number]  Date of Service: Oct 4, 2021        Assessment and Plan:    Cancer Staging  No matching staging information was found for the patient.    1.  Melanoma: He is now just over 7 years out from his definitive treatment.    He had a CT scan last week.  Images were personally reviewed.  There is no evidence of recurrent disease.  Also reviewed his labs which look fine.  I also reviewed the images with the patient.  We will have him come back in a year.  That could be his last visit.  With a CT scan prior.  He will call his dermatologist and make a follow-up appointment as well.    ECOG  Performance  0    Diagnosis:    Melanoma involving the back: Gee level IV. 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 Stage [id number]. IIC. Diagnosed June, 2014.     Treatment:    Wide excision and sentinel node biopsy in June 5, 2014. No adjuvant therapy was given.    Interim History:    Review returns today for a follow-up visit.  He was last seen in our clinic about 14 months ago.  In the interim he has been doing okay.  No changes in his health.  No new skin lesions that he is concerned about.    Review of Systems:    As above in the history.     Review of Systems otherwise Negative for:  General: chills, fever or night sweats  Psychological: anxiety or depression  Ophthalmic: blurry vision, double vision or loss of vision, vision change  ENT: epistaxis, oral lesions, hearing changes  Hematological and Lymphatic: bleeding, bruising, jaundice, swollen lymph nodes  Endocrine: hot flashes, unexpected weight changes  Respiratory: cough, hemoptysis, orthopnea or shortness of breath/BUTT  Cardiovascular: chest pain, edema, palpitations or PND  Gastrointestinal: abdominal pain, blood in stools, change in bowel habits, constipation, diarrhea or nausea/vomiting  Genito-Urinary: change in urinary stream, incontinence, frequency/urgency  Musculoskeletal: joint pain, stiffness, swelling, muscle pain  Neurological: dizziness, headaches, numbness/tingling  Dermatological: lumps and rash    Past History:    Past Medical History:   Diagnosis Date     Acute bronchitis      Allergic rhinitis      Asthma      Diverticulitis      Graves disease      Hypertension      Kidney stone 11/06/2017    first stone at age 52     Malignant melanoma of skin of trunk, except scrotum (H)     removed from back 2015     PONV (postoperative nausea and vomiting)      Unspecified hypothyroidism      Physical Exam:    BP (!) 130/90   Pulse 67   Resp 16   Ht 1.829 m (6')   Wt 86 kg (189 lb 8 oz)   SpO2 99%   BMI 25.70 kg/m      General: patient appears stated age of 55  year old. Nontoxic and in no distress.   HEENT: Head: atraumatic, normocephalic. Sclerae anicteric.  Chest:  Normal respiratory effort  Cardiac:  No edema.   Abdomen: abdomen is non-distended  Extremities: normal tone and muscle bulk.  Skin: no lesions or rash on visible skin. Warm and dry.   CNS: alert and oriented. Grossly non-focal.   Psychiatric: normal mood and affect.     Lab Results:    Recent Results (from the past 168 hour(s))   Comprehensive metabolic panel   Result Value Ref Range    Sodium 141 136 - 145 mmol/L    Potassium 4.2 3.5 - 5.0 mmol/L    Chloride 103 98 - 107 mmol/L    Carbon Dioxide (CO2) 28 22 - 31 mmol/L    Anion Gap 10 5 - 18 mmol/L    Urea Nitrogen 15 8 - 22 mg/dL    Creatinine 1.18 0.70 - 1.30 mg/dL    Calcium 9.8 8.5 - 10.5 mg/dL    Glucose 103 70 - 125 mg/dL    Alkaline Phosphatase 65 45 - 120 U/L    AST 23 0 - 40 U/L    ALT 21 0 - 45 U/L    Protein Total 8.1 (H) 6.0 - 8.0 g/dL    Albumin 3.8 3.5 - 5.0 g/dL    Bilirubin Total 0.5 0.0 - 1.0 mg/dL    GFR Estimate 70 >60 mL/min/1.73m2   CBC with platelets and differential   Result Value Ref Range    WBC Count 4.6 4.0 - 11.0 10e3/uL    RBC Count 5.37 4.40 - 5.90 10e6/uL    Hemoglobin 15.4 13.3 - 17.7 g/dL    Hematocrit 46.4 40.0 - 53.0 %    MCV 86 78 - 100 fL    MCH 28.7 26.5 - 33.0 pg    MCHC 33.2 31.5 - 36.5 g/dL    RDW 13.5 10.0 - 15.0 %    Platelet Count 227 150 - 450 10e3/uL    % Neutrophils 60 %    % Lymphocytes 28 %    % Monocytes 6 %    % Eosinophils 5 %    % Basophils 1 %    % Immature Granulocytes 0 %    NRBCs per 100 WBC 0 <1 /100    Absolute Neutrophils 2.8 1.6 - 8.3 10e3/uL    Absolute Lymphocytes 1.3 0.8 - 5.3 10e3/uL    Absolute Monocytes 0.3 0.0 - 1.3 10e3/uL    Absolute Eosinophils 0.2 0.0 - 0.7 10e3/uL    Absolute Basophils 0.0 0.0 - 0.2 10e3/uL    Absolute Immature Granulocytes 0.0 <=0.0 10e3/uL    Absolute NRBCs 0.0 10e3/uL     Imaging:    CT Chest/Abdomen/Pelvis w Contrast    Result Date: 9/27/2021  EXAM: CT  CHEST/ABDOMEN/PELVIS W CONTRAST LOCATION: [facility] DATE/TIME: 9/27/2021 7:52 AM INDICATION: Follow-up melanoma. COMPARISON: CT CAP 8/14/2020 and 8/8/2019. TECHNIQUE: CT scan of the chest, abdomen, and pelvis was performed following injection of IV contrast. Multiplanar reformats were obtained. Dose reduction techniques were used. CONTRAST: Isovue-370 100 mL IV. FINDINGS: LUNGS AND PLEURA: Several small benign calcified granulomas in each lung. Lungs are otherwise clear. No pleural effusion. MEDIASTINUM/AXILLAE: No lymphadenopathy. Heart size within normal limits. No pericardial effusion. Small esophageal hiatal hernia. CORONARY ARTERY CALCIFICATION: No visible coronary artery calcification. HEPATOBILIARY: Moderate diffuse hepatic steatosis. Liver otherwise normal. No bile duct dilatation. Single calcified stone in the otherwise normal-appearing gallbladder. PANCREAS: Normal. SPLEEN: Spleen size normal. ADRENAL GLANDS: Normal. KIDNEY/BLADDER: Kidneys, ureters and bladder are normal. BOWEL: Distal sigmoid anastomosis. Normal appendix. Colonic diverticulosis. Bowel is otherwise normal with no obstruction or inflammatory change. LYMPH NODES: No lymphadenopathy. VASCULATURE: Normal caliber abdominal aorta with mild calcified atheromatous plaque.  PELVIC ORGANS: No pelvic mass or fluid. MUSCULOSKELETAL: No suspicious bone lesions.     IMPRESSION: 1.  No evidence of metastatic disease in the chest, abdomen or pelvis. 2.  Cholelithiasis. 3.  Moderate diffuse hepatic steatosis. 4.  Colonic diverticulosis with no diverticulitis.      Signed by: Kane Mann MD        Again, thank you for allowing me to participate in the care of your patient.        Sincerely,        Kane Mann MD

## 2021-10-04 NOTE — PROGRESS NOTES
[facility] Hematology and Oncology Progress Note    Patient: Omi Grimm  MRN: [id number]  Date of Service: Oct 4, 2021        Assessment and Plan:    Cancer Staging  No matching staging information was found for the patient.    1.  Melanoma: He is now just over 7 years out from his definitive treatment.    He had a CT scan last week.  Images were personally reviewed.  There is no evidence of recurrent disease.  Also reviewed his labs which look fine.  I also reviewed the images with the patient.  We will have him come back in a year.  That could be his last visit.  With a CT scan prior.  He will call his dermatologist and make a follow-up appointment as well.    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 Stage [id number]. IIC. Diagnosed June, 2014.     Treatment:    Wide excision and sentinel node biopsy in June 5, 2014. No adjuvant therapy was given.    Interim History:    Review returns today for a follow-up visit.  He was last seen in our clinic about 14 months ago.  In the interim he has been doing okay.  No changes in his health.  No new skin lesions that he is concerned about.    Review of Systems:    As above in the history.     Review of Systems otherwise Negative for:  General: chills, fever or night sweats  Psychological: anxiety or depression  Ophthalmic: blurry vision, double vision or loss of vision, vision change  ENT: epistaxis, oral lesions, hearing changes  Hematological and Lymphatic: bleeding, bruising, jaundice, swollen lymph nodes  Endocrine: hot flashes, unexpected weight changes  Respiratory: cough, hemoptysis, orthopnea or shortness of breath/BUTT  Cardiovascular: chest pain, edema, palpitations or PND  Gastrointestinal: abdominal pain, blood in stools, change in bowel habits, constipation, diarrhea or nausea/vomiting  Genito-Urinary: change in urinary stream, incontinence, frequency/urgency  Musculoskeletal: joint pain,  stiffness, swelling, muscle pain  Neurological: dizziness, headaches, numbness/tingling  Dermatological: lumps and rash    Past History:    Past Medical History:   Diagnosis Date     Acute bronchitis      Allergic rhinitis      Asthma      Diverticulitis      Graves disease      Hypertension      Kidney stone 11/06/2017    first stone at age 52     Malignant melanoma of skin of trunk, except scrotum (H)     removed from back 2015     PONV (postoperative nausea and vomiting)      Unspecified hypothyroidism      Physical Exam:    BP (!) 130/90   Pulse 67   Resp 16   Ht 1.829 m (6')   Wt 86 kg (189 lb 8 oz)   SpO2 99%   BMI 25.70 kg/m      General: patient appears stated age of 55 year old. Nontoxic and in no distress.   HEENT: Head: atraumatic, normocephalic. Sclerae anicteric.  Chest:  Normal respiratory effort  Cardiac:  No edema.   Abdomen: abdomen is non-distended  Extremities: normal tone and muscle bulk.  Skin: no lesions or rash on visible skin. Warm and dry.   CNS: alert and oriented. Grossly non-focal.   Psychiatric: normal mood and affect.     Lab Results:    Recent Results (from the past 168 hour(s))   Comprehensive metabolic panel   Result Value Ref Range    Sodium 141 136 - 145 mmol/L    Potassium 4.2 3.5 - 5.0 mmol/L    Chloride 103 98 - 107 mmol/L    Carbon Dioxide (CO2) 28 22 - 31 mmol/L    Anion Gap 10 5 - 18 mmol/L    Urea Nitrogen 15 8 - 22 mg/dL    Creatinine 1.18 0.70 - 1.30 mg/dL    Calcium 9.8 8.5 - 10.5 mg/dL    Glucose 103 70 - 125 mg/dL    Alkaline Phosphatase 65 45 - 120 U/L    AST 23 0 - 40 U/L    ALT 21 0 - 45 U/L    Protein Total 8.1 (H) 6.0 - 8.0 g/dL    Albumin 3.8 3.5 - 5.0 g/dL    Bilirubin Total 0.5 0.0 - 1.0 mg/dL    GFR Estimate 70 >60 mL/min/1.73m2   CBC with platelets and differential   Result Value Ref Range    WBC Count 4.6 4.0 - 11.0 10e3/uL    RBC Count 5.37 4.40 - 5.90 10e6/uL    Hemoglobin 15.4 13.3 - 17.7 g/dL    Hematocrit 46.4 40.0 - 53.0 %    MCV 86 78 - 100 fL     MCH 28.7 26.5 - 33.0 pg    MCHC 33.2 31.5 - 36.5 g/dL    RDW 13.5 10.0 - 15.0 %    Platelet Count 227 150 - 450 10e3/uL    % Neutrophils 60 %    % Lymphocytes 28 %    % Monocytes 6 %    % Eosinophils 5 %    % Basophils 1 %    % Immature Granulocytes 0 %    NRBCs per 100 WBC 0 <1 /100    Absolute Neutrophils 2.8 1.6 - 8.3 10e3/uL    Absolute Lymphocytes 1.3 0.8 - 5.3 10e3/uL    Absolute Monocytes 0.3 0.0 - 1.3 10e3/uL    Absolute Eosinophils 0.2 0.0 - 0.7 10e3/uL    Absolute Basophils 0.0 0.0 - 0.2 10e3/uL    Absolute Immature Granulocytes 0.0 <=0.0 10e3/uL    Absolute NRBCs 0.0 10e3/uL     Imaging:    CT Chest/Abdomen/Pelvis w Contrast    Result Date: 9/27/2021  EXAM: CT CHEST/ABDOMEN/PELVIS W CONTRAST LOCATION: [facility] DATE/TIME: 9/27/2021 7:52 AM INDICATION: Follow-up melanoma. COMPARISON: CT CAP 8/14/2020 and 8/8/2019. TECHNIQUE: CT scan of the chest, abdomen, and pelvis was performed following injection of IV contrast. Multiplanar reformats were obtained. Dose reduction techniques were used. CONTRAST: Isovue-370 100 mL IV. FINDINGS: LUNGS AND PLEURA: Several small benign calcified granulomas in each lung. Lungs are otherwise clear. No pleural effusion. MEDIASTINUM/AXILLAE: No lymphadenopathy. Heart size within normal limits. No pericardial effusion. Small esophageal hiatal hernia. CORONARY ARTERY CALCIFICATION: No visible coronary artery calcification. HEPATOBILIARY: Moderate diffuse hepatic steatosis. Liver otherwise normal. No bile duct dilatation. Single calcified stone in the otherwise normal-appearing gallbladder. PANCREAS: Normal. SPLEEN: Spleen size normal. ADRENAL GLANDS: Normal. KIDNEY/BLADDER: Kidneys, ureters and bladder are normal. BOWEL: Distal sigmoid anastomosis. Normal appendix. Colonic diverticulosis. Bowel is otherwise normal with no obstruction or inflammatory change. LYMPH NODES: No lymphadenopathy. VASCULATURE: Normal caliber abdominal aorta with mild calcified  atheromatous plaque.  PELVIC ORGANS: No pelvic mass or fluid. MUSCULOSKELETAL: No suspicious bone lesions.     IMPRESSION: 1.  No evidence of metastatic disease in the chest, abdomen or pelvis. 2.  Cholelithiasis. 3.  Moderate diffuse hepatic steatosis. 4.  Colonic diverticulosis with no diverticulitis.      Signed by: Kane Mann MD

## 2021-10-16 ENCOUNTER — HEALTH MAINTENANCE LETTER (OUTPATIENT)
Age: 56
End: 2021-10-16

## 2021-12-20 ENCOUNTER — TRANSFERRED RECORDS (OUTPATIENT)
Dept: HEALTH INFORMATION MANAGEMENT | Facility: CLINIC | Age: 56
End: 2021-12-20
Payer: COMMERCIAL

## 2021-12-29 ENCOUNTER — OFFICE VISIT (OUTPATIENT)
Dept: FAMILY MEDICINE | Facility: CLINIC | Age: 56
End: 2021-12-29
Payer: COMMERCIAL

## 2021-12-29 VITALS
BODY MASS INDEX: 25.47 KG/M2 | HEIGHT: 72 IN | HEART RATE: 86 BPM | OXYGEN SATURATION: 100 % | SYSTOLIC BLOOD PRESSURE: 126 MMHG | DIASTOLIC BLOOD PRESSURE: 84 MMHG | WEIGHT: 188 LBS

## 2021-12-29 DIAGNOSIS — S76.012A STRAIN OF LEFT HIP AND THIGH, INITIAL ENCOUNTER: ICD-10-CM

## 2021-12-29 DIAGNOSIS — J45.20 MILD INTERMITTENT ASTHMA WITHOUT COMPLICATION: ICD-10-CM

## 2021-12-29 DIAGNOSIS — Z13.220 LIPID SCREENING: ICD-10-CM

## 2021-12-29 DIAGNOSIS — Z12.5 SCREENING PSA (PROSTATE SPECIFIC ANTIGEN): ICD-10-CM

## 2021-12-29 DIAGNOSIS — S76.912A STRAIN OF LEFT HIP AND THIGH, INITIAL ENCOUNTER: ICD-10-CM

## 2021-12-29 DIAGNOSIS — Z13.1 SCREENING FOR DIABETES MELLITUS: ICD-10-CM

## 2021-12-29 DIAGNOSIS — Z00.00 ROUTINE GENERAL MEDICAL EXAMINATION AT A HEALTH CARE FACILITY: Primary | ICD-10-CM

## 2021-12-29 DIAGNOSIS — E03.9 ACQUIRED HYPOTHYROIDISM: ICD-10-CM

## 2021-12-29 LAB
CHOLEST SERPL-MCNC: 252 MG/DL
FASTING STATUS PATIENT QL REPORTED: YES
FASTING STATUS PATIENT QL REPORTED: YES
GLUCOSE BLD-MCNC: 96 MG/DL (ref 70–125)
HDLC SERPL-MCNC: 41 MG/DL
LDLC SERPL CALC-MCNC: 154 MG/DL
PSA SERPL-MCNC: 1.79 UG/L (ref 0–3.5)
T4 FREE SERPL-MCNC: 1.05 NG/DL (ref 0.7–1.8)
TRIGL SERPL-MCNC: 283 MG/DL
TSH SERPL DL<=0.005 MIU/L-ACNC: 41.43 UIU/ML (ref 0.3–5)

## 2021-12-29 PROCEDURE — 99396 PREV VISIT EST AGE 40-64: CPT | Performed by: FAMILY MEDICINE

## 2021-12-29 PROCEDURE — 84443 ASSAY THYROID STIM HORMONE: CPT | Performed by: FAMILY MEDICINE

## 2021-12-29 PROCEDURE — 36415 COLL VENOUS BLD VENIPUNCTURE: CPT | Performed by: FAMILY MEDICINE

## 2021-12-29 PROCEDURE — 84439 ASSAY OF FREE THYROXINE: CPT | Performed by: FAMILY MEDICINE

## 2021-12-29 PROCEDURE — G0103 PSA SCREENING: HCPCS | Performed by: FAMILY MEDICINE

## 2021-12-29 PROCEDURE — 82947 ASSAY GLUCOSE BLOOD QUANT: CPT | Performed by: FAMILY MEDICINE

## 2021-12-29 PROCEDURE — 80061 LIPID PANEL: CPT | Performed by: FAMILY MEDICINE

## 2021-12-29 RX ORDER — ALBUTEROL SULFATE 90 UG/1
AEROSOL, METERED RESPIRATORY (INHALATION)
Qty: 18 G | Refills: 0 | Status: SHIPPED | OUTPATIENT
Start: 2021-12-29

## 2021-12-29 ASSESSMENT — MIFFLIN-ST. JEOR: SCORE: 1725.76

## 2021-12-29 NOTE — PROGRESS NOTES
SUBJECTIVE:   CC: Omi Grimm is an 56 year old male who presents for preventative health visit.     Patient has been advised of split billing requirements and indicates understanding: Yes  Healthy Habits:     Getting at least 3 servings of Calcium per day:  Yes    Bi-annual eye exam:  NO    Dental care twice a year:  Yes    Sleep apnea or symptoms of sleep apnea:  None    Diet:  Regular (no restrictions)    Frequency of exercise:  None    Taking medications regularly:  Yes    Medication side effects:  Not applicable    PHQ-2 Total Score: 1    Additional concerns today:  No    Left hip and knee discomfort with activity ongoing for the past several years. No known associated injuries.  Hip bothers him more than the knee.  Patient has not been seen for this concern previously.  He notes that his is affecting his activities to a moderate degree.  He does not feel that his symptoms are severe enough to warrant visiting with orthopedics or pursuing formal physical therapy.  He would be open to home exercises at this point.    History of melanoma.  Had recently seen dermatology for annual skin check.  No new concerning lesions.     History of partial thyroidectomy and parathyroidectomy  - takes levothyroxine and notes no concerning symptoms.  iis due to have the thyroid labs rechecked today.     History of intermittent asthma -for current symptoms please see questionnaire below  Asthma Control Test (Copyright [employer], 2011; Used with Permission) 12/29/2021   1.  In the past 4 weeks, how much of the time did your asthma keep you from getting as much done at work, school or at home? 5   2.  During the past 4 weeks, how often have you had shortness of breath? 4   3.  During the past 4 weeks, how often did your asthma symptoms (wheezing, coughing, shortness of breath, chest tightness or pain) wake you up at night or earlier than usual in the morning? 5   4.  During the past 4 weeks, how often have you used your  rescue inhaler or nebulizer medication (such as albuterol)? 3   5.  How would you rate your asthma control during the past 4 weeks? 3   ACT Total Score (Goal >/= 20) 20   In the past 12 months, how many times did you visit the emergency room for your asthma without being admitted to the hospital? 0   In the past 12 months, how many times were you hospitalized overnight because of your asthma? 0   RN / PROVIDER ONLY: Is this patient having an acute exacerbation today? No       Today's PHQ-2 Score:   PHQ-2 ( 1999 [employer]) 12/29/2021   Q1: Little interest or pleasure in doing things 1   Q2: Feeling down, depressed or hopeless 0   PHQ-2 Score 1   Q1: Little interest or pleasure in doing things Several days   Q2: Feeling down, depressed or hopeless Not at all   PHQ-2 Score 1       Abuse: Current or Past(Physical, Sexual or Emotional)- No  Do you feel safe in your environment? Yes    Have you ever done Advance Care Planning? (For example, a Health Directive, POLST, or a discussion with a medical provider or your loved ones about your wishes): No, advance care planning information given to patient to review.  Patient declined advance care planning discussion at this time.    Social History     Tobacco Use     Smoking status: Never Smoker     Smokeless tobacco: Never Used   Substance Use Topics     Alcohol use: No       Alcohol Use 12/29/2021   Prescreen: >3 drinks/day or >7 drinks/week? No     Last PSA: No results found for: PSA    Reviewed orders with patient. Reviewed health maintenance and updated orders accordingly - Yes    Reviewed and updated as needed this visit by clinical staff  Tobacco  Allergies  Meds  Problems  Med Hx  Surg Hx  Fam Hx  Soc Hx         Reviewed and updated as needed this visit by Provider  Tobacco  Allergies  Meds  Problems  Med Hx  Surg Hx  Fam Hx        Past Medical History:   Diagnosis Date     Acute bronchitis      Allergic rhinitis      Asthma      Diverticulitis      Graves  disease      Hypertension      Kidney stone 11/06/2017    first stone at age 52     Malignant melanoma of skin of trunk, except scrotum (H)     removed from back 2015     PONV (postoperative nausea and vomiting)      Unspecified hypothyroidism       Past Surgical History:   Procedure Laterality Date     BIOPSY SKIN (LOCATION)       EYE SURGERY      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     HEMANGIOMA EXCISION      as an infant     OTHER SURGICAL HISTORY      wrist surgery, right     MA BX/REMV,LYMPH NODE,DEEP AXILL Left 6/5/2014    Procedure:  AXILLARY LYMPH NODE DISSECTION-LEFT;  Surgeon: Sergey Glover MD;  Location: [facility];  Service: General     MA CYSTO/URETERO W/LITHOTRIPSY &INDWELL STENT INSRT Left 11/14/2017    Procedure: CYSTOSCOPY, WITH LEFT URETEROSCOPY,  LASER LITHOTRIPSY STONE EXTRACTION STENT INSERTION;  Surgeon: González Lagos MD;  Location: [facility];  Service: Urology     MA EXCIS SUPRATENT MENINGIOMA      Description: Craniotomy Supratentorial Excision Of Meningioma;  Recorded: 02/06/2013;  Comments: Excision of frontal facial meningioma at age 6 months (residual ptosis right eye)     MA LAP,SURG,COLECTOMY, PARTIAL, W/ANAST N/A 2/14/2020    Procedure: COLECTOMY, SIGMOID, ROBOT-ASSISTED, LAPAROSCOPIC, USING DA CHRIST XI;  Surgeon: Jefferson Rodriguez DO;  Location: [facility];  Service: General     SINUS SURGERY      polyposis     SINUS SURGERY  April 2011 - (x2)    polyp removal x2     THYROIDECTOMY, PARTIAL N/A 10/9/2018    Procedure: PARATHYROIDECTOMY 23HR;  Surgeon: Vincent Linn MD;  Location: [facility];  Service:      VASECTOMY  12/2011     WRIST SURGERY  2003       Review of Systems  CONSTITUTIONAL: NEGATIVE for fever, chills, change in weight  INTEGUMENTARY/SKIN: History of melanoma as noted above.  Otherwise NEGATIVE for worrisome rashes, moles or lesions  EYES: NEGATIVE for vision changes or irritation  ENT: NEGATIVE for ear, mouth and throat  problems  RESP:as above  CV: NEGATIVE for chest pain, palpitations or peripheral edema  GI: NEGATIVE for nausea, abdominal pain, heartburn, or change in bowel habits   male: negative for dysuria, hematuria, decreased urinary stream, erectile dysfunction, urethral discharge  MUSCULOSKELETAL: See HPI  NEURO: NEGATIVE for weakness, dizziness or paresthesias  PSYCHIATRIC: NEGATIVE for changes in mood or affect  ENDOCRINE: History of postsurgical hypothyroidism, as noted above.    OBJECTIVE:   /84   Pulse 86   Ht 1.829 m (6')   Wt 85.3 kg (188 lb)   SpO2 100%   BMI 25.50 kg/m      Physical Exam  GENERAL: healthy, alert and no distress  EYES: Eyes grossly normal to inspection, PERRL and conjunctivae and sclerae normal  HENT: ear canals and TM's normal, nose and mouth without ulcers or lesions  NECK: no adenopathy, no asymmetry, masses, or scars and thyroid normal to palpation  RESP: lungs clear to auscultation - no rales, rhonchi or wheezes  CV: regular rate and rhythm, normal S1 S2, no S3 or S4, no murmur, click or rub, no peripheral edema and peripheral pulses strong  ABDOMEN: soft, nontender, no hepatosplenomegaly, no masses and bowel sounds normal  MS: no gross musculoskeletal defects noted, no edema  SKIN: no suspicious lesions or rashes  NEURO: Normal strength and tone, mentation intact and speech normal  PSYCH: mentation appears normal, affect normal/bright    Component      Latest Ref Rng & Units 12/29/2021 12/29/2021          10:47 AM 10:47 AM   Cholesterol      <=199 mg/dL 252 (H)    Triglycerides      <=149 mg/dL 283 (H)    HDL Cholesterol      >=40 mg/dL 41    LDL Cholesterol Calculated      <=129 mg/dL 154 (H)    Patient Fasting > 8hrs?       Yes Yes   Glucose      70 - 125 mg/dL  96   TSH      0.30 - 5.00 uIU/mL 41.43 (H)    T4 Free      0.70 - 1.80 ng/dL 1.05    PSA      0.00 - 3.50 ug/L 1.79        ASSESSMENT/PLAN:   Omi was seen today for physical.    Diagnoses and all orders for this  visit:    Routine general medical examination at a health care facility    Mild intermittent asthma without complication-patient with moderate control currently.  Will continue with as needed use of albuterol metered-dose inhaler  -     albuterol (VENTOLIN HFA) 108 (90 Base) MCG/ACT inhaler; INHALE TWO (2) PUFF(S) EVERY 4 HOURS AS NEEDED    Acquired hypothyroidism -TSH is noted to be significantly elevated today with normal free T4 level.  Recommended increasing the patient's dose of levothyroxine to 200 mcg daily.  Follow-up lab visit to recheck TSH and free T4 in 6 weeks.  -     TSH; Future  -     T4, free; Future  -     TSH  -     T4, free    Screening PSA (prostate specific antigen)  -     PSA, screen; Future  -     PSA, screen    Lipid screening  -     Lipid panel reflex to direct LDL Fasting; Future  -     Lipid panel reflex to direct LDL Fasting    Screening for diabetes mellitus  -     Glucose; Future  -     Glucose    Strain of left hip and thigh, initial encounter -reviewed treatment options with patient.  At this time he prefers to pursue conservative measures.  Home exercises reviewed.      COUNSELING:   Reviewed preventive health counseling, as reflected in patient instructions  Special attention given to:        Regular exercise       Healthy diet/nutrition       Immunizations    Declined: Influenza     Estimated body mass index is 25.7 kg/m  as calculated from the following:    Height as of 10/4/21: 1.829 m (6').    Weight as of 10/4/21: 86 kg (189 lb 8 oz).     Weight management plan: Discussed healthy diet and exercise guidelines    He reports that he has never smoked. He has never used smokeless tobacco.    Tom Ramirez MD  [facility]

## 2021-12-29 NOTE — LETTER
My Asthma Action Plan    Name: Omi Grimm   YOB: 1965  Date: 2/1/2022   My doctor: Tom Ramirez MD   My clinic: [facility]        My Rescue Medicine:   Albuterol inhaler (Proair/Ventolin/Proventil HFA)  2-4 puffs EVERY 4 HOURS as needed. Use a spacer if recommended by your provider.   My Asthma Severity:   Intermittent / Exercise Induced  Know your asthma triggers: upper respiratory infections             GREEN ZONE   Good Control    I feel good    No cough or wheeze    Can work, sleep and play without asthma symptoms       Take your asthma control medicine every day.     1. If exercise triggers your asthma, take your rescue medication    15 minutes before exercise or sports, and    During exercise if you have asthma symptoms  2. Spacer to use with inhaler: If you have a spacer, make sure to use it with your inhaler             YELLOW ZONE Getting Worse  I have ANY of these:    I do not feel good    Cough or wheeze    Chest feels tight    Wake up at night   1. Keep taking your Green Zone medications  2. Start taking your rescue medicine:    every 20 minutes for up to 1 hour. Then every 4 hours for 24-48 hours.  3. If you stay in the Yellow Zone for more than 12-24 hours, contact your doctor.  4. If you do not return to the Green Zone in 12-24 hours or you get worse, start taking your oral steroid medicine if prescribed by your provider.           RED ZONE Medical Alert - Get Help  I have ANY of these:    I feel awful    Medicine is not helping    Breathing getting harder    Trouble walking or talking    Nose opens wide to breathe       1. Take your rescue medicine NOW  2. If your provider has prescribed an oral steroid medicine, start taking it NOW  3. Call your doctor NOW  4. If you are still in the Red Zone after 20 minutes and you have not reached your doctor:    Take your rescue medicine again and    Call 911 or go to the emergency room right away    See  your regular doctor within 2 weeks of an Emergency Room or Urgent Care visit for follow-up treatment.          Annual Reminders:  Meet with Asthma Educator,  Flu Shot in the Fall, consider Pneumonia Vaccination for patients with asthma (aged 19 and older).    Pharmacy:    [facility] [address]  [facility] [id number] [place] [employer] - [facility] [address]    Electronically signed by Tom Ramirez MD   Date: 02/01/22                    Asthma Triggers  How To Control Things That Make Your Asthma Worse    Triggers are things that make your asthma worse.  Look at the list below to help you find your triggers and   what you can do about them. You can help prevent asthma flare-ups by staying away from your triggers.      Trigger                                                          What you can do   Cigarette Smoke  Tobacco smoke can make asthma worse. Do not allow smoking in your home, car or around you.  Be sure no one smokes at a child s day care or school.  If you smoke, ask your health care provider for ways to help you quit.  Ask family members to quit too.  Ask your health care provider for a referral to Quit Plan to help you quit smoking, or call 0-494-433-PLAN.     Colds, Flu, Bronchitis  These are common triggers of asthma. Wash your hands often.  Don t touch your eyes, nose or mouth.  Get a flu shot every year.     Dust Mites  These are tiny bugs that live in cloth or carpet. They are too small to see. Wash sheets and blankets in hot water every week.   Encase pillows and mattress in dust mite proof covers.  Avoid having carpet if you can. If you have carpet, vacuum weekly.   Use a dust mask and HEPA vacuum.   Pollen and Outdoor Mold  Some people are allergic to trees, grass, or weed pollen, or molds. Try to keep your windows closed.  Limit time out doors when pollen count is high.   Ask you health care provider about taking medicine during allergy season.     Animal Dander  Some  people are allergic to skin flakes, urine or saliva from pets with fur or feathers. Keep pets with fur or feathers out of your home.    If you can t keep the pet outdoors, then keep the pet out of your bedroom.  Keep the bedroom door closed.  Keep pets off cloth furniture and away from stuffed toys.     Mice, Rats, and Cockroaches  Some people are allergic to the waste from these pests.   Cover food and garbage.  Clean up spills and food crumbs.  Store grease in the refrigerator.   Keep food out of the bedroom.   Indoor Mold  This can be a trigger if your home has high moisture. Fix leaking faucets, pipes, or other sources of water.   Clean moldy surfaces.  Dehumidify basement if it is damp and smelly.   Smoke, Strong Odors, and Sprays  These can reduce air quality. Stay away from strong odors and sprays, such as perfume, powder, hair spray, paints, smoke incense, paint, cleaning products, candles and new carpet.   Exercise or Sports  Some people with asthma have this trigger. Be active!  Ask your doctor about taking medicine before sports or exercise to prevent symptoms.    Warm up for 5-10 minutes before and after sports or exercise.     Other Triggers of Asthma  Cold air:  Cover your nose and mouth with a scarf.  Sometimes laughing or crying can be a trigger.  Some medicines and food can trigger asthma.

## 2021-12-30 ASSESSMENT — ASTHMA QUESTIONNAIRES: ACT_TOTALSCORE: 20

## 2022-01-31 ENCOUNTER — MYC MEDICAL ADVICE (OUTPATIENT)
Dept: FAMILY MEDICINE | Facility: CLINIC | Age: 57
End: 2022-01-31
Payer: COMMERCIAL

## 2022-01-31 DIAGNOSIS — E03.9 HYPOTHYROIDISM, UNSPECIFIED TYPE: ICD-10-CM

## 2022-02-01 RX ORDER — LEVOTHYROXINE SODIUM 200 UG/1
200 TABLET ORAL DAILY
Qty: 90 TABLET | Refills: 1 | Status: SHIPPED | OUTPATIENT
Start: 2022-02-01 | End: 2023-03-08

## 2022-03-29 ENCOUNTER — OFFICE VISIT (OUTPATIENT)
Dept: FAMILY MEDICINE | Facility: CLINIC | Age: 57
End: 2022-03-29
Payer: COMMERCIAL

## 2022-03-29 VITALS
OXYGEN SATURATION: 97 % | WEIGHT: 194 LBS | BODY MASS INDEX: 26.28 KG/M2 | DIASTOLIC BLOOD PRESSURE: 72 MMHG | HEIGHT: 72 IN | SYSTOLIC BLOOD PRESSURE: 130 MMHG | HEART RATE: 73 BPM

## 2022-03-29 DIAGNOSIS — G89.29 CHRONIC RIGHT-SIDED LOW BACK PAIN WITH LEFT-SIDED SCIATICA: Primary | ICD-10-CM

## 2022-03-29 DIAGNOSIS — M54.42 CHRONIC RIGHT-SIDED LOW BACK PAIN WITH LEFT-SIDED SCIATICA: Primary | ICD-10-CM

## 2022-03-29 PROCEDURE — 99214 OFFICE O/P EST MOD 30 MIN: CPT | Performed by: PHYSICIAN ASSISTANT

## 2022-03-29 RX ORDER — CYCLOBENZAPRINE HCL 5 MG
5 TABLET ORAL 3 TIMES DAILY PRN
Qty: 30 TABLET | Refills: 0 | Status: SHIPPED | OUTPATIENT
Start: 2022-03-29 | End: 2022-10-03

## 2022-03-29 RX ORDER — PREDNISONE 20 MG/1
40 TABLET ORAL DAILY
Qty: 20 TABLET | Refills: 0 | Status: SHIPPED | OUTPATIENT
Start: 2022-03-29 | End: 2022-04-08

## 2022-03-29 ASSESSMENT — ENCOUNTER SYMPTOMS
WHEEZING: 0
ACTIVITY CHANGE: 0
SHORTNESS OF BREATH: 0
BACK PAIN: 1
COUGH: 0
GASTROINTESTINAL NEGATIVE: 1
CHILLS: 0
CARDIOVASCULAR NEGATIVE: 1
FATIGUE: 0
FEVER: 0
RESPIRATORY NEGATIVE: 1

## 2022-03-29 NOTE — PROGRESS NOTES
Progress Note  3/29/22     Chief Complaint   Patient presents with     Back Pain     lower back pain goes down left leg, no known injury, hx of back pain , this is more down the left side, sx started on saturday          HPI    Omi Grimm is a pleasant  56 year old year old male  who present to the clinic today for evaluation on left-sided lower back pain that radiates down the posterior aspect of his left leg to his knee.  His pain started Saturday.  His pain is worse with sitting but is improved with walking.  No known injury.  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  He has been doing Tylenol which has not helped his pain.  He has not used ice or heat.  His pain does not go into the toes.  He describes it as a sharp pain mostly to the lower back/buttocks region in the posterior thigh.  He denies any bowel or bladder issues.  He does have chronic lower back pain but his back pain has never caused the pain in the posterior aspect of the thigh.    ROS    Review of Systems   Constitutional: Negative for activity change, chills, fatigue and fever.   Respiratory: Negative.  Negative for cough, shortness of breath and wheezing.    Cardiovascular: Negative.  Negative for chest pain.   Gastrointestinal: Negative.    Musculoskeletal: Positive for back pain.        Left lower back pain that radiates through the posterior buttock and posterior thigh to knee.  No new injury.  Does work in a restaurant and does a lot of heavy twisting lifting and bending.  No bowel or bladder issues.  Fevers or chills   Skin: Negative for rash.            Patient Active Problem List   Diagnosis     Multiple Environmental Allergies     Mild intermittent asthma     Pain In The Hands     Hypothyroidism     Graves' Disease     Allergic Rhinitis     Lump In / On The Skin     Malignant Melanoma Of The Back     Dysphagia     History of diverticulitis     Hypercalcemia     Asymptomatic gallstones      Urinary tract stones     Hyperparathyroidism (H)     Diverticulosis     S/P partial colectomy        Past Medical History:   Diagnosis Date     Acute bronchitis      Allergic rhinitis      Asthma      Diverticulitis      Graves disease      Hypertension      Kidney stone 11/06/2017    first stone at age 52     Malignant melanoma of skin of trunk, except scrotum (H)     removed from back 2015     PONV (postoperative nausea and vomiting)      Unspecified hypothyroidism           Social History     Socioeconomic History     Marital status:      Spouse name: Not on file     Number of children: Not on file     Years of education: Not on file     Highest education level: Not on file   Occupational History     Not on file   Tobacco Use     Smoking status: Never Smoker     Smokeless tobacco: Never Used   Substance and Sexual Activity     Alcohol use: No     Drug use: No     Sexual activity: Yes     Partners: Female     Birth control/protection: Surgical   Other Topics Concern     Not on file   Social History Narrative    [employer]oks at restaurant  No exercise  Caffeine: rare       Social Determinants of Health     Financial Resource Strain: Not on file   Food Insecurity: Not on file   Transportation Needs: Not on file   Physical Activity: Not on file   Stress: Not on file   Social Connections: Not on file   Intimate Partner Violence: Not on file   Housing Stability: Not on file           Allergies   Allergen Reactions     Penicillins Shortness Of Breath and Swelling     Dilaudid [Hydromorphone] Dizziness          Current Outpatient Medications   Medication     albuterol (VENTOLIN HFA) 108 (90 Base) MCG/ACT inhaler     cyclobenzaprine (FLEXERIL) 5 MG tablet     esomeprazole (NEXIUM 24HR) 20 MG DR capsule     ibuprofen (ADVIL,MOTRIN) 200 MG tablet     levothyroxine (SYNTHROID/LEVOTHROID) 200 MCG tablet     predniSONE (DELTASONE) 20 MG tablet     No current facility-administered medications for this visit.            /72    Pulse 73   Ht 1.829 m (6')   Wt 88 kg (194 lb)   SpO2 97%   BMI 26.31 kg/m       No results found for this or any previous visit (from the past 240 hour(s)).     Physical Exam:     Physical Exam  Vitals and nursing note reviewed.   Constitutional:       Appearance: Normal appearance.   HENT:      Head: Normocephalic and atraumatic.   Eyes:      Conjunctiva/sclera: Conjunctivae normal.   Cardiovascular:      Rate and Rhythm: Normal rate.      Heart sounds: No murmur heard.    No friction rub. No gallop.   Pulmonary:      Effort: Pulmonary effort is normal. No respiratory distress.      Breath sounds: No wheezing, rhonchi or rales.   Musculoskeletal:      Cervical back: Normal and neck supple.      Thoracic back: Normal.      Lumbar back: Normal. No tenderness or bony tenderness. Normal range of motion.      Comments: No cervical, thoracic or lumbar tenderness.  No rashes noted over the spine throughout.    Pain to the paravertebral muscles on the left to the lumbar spine.  He does have some tenderness over the piriformis muscle on the left.  Positive straight leg raise at 30 degrees.  No weakness to the lower extremity.  No other red flags noted on exam.  Shannon reflex 2+.   Neurological:      Mental Status: He is alert.      Deep Tendon Reflexes:      Reflex Scores:       Patellar reflexes are 2+ on the right side and 2+ on the left side.             Assessment/Plan:       ICD-10-CM    1. Chronic right-sided low back pain with left-sided sciatica  M54.42 predniSONE (DELTASONE) 20 MG tablet    G89.29 cyclobenzaprine (FLEXERIL) 5 MG tablet     #1 left sciatica-Bill exam he has no cervical thoracic or lumbar tenderness.  He does have some mild tenderness to the paravertebral muscles of the lumbar spine.  Majority of his pain is over the piriformis muscle.  He did have a positive straight leg raise at 30 degrees.  No bowel or bladder issues.  I do suspect that this is sciatic in nature.  I will start him on  prednisone 40 mg for 5 days along with Flexeril.  Side effects of the medication were discussed.  I did recommend not taking the Flexeril if he is going to be driving or working due to sedative side effects.  I also recommended continuing with Tylenol and ibuprofen ice and heat.  Also recommended massaging over the piriformis muscle.  No red flags noted on exam.  I do not think we need imaging at this time.  If symptoms are not improving I do recommend trying some physical therapy.  To follow-up if symptoms worsen or do not improve.  Patient agreed this plan.  Questions were        [place] PA-C

## 2022-06-15 DIAGNOSIS — E03.9 HYPOTHYROIDISM, UNSPECIFIED TYPE: ICD-10-CM

## 2022-06-16 RX ORDER — LEVOTHYROXINE SODIUM 175 UG/1
TABLET ORAL
Qty: 48 TABLET | Refills: 0 | OUTPATIENT
Start: 2022-06-16

## 2022-06-16 NOTE — TELEPHONE ENCOUNTER
Refill denied.   Levothyroxine 175mcg was discontinued 12/29/21.     Renee Nuñez RN  [facility] Nurse Advisor  06/16/22  12:47 AM

## 2022-09-13 ENCOUNTER — OFFICE VISIT (OUTPATIENT)
Dept: FAMILY MEDICINE | Facility: CLINIC | Age: 57
End: 2022-09-13
Payer: COMMERCIAL

## 2022-09-13 VITALS
BODY MASS INDEX: 25.36 KG/M2 | DIASTOLIC BLOOD PRESSURE: 94 MMHG | HEART RATE: 72 BPM | WEIGHT: 187 LBS | SYSTOLIC BLOOD PRESSURE: 140 MMHG | OXYGEN SATURATION: 98 %

## 2022-09-13 DIAGNOSIS — K43.2 INCISIONAL HERNIA, WITHOUT OBSTRUCTION OR GANGRENE: Primary | ICD-10-CM

## 2022-09-13 DIAGNOSIS — C43.59 MALIGNANT MELANOMA OF SKIN OF TRUNK, EXCEPT SCROTUM (H): ICD-10-CM

## 2022-09-13 DIAGNOSIS — E21.0 PRIMARY HYPERPARATHYROIDISM (H): ICD-10-CM

## 2022-09-13 PROCEDURE — 90682 RIV4 VACC RECOMBINANT DNA IM: CPT | Performed by: FAMILY MEDICINE

## 2022-09-13 PROCEDURE — 90471 IMMUNIZATION ADMIN: CPT | Performed by: FAMILY MEDICINE

## 2022-09-13 PROCEDURE — 99213 OFFICE O/P EST LOW 20 MIN: CPT | Mod: 25 | Performed by: FAMILY MEDICINE

## 2022-09-13 ASSESSMENT — ASTHMA QUESTIONNAIRES: ACT_TOTALSCORE: 23

## 2022-09-13 NOTE — PROGRESS NOTES
Assessment & Plan     Incisional hernia, without obstruction or gangrene  Patient is getting a CAT scan for cancer follow-up next week which should provide imaging for this ovarian hernia  - Adult General Surg Referral               BMI:   Estimated body mass index is 25.36 kg/m  as calculated from the following:    Height as of 3/29/22: 1.829 m (6').    Weight as of this encounter: 84.8 kg (187 lb).           No follow-ups on file.    Kane Pteersen MD  [facility]    Haile Braga is a 56 year old, presenting for the following health issues:  Tony Buenrostro presents with a swelling in his right lower right upper quadrant at the juncture previous site of a laparoscopic incision for subtotal colectomy for diverticulosis.  Patient does lift heavy objects sometimes up to 100 pounds at his job.  For a month or 2 is noticed a swelling in the juncture of the right and left lower quadrants measuring 3 x 5 cm.  It is painful I do notice it when he squats also on being in the prone position I do not notice any inguinal swelling.  Impression is incisional hernia symptomatic.  We will get surgical opinion from general surgery.  He is getting a CT scanning of his lower abdomen for follow-up of his oncology and diverticulosis and colectomy surgery.  Past medical history significant for Graves' disease but under good control.  Follow along with his primary care physician  HPI           Review of Systems   Constitutional, HEENT, cardiovascular, pulmonary, gi and gu systems are negative, except as otherwise noted.      Objective    BP (!) 140/94   Pulse 72   Wt 84.8 kg (187 lb)   SpO2 98%   BMI 25.36 kg/m    Body mass index is 25.36 kg/m .  Physical Exam   GENERAL: healthy, alert and no distress  NECK: no adenopathy, no asymmetry, masses, or scars and thyroid normal to palpation  RESP: lungs clear to auscultation - no rales, rhonchi or wheezes  CV: regular rate and rhythm, normal S1 S2, no S3 or S4,  no murmur, click or rub, no peripheral edema and peripheral pulses strong  ABDOMEN: soft, nontender, no hepatosplenomegaly, no masses and bowel sounds normal; patient has a bulging mass 3 x 5 cm tender at the juncture of right upper quadrant and right lower quadrant mid clavicular line into abdomen  MS: no gross musculoskeletal defects noted, no edema

## 2022-09-19 ENCOUNTER — HOSPITAL ENCOUNTER (OUTPATIENT)
Dept: CT IMAGING | Facility: CLINIC | Age: 57
Discharge: HOME | End: 2022-09-19
Attending: INTERNAL MEDICINE | Admitting: INTERNAL MEDICINE
Payer: COMMERCIAL

## 2022-09-19 DIAGNOSIS — C43.59 MALIGNANT MELANOMA OF SKIN OF TRUNK, EXCEPT SCROTUM (H): ICD-10-CM

## 2022-09-19 PROCEDURE — 250N000011 HC RX IP 250 OP 636: Performed by: INTERNAL MEDICINE

## 2022-09-19 PROCEDURE — 74177 CT ABD & PELVIS W/CONTRAST: CPT

## 2022-09-19 RX ORDER — IOPAMIDOL 755 MG/ML
100 INJECTION, SOLUTION INTRAVASCULAR ONCE
Status: COMPLETED | OUTPATIENT
Start: 2022-09-19 | End: 2022-09-19

## 2022-09-19 RX ADMIN — IOPAMIDOL 100 ML: 755 INJECTION, SOLUTION INTRAVENOUS at 13:21

## 2022-09-26 ENCOUNTER — LAB (OUTPATIENT)
Dept: INFUSION THERAPY | Facility: CLINIC | Age: 57
End: 2022-09-26
Attending: INTERNAL MEDICINE
Payer: COMMERCIAL

## 2022-09-26 ENCOUNTER — ONCOLOGY VISIT (OUTPATIENT)
Dept: ONCOLOGY | Facility: CLINIC | Age: 57
End: 2022-09-26
Attending: INTERNAL MEDICINE
Payer: COMMERCIAL

## 2022-09-26 VITALS
WEIGHT: 189.9 LBS | DIASTOLIC BLOOD PRESSURE: 88 MMHG | OXYGEN SATURATION: 98 % | BODY MASS INDEX: 25.76 KG/M2 | RESPIRATION RATE: 16 BRPM | SYSTOLIC BLOOD PRESSURE: 132 MMHG | HEART RATE: 76 BPM

## 2022-09-26 DIAGNOSIS — C43.59 MALIGNANT MELANOMA OF SKIN OF TRUNK, EXCEPT SCROTUM (H): ICD-10-CM

## 2022-09-26 DIAGNOSIS — C43.59 MALIGNANT MELANOMA OF SKIN OF TRUNK, EXCEPT SCROTUM (H): Primary | ICD-10-CM

## 2022-09-26 LAB
ALBUMIN SERPL-MCNC: 3.7 G/DL (ref 3.5–5)
ALP SERPL-CCNC: 68 U/L (ref 45–120)
ALT SERPL W P-5'-P-CCNC: 18 U/L (ref 0–45)
ANION GAP SERPL CALCULATED.3IONS-SCNC: 9 MMOL/L (ref 5–18)
AST SERPL W P-5'-P-CCNC: 19 U/L (ref 0–40)
BASOPHILS # BLD AUTO: 0 10E3/UL (ref 0–0.2)
BASOPHILS NFR BLD AUTO: 1 %
BILIRUB SERPL-MCNC: 0.4 MG/DL (ref 0–1)
BUN SERPL-MCNC: 16 MG/DL (ref 8–22)
CALCIUM SERPL-MCNC: 9.3 MG/DL (ref 8.5–10.5)
CHLORIDE BLD-SCNC: 103 MMOL/L (ref 98–107)
CO2 SERPL-SCNC: 25 MMOL/L (ref 22–31)
CREAT SERPL-MCNC: 1.06 MG/DL (ref 0.7–1.3)
EOSINOPHIL # BLD AUTO: 0.3 10E3/UL (ref 0–0.7)
EOSINOPHIL NFR BLD AUTO: 4 %
ERYTHROCYTE [DISTWIDTH] IN BLOOD BY AUTOMATED COUNT: 13.1 % (ref 10–15)
GFR SERPL CREATININE-BSD FRML MDRD: 83 ML/MIN/1.73M2
GLUCOSE BLD-MCNC: 112 MG/DL (ref 70–125)
HCT VFR BLD AUTO: 43.2 % (ref 40–53)
HGB BLD-MCNC: 14.7 G/DL (ref 13.3–17.7)
IMM GRANULOCYTES # BLD: 0 10E3/UL
IMM GRANULOCYTES NFR BLD: 0 %
LYMPHOCYTES # BLD AUTO: 1.7 10E3/UL (ref 0.8–5.3)
LYMPHOCYTES NFR BLD AUTO: 26 %
MCH RBC QN AUTO: 28.8 PG (ref 26.5–33)
MCHC RBC AUTO-ENTMCNC: 34 G/DL (ref 31.5–36.5)
MCV RBC AUTO: 85 FL (ref 78–100)
MONOCYTES # BLD AUTO: 0.4 10E3/UL (ref 0–1.3)
MONOCYTES NFR BLD AUTO: 6 %
NEUTROPHILS # BLD AUTO: 4 10E3/UL (ref 1.6–8.3)
NEUTROPHILS NFR BLD AUTO: 63 %
NRBC # BLD AUTO: 0 10E3/UL
NRBC BLD AUTO-RTO: 0 /100
PLATELET # BLD AUTO: 253 10E3/UL (ref 150–450)
POTASSIUM BLD-SCNC: 4 MMOL/L (ref 3.5–5)
PROT SERPL-MCNC: 7.7 G/DL (ref 6–8)
RBC # BLD AUTO: 5.11 10E6/UL (ref 4.4–5.9)
SODIUM SERPL-SCNC: 137 MMOL/L (ref 136–145)
WBC # BLD AUTO: 6.4 10E3/UL (ref 4–11)

## 2022-09-26 PROCEDURE — 99214 OFFICE O/P EST MOD 30 MIN: CPT | Performed by: INTERNAL MEDICINE

## 2022-09-26 PROCEDURE — 85014 HEMATOCRIT: CPT

## 2022-09-26 PROCEDURE — 80053 COMPREHEN METABOLIC PANEL: CPT

## 2022-09-26 PROCEDURE — G0463 HOSPITAL OUTPT CLINIC VISIT: HCPCS

## 2022-09-26 PROCEDURE — 36415 COLL VENOUS BLD VENIPUNCTURE: CPT

## 2022-09-26 ASSESSMENT — PAIN SCALES - GENERAL: PAINLEVEL: NO PAIN (1)

## 2022-09-26 NOTE — LETTER
9/26/2022         RE: Omi Grimm  [address] [facility] [address]        Dear Colleague,    Thank you for referring your patient, Omi Grimm, to the [facility]. Please see a copy of my visit note below.    Oncology Rooming Note    September 26, 2022 2:41 PM   Omi Grimm is a 56 year old male who presents for:    Chief Complaint   Patient presents with     Oncology Clinic Visit     Malignant Melanoma Of The Back     Initial Vitals: /88   Pulse 76   Resp 16   Wt 86.1 kg (189 lb 14.4 oz)   SpO2 98%   BMI 25.76 kg/m   Estimated body mass index is 25.76 kg/m  as calculated from the following:    Height as of 3/29/22: 1.829 m (6').    Weight as of this encounter: 86.1 kg (189 lb 14.4 oz). Body surface area is 2.09 meters squared.  No Pain (1) Comment: Data Unavailable   No LMP for male patient.  Allergies reviewed: Yes  Medications reviewed: Yes    Medications: Medication refills not needed today.  Pharmacy name entered into [employer]:    [facility] - [place], [place] - [address]  [id number] - [place], [place] - [address]    Clinical concerns:       Cindy Allen              [facility] Hematology and Oncology Progress Note    Patient: Omi Grimm  MRN: [id number]  Date of Service: Sep 26, 2022        Assessment and Plan:    1.  Melanoma: He is now just over 8 years out from his definitive treatment.    He recently had a CT scan of the body.  These images were personally reviewed and shared with Omi.  There is no evidence of recurrent melanoma or other cancer for that matter.  His labs were reviewed.  CBC shows normal white count at 6.4, hemoglobin 14.7, and platelets at 253,000.  Chemistries were reviewed and were all normal with creatinine of 1.06, calcium 9.3, sodium 137.  He continues to follow routinely with dermatology.  We will see him back in a year with repeat imaging and labs.  Questions were  answered.    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 Stage [id number]. IIC. Diagnosed June, 2014.     Treatment:    Wide excision and sentinel node biopsy in June 5, 2014. No adjuvant therapy was given.    Interim History:    Review returns today for a follow-up visit.  He has been doing well since his last visit.  Recently diagnosed with an abdominal wall hernia from which she is intermittently symptomatic.  No acute complaints today.    Review of Systems:    As above in the history.     Review of Systems otherwise Negative for:  General: chills, fever or night sweats  Psychological: anxiety or depression  Ophthalmic: blurry vision, double vision or loss of vision, vision change  ENT: epistaxis, oral lesions, hearing changes  Hematological and Lymphatic: bleeding, bruising, jaundice, swollen lymph nodes  Endocrine: hot flashes, unexpected weight changes  Respiratory: cough, hemoptysis, orthopnea or shortness of breath/BUTT  Cardiovascular: chest pain, edema, palpitations or PND  Gastrointestinal: abdominal pain, blood in stools, change in bowel habits, constipation, diarrhea or nausea/vomiting  Genito-Urinary: change in urinary stream, incontinence, frequency/urgency  Musculoskeletal: joint pain, stiffness, swelling, muscle pain  Neurological: dizziness, headaches, numbness/tingling  Dermatological: lumps and rash    Past History:    Past Medical History:   Diagnosis Date     Acute bronchitis      Allergic rhinitis      Asthma      Diverticulitis      Graves disease      Hypertension      Kidney stone 11/06/2017    first stone at age 52     Malignant melanoma of skin of trunk, except scrotum (H)     removed from back 2015     PONV (postoperative nausea and vomiting)      Unspecified hypothyroidism      Physical Exam:    /88   Pulse 76   Resp 16   Wt 86.1 kg (189 lb 14.4 oz)   SpO2 98%   BMI 25.76 kg/m      General: patient appears stated age of 55  year old. Nontoxic and in no distress.   HEENT: Head: atraumatic, normocephalic. Sclerae anicteric.  Chest:  Normal respiratory effort  Cardiac:  No edema.   Abdomen: abdomen is non-distended  Extremities: normal tone and muscle bulk.  Skin: no lesions or rash on visible skin. Warm and dry.   CNS: alert and oriented. Grossly non-focal.   Psychiatric: normal mood and affect.     Lab Results:    Recent Results (from the past 168 hour(s))   Comprehensive metabolic panel   Result Value Ref Range    Sodium 137 136 - 145 mmol/L    Potassium 4.0 3.5 - 5.0 mmol/L    Chloride 103 98 - 107 mmol/L    Carbon Dioxide (CO2) 25 22 - 31 mmol/L    Anion Gap 9 5 - 18 mmol/L    Urea Nitrogen 16 8 - 22 mg/dL    Creatinine 1.06 0.70 - 1.30 mg/dL    Calcium 9.3 8.5 - 10.5 mg/dL    Glucose 112 70 - 125 mg/dL    Alkaline Phosphatase 68 45 - 120 U/L    AST 19 0 - 40 U/L    ALT 18 0 - 45 U/L    Protein Total 7.7 6.0 - 8.0 g/dL    Albumin 3.7 3.5 - 5.0 g/dL    Bilirubin Total 0.4 0.0 - 1.0 mg/dL    GFR Estimate 83 >60 mL/min/1.73m2   CBC with platelets and differential   Result Value Ref Range    WBC Count 6.4 4.0 - 11.0 10e3/uL    RBC Count 5.11 4.40 - 5.90 10e6/uL    Hemoglobin 14.7 13.3 - 17.7 g/dL    Hematocrit 43.2 40.0 - 53.0 %    MCV 85 78 - 100 fL    MCH 28.8 26.5 - 33.0 pg    MCHC 34.0 31.5 - 36.5 g/dL    RDW 13.1 10.0 - 15.0 %    Platelet Count 253 150 - 450 10e3/uL    % Neutrophils 63 %    % Lymphocytes 26 %    % Monocytes 6 %    % Eosinophils 4 %    % Basophils 1 %    % Immature Granulocytes 0 %    NRBCs per 100 WBC 0 <1 /100    Absolute Neutrophils 4.0 1.6 - 8.3 10e3/uL    Absolute Lymphocytes 1.7 0.8 - 5.3 10e3/uL    Absolute Monocytes 0.4 0.0 - 1.3 10e3/uL    Absolute Eosinophils 0.3 0.0 - 0.7 10e3/uL    Absolute Basophils 0.0 0.0 - 0.2 10e3/uL    Absolute Immature Granulocytes 0.0 <=0.4 10e3/uL    Absolute NRBCs 0.0 10e3/uL     Imaging:    CT Chest/Abdomen/Pelvis w Contrast    Result Date: 9/19/2022  EXAM: CT  CHEST/ABDOMEN/PELVIS W CONTRAST LOCATION: [facility] DATE/TIME: 9/19/2022 1:24 PM INDICATION:  Malignant melanoma of skin of trunk, except scrotum (H) COMPARISON: 09/27/2021 TECHNIQUE: CT scan of the chest, abdomen, and pelvis was performed following injection of IV contrast. Multiplanar reformats were obtained. Dose reduction techniques were used. CONTRAST: 100ml Isovue 370 FINDINGS: LUNGS AND PLEURA: Apical scarring. MEDIASTINUM/AXILLAE: Small hiatal hernia. CORONARY ARTERY CALCIFICATION: None. HEPATOBILIARY: Cholelithiasis. Hepatic steatosis. PANCREAS: Normal. SPLEEN: Normal. ADRENAL GLANDS: Normal. KIDNEYS/BLADDER: Normal. BOWEL: Normal appendix. There is colonic diverticulosis. A distal sigmoid suture line is present. LYMPH NODES: Normal. VASCULATURE: Unremarkable. PELVIC ORGANS: Vasectomy. MUSCULOSKELETAL: Tiny sclerotic bone lesions have not changed. Focal skin thickening and subcutaneous fat stranding in the posterior lower thorax to the left of midline has not changed (series 3 image 59).     IMPRESSION: 1.  Skin thickening of the posterior lower chest wall has not changed. No evidence of new malignancy in the chest, abdomen, or pelvis.  2.  Distal sigmoid suture line again seen. There is colonic diverticulosis. 3.  Hepatic steatosis. 4.  Cholelithiasis.      Signed by: Kane Mann MD        Again, thank you for allowing me to participate in the care of your patient.        Sincerely,        Kane Mann MD

## 2022-09-26 NOTE — PROGRESS NOTES
[facility] Hematology and Oncology Progress Note    Patient: Omi Grimm  MRN: [id number]  Date of Service: Sep 26, 2022        Assessment and Plan:    1.  Melanoma: He is now just over 8 years out from his definitive treatment.    He recently had a CT scan of the body.  These images were personally reviewed and shared with Omi.  There is no evidence of recurrent melanoma or other cancer for that matter.  His labs were reviewed.  CBC shows normal white count at 6.4, hemoglobin 14.7, and platelets at 253,000.  Chemistries were reviewed and were all normal with creatinine of 1.06, calcium 9.3, sodium 137.  He continues to follow routinely with dermatology.  We will see him back in a year with repeat imaging and labs.  Questions were answered.    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 Stage [id number]. IIC. Diagnosed June, 2014.     Treatment:    Wide excision and sentinel node biopsy in June 5, 2014. No adjuvant therapy was given.    Interim History:    Review returns today for a follow-up visit.  He has been doing well since his last visit.  Recently diagnosed with an abdominal wall hernia from which she is intermittently symptomatic.  No acute complaints today.    Review of Systems:    As above in the history.     Review of Systems otherwise Negative for:  General: chills, fever or night sweats  Psychological: anxiety or depression  Ophthalmic: blurry vision, double vision or loss of vision, vision change  ENT: epistaxis, oral lesions, hearing changes  Hematological and Lymphatic: bleeding, bruising, jaundice, swollen lymph nodes  Endocrine: hot flashes, unexpected weight changes  Respiratory: cough, hemoptysis, orthopnea or shortness of breath/BUTT  Cardiovascular: chest pain, edema, palpitations or PND  Gastrointestinal: abdominal pain, blood in stools, change in bowel habits, constipation, diarrhea or nausea/vomiting  Genito-Urinary: change  in urinary stream, incontinence, frequency/urgency  Musculoskeletal: joint pain, stiffness, swelling, muscle pain  Neurological: dizziness, headaches, numbness/tingling  Dermatological: lumps and rash    Past History:    Past Medical History:   Diagnosis Date     Acute bronchitis      Allergic rhinitis      Asthma      Diverticulitis      Graves disease      Hypertension      Kidney stone 11/06/2017    first stone at age 52     Malignant melanoma of skin of trunk, except scrotum (H)     removed from back 2015     PONV (postoperative nausea and vomiting)      Unspecified hypothyroidism      Physical Exam:    /88   Pulse 76   Resp 16   Wt 86.1 kg (189 lb 14.4 oz)   SpO2 98%   BMI 25.76 kg/m      General: patient appears stated age of 55 year old. Nontoxic and in no distress.   HEENT: Head: atraumatic, normocephalic. Sclerae anicteric.  Chest:  Normal respiratory effort  Cardiac:  No edema.   Abdomen: abdomen is non-distended  Extremities: normal tone and muscle bulk.  Skin: no lesions or rash on visible skin. Warm and dry.   CNS: alert and oriented. Grossly non-focal.   Psychiatric: normal mood and affect.     Lab Results:    Recent Results (from the past 168 hour(s))   Comprehensive metabolic panel   Result Value Ref Range    Sodium 137 136 - 145 mmol/L    Potassium 4.0 3.5 - 5.0 mmol/L    Chloride 103 98 - 107 mmol/L    Carbon Dioxide (CO2) 25 22 - 31 mmol/L    Anion Gap 9 5 - 18 mmol/L    Urea Nitrogen 16 8 - 22 mg/dL    Creatinine 1.06 0.70 - 1.30 mg/dL    Calcium 9.3 8.5 - 10.5 mg/dL    Glucose 112 70 - 125 mg/dL    Alkaline Phosphatase 68 45 - 120 U/L    AST 19 0 - 40 U/L    ALT 18 0 - 45 U/L    Protein Total 7.7 6.0 - 8.0 g/dL    Albumin 3.7 3.5 - 5.0 g/dL    Bilirubin Total 0.4 0.0 - 1.0 mg/dL    GFR Estimate 83 >60 mL/min/1.73m2   CBC with platelets and differential   Result Value Ref Range    WBC Count 6.4 4.0 - 11.0 10e3/uL    RBC Count 5.11 4.40 - 5.90 10e6/uL    Hemoglobin 14.7 13.3 - 17.7 g/dL     Hematocrit 43.2 40.0 - 53.0 %    MCV 85 78 - 100 fL    MCH 28.8 26.5 - 33.0 pg    MCHC 34.0 31.5 - 36.5 g/dL    RDW 13.1 10.0 - 15.0 %    Platelet Count 253 150 - 450 10e3/uL    % Neutrophils 63 %    % Lymphocytes 26 %    % Monocytes 6 %    % Eosinophils 4 %    % Basophils 1 %    % Immature Granulocytes 0 %    NRBCs per 100 WBC 0 <1 /100    Absolute Neutrophils 4.0 1.6 - 8.3 10e3/uL    Absolute Lymphocytes 1.7 0.8 - 5.3 10e3/uL    Absolute Monocytes 0.4 0.0 - 1.3 10e3/uL    Absolute Eosinophils 0.3 0.0 - 0.7 10e3/uL    Absolute Basophils 0.0 0.0 - 0.2 10e3/uL    Absolute Immature Granulocytes 0.0 <=0.4 10e3/uL    Absolute NRBCs 0.0 10e3/uL     Imaging:    CT Chest/Abdomen/Pelvis w Contrast    Result Date: 9/19/2022  EXAM: CT CHEST/ABDOMEN/PELVIS W CONTRAST LOCATION: [facility] DATE/TIME: 9/19/2022 1:24 PM INDICATION:  Malignant melanoma of skin of trunk, except scrotum (H) COMPARISON: 09/27/2021 TECHNIQUE: CT scan of the chest, abdomen, and pelvis was performed following injection of IV contrast. Multiplanar reformats were obtained. Dose reduction techniques were used. CONTRAST: 100ml Isovue 370 FINDINGS: LUNGS AND PLEURA: Apical scarring. MEDIASTINUM/AXILLAE: Small hiatal hernia. CORONARY ARTERY CALCIFICATION: None. HEPATOBILIARY: Cholelithiasis. Hepatic steatosis. PANCREAS: Normal. SPLEEN: Normal. ADRENAL GLANDS: Normal. KIDNEYS/BLADDER: Normal. BOWEL: Normal appendix. There is colonic diverticulosis. A distal sigmoid suture line is present. LYMPH NODES: Normal. VASCULATURE: Unremarkable. PELVIC ORGANS: Vasectomy. MUSCULOSKELETAL: Tiny sclerotic bone lesions have not changed. Focal skin thickening and subcutaneous fat stranding in the posterior lower thorax to the left of midline has not changed (series 3 image 59).     IMPRESSION: 1.  Skin thickening of the posterior lower chest wall has not changed. No evidence of new malignancy in the chest, abdomen, or pelvis.  2.  Distal sigmoid  suture line again seen. There is colonic diverticulosis. 3.  Hepatic steatosis. 4.  Cholelithiasis.      Signed by: Kane Mann MD

## 2022-09-26 NOTE — PROGRESS NOTES
Oncology Rooming Note    September 26, 2022 2:41 PM   Omi Grimm is a 56 year old male who presents for:    Chief Complaint   Patient presents with     Oncology Clinic Visit     Malignant Melanoma Of The Back     Initial Vitals: /88   Pulse 76   Resp 16   Wt 86.1 kg (189 lb 14.4 oz)   SpO2 98%   BMI 25.76 kg/m   Estimated body mass index is 25.76 kg/m  as calculated from the following:    Height as of 3/29/22: 1.829 m (6').    Weight as of this encounter: 86.1 kg (189 lb 14.4 oz). Body surface area is 2.09 meters squared.  No Pain (1) Comment: Data Unavailable   No LMP for male patient.  Allergies reviewed: Yes  Medications reviewed: Yes    Medications: Medication refills not needed today.  Pharmacy name entered into [facility]:    [facility] - [place], [place] - [address]  [id number] - [place], [place] - [address]    Clinical concerns:       Cindy Allen

## 2022-10-03 ENCOUNTER — OFFICE VISIT (OUTPATIENT)
Dept: SURGERY | Facility: CLINIC | Age: 57
End: 2022-10-03
Attending: FAMILY MEDICINE
Payer: COMMERCIAL

## 2022-10-03 DIAGNOSIS — K43.2 INCISIONAL HERNIA, WITHOUT OBSTRUCTION OR GANGRENE: ICD-10-CM

## 2022-10-03 PROCEDURE — 99213 OFFICE O/P EST LOW 20 MIN: CPT | Performed by: SURGERY

## 2022-10-03 RX ORDER — CLINDAMYCIN PHOSPHATE 900 MG/50ML
900 INJECTION, SOLUTION INTRAVENOUS SEE ADMIN INSTRUCTIONS
Status: CANCELLED | OUTPATIENT
Start: 2022-12-22

## 2022-10-03 RX ORDER — CLINDAMYCIN PHOSPHATE 900 MG/50ML
900 INJECTION, SOLUTION INTRAVENOUS
Status: CANCELLED | OUTPATIENT
Start: 2022-12-22

## 2022-10-03 NOTE — LETTER
10/3/2022         RE: Omi Grimm  [address]  [facility] [address]        Dear Colleague,    Thank you for referring your patient, Omi Grimm, to the [facility] [place]. Please see a copy of my visit note below.     HPI: Omi Grimm is here for follow up for a possible right-sided abdominal wall incisional hernia.  He is doing well from his robotic sigmoid colon resection several years ago but developed a bulge in the right lower quadrant extraction site incision.  Since then he has noted that the bulge is enlarging.  He underwent CT imaging by his oncologist and was noted to have an incisional hernia.  Denies any other symptoms such as fever chills nausea vomiting.    Allergies, Medications, Social History, Past Medical History and Past Surgical History were reviewed and are noted in the chart.    There were no vitals taken for this visit.  There is no height or weight on file to calculate BMI.      EXAM:   GENERAL: Appears well  Abdomen-palpable right lower quadrant incisional hernia with reducible small bowel    CT scan images reviewed demonstrate small incisional hernia       Assessment/Plan: Omi Grimm has an incisional hernia at his right lower quadrant extraction site.  We discussed options including robotic repair which would be fairly straightforward.  Risk and benefits of the procedure as well as activity modification after the procedure were discussed.  He would like to consider his options and will discuss with his wife.  When he is ready to schedule he will call back in the clinic.    Jefferson Rodriguez DO [facility] Department of Surgery      Again, thank you for allowing me to participate in the care of your patient.        Sincerely,        Jefferson Rodriguez DO

## 2022-10-03 NOTE — PROGRESS NOTES
HPI: Omi Grimm is here for follow up for a possible right-sided abdominal wall incisional hernia.  He is doing well from his robotic sigmoid colon resection several years ago but developed a bulge in the right lower quadrant extraction site incision.  Since then he has noted that the bulge is enlarging.  He underwent CT imaging by his oncologist and was noted to have an incisional hernia.  Denies any other symptoms such as fever chills nausea vomiting.    Allergies, Medications, Social History, Past Medical History and Past Surgical History were reviewed and are noted in the chart.    There were no vitals taken for this visit.  There is no height or weight on file to calculate BMI.      EXAM:   GENERAL: Appears well  Abdomen-palpable right lower quadrant incisional hernia with reducible small bowel    CT scan images reviewed demonstrate small incisional hernia       Assessment/Plan: Omi Grimm has an incisional hernia at his right lower quadrant extraction site.  We discussed options including robotic repair which would be fairly straightforward.  Risk and benefits of the procedure as well as activity modification after the procedure were discussed.  He would like to consider his options and will discuss with his wife.  When he is ready to schedule he will call back in the clinic.    Jefferson Rodriguez DO [facility] Department of Surgery

## 2022-10-10 ENCOUNTER — TELEPHONE (OUTPATIENT)
Dept: SURGERY | Facility: CLINIC | Age: 57
End: 2022-10-10

## 2022-10-10 NOTE — TELEPHONE ENCOUNTER
Spoke with patient today regarding surgery scheduling     Went over details/instructions.    Surgery Letter sent via [employer]

## 2022-10-10 NOTE — LETTER
We've received instruction to get you scheduled for surgery with Dr Rodriguez. We have that arranged as follows:     Pre-op Physical:  Dr. Rodriguez will do your pre op physical the day of surgery    Surgery Date: 12/22/2022     Location: [facility].  [address] [place] [address]    Approximate Arrival Time: 7:00 am  (Unless instructed differently by the pre-op call nurse)     Post op Appointment: [facility]/2023 [facility] 10[facility] [address] [place] [address] [address] [place] [address].    Prep Tasks and Info:     1. Review your medications with your primary care or prescribing physician; they will advise you which meds to stop and when, and when you can resume taking.  Certain medications like blood thinners need to be stopped in advance of surgery to proceed safely.    2. You must get tested for COVID-19, even if you are vaccinated.    Outpatient Surgery:  If you are going home the same day of surgery, a home rapid antigen Covid-19 test is required 1-2 days before surgery- regardless of your vaccination status.  Take a photo of the negative result and show to the nurse on the day of surgery. If you test positive, contact our office right away to reschedule surgery. You can buy a home Covid-19 Rapid Antigen test at many local pharmacies, or you can order for free at covid.gov/tests.    3. Please shower the evening before and morning of surgery with Hibiclens or Exidine soap.  This can be found at your local pharmacy.    4. Fasting instructions will be provided by the pre-op nurse who will call you 1-3 days before surgery.  Typically we advise normal food up to 8 hours before surgery then clear liquids only up to 1 hour before surgery then nothing at all by mouth for 2 hours including no gum or candy.  The nurse will review your specific instructions with you at the call.      5. Smoking impacts your body's ability to heal properly.  If you are a  smoker, we strongly urge you to stop smoking 4-6 weeks before surgery. Plastic surgery patients are required to be nicotine free for 6-8 weeks before surgery.     6. You will need an adult to drive you home and stay with you 24 hours after surgery. Public transportation or Medical Van Services are not permitted.    7. You may have one family member wait in the lobby at the surgery center during your surgery. Visitor restrictions are subject to change, please verify with the pre-op nurse when they call.    8. If the community sees a new COVID19 surge, your procedure may need to be postponed. We will contact you if this happens. You will be screened for high-risk exposure to Covid-19 during the pre-op call.  We encourage you to quarantine yourself away from any Covid-19 people for 10 days before surgery to avoid possible last minute cancellations.   When you arrive to the surgery center, you will again be screened for COVID19 symptoms. If you screen positive, your surgery will need to be postponed.    9. We always encourage you to notify your insurance any time you have medical tests or procedures scheduled including surgery. The number is usually right on the back of your insurance card. Please call [facility] Cost of Care at [phone] if you'd like a surgery quote.       Call our office if you have any questions! Thank you!

## 2022-12-12 ENCOUNTER — TELEPHONE (OUTPATIENT)
Dept: SURGERY | Facility: CLINIC | Age: 57
End: 2022-12-12

## 2022-12-12 NOTE — TELEPHONE ENCOUNTER
Reached out to Omi today to offer the date of 12/15 for his surgery due to a cancellation we had. Patient declined and stated that he will wait for the scheduled date of 12/22.

## 2022-12-21 ENCOUNTER — ANESTHESIA EVENT (OUTPATIENT)
Dept: SURGERY | Facility: HOSPITAL | Age: 57
End: 2022-12-21
Payer: COMMERCIAL

## 2022-12-22 ENCOUNTER — ANESTHESIA (OUTPATIENT)
Dept: SURGERY | Facility: HOSPITAL | Age: 57
End: 2022-12-22
Payer: COMMERCIAL

## 2022-12-22 ENCOUNTER — HOSPITAL ENCOUNTER (OUTPATIENT)
Facility: HOSPITAL | Age: 57
Discharge: HOME | End: 2022-12-22
Attending: SURGERY | Admitting: SURGERY
Payer: COMMERCIAL

## 2022-12-22 VITALS
BODY MASS INDEX: 24.83 KG/M2 | OXYGEN SATURATION: 99 % | DIASTOLIC BLOOD PRESSURE: 104 MMHG | RESPIRATION RATE: 11 BRPM | HEART RATE: 79 BPM | WEIGHT: 183.1 LBS | TEMPERATURE: 98.1 F | SYSTOLIC BLOOD PRESSURE: 168 MMHG

## 2022-12-22 DIAGNOSIS — K43.2 INCISIONAL HERNIA, WITHOUT OBSTRUCTION OR GANGRENE: Primary | ICD-10-CM

## 2022-12-22 PROCEDURE — 250N000025 HC SEVOFLURANE, PER MIN: Performed by: SURGERY

## 2022-12-22 PROCEDURE — C1781 MESH (IMPLANTABLE): HCPCS | Performed by: SURGERY

## 2022-12-22 PROCEDURE — 999N000127 HC STATISTIC PERIPHERAL IV START W US GUIDANCE

## 2022-12-22 PROCEDURE — 250N000009 HC RX 250: Performed by: NURSE ANESTHETIST, CERTIFIED REGISTERED

## 2022-12-22 PROCEDURE — 258N000003 HC RX IP 258 OP 636: Performed by: ANESTHESIOLOGY

## 2022-12-22 PROCEDURE — 272N000001 HC OR GENERAL SUPPLY STERILE: Performed by: SURGERY

## 2022-12-22 PROCEDURE — 250N000011 HC RX IP 250 OP 636: Performed by: NURSE ANESTHETIST, CERTIFIED REGISTERED

## 2022-12-22 PROCEDURE — 999N000141 HC STATISTIC PRE-PROCEDURE NURSING ASSESSMENT: Performed by: SURGERY

## 2022-12-22 PROCEDURE — 370N000017 HC ANESTHESIA TECHNICAL FEE, PER MIN: Performed by: SURGERY

## 2022-12-22 PROCEDURE — 258N000003 HC RX IP 258 OP 636: Performed by: NURSE ANESTHETIST, CERTIFIED REGISTERED

## 2022-12-22 PROCEDURE — 250N000009 HC RX 250: Performed by: SURGERY

## 2022-12-22 PROCEDURE — 710N000012 HC RECOVERY PHASE 2, PER MINUTE: Performed by: SURGERY

## 2022-12-22 PROCEDURE — 250N000013 HC RX MED GY IP 250 OP 250 PS 637: Performed by: SURGERY

## 2022-12-22 PROCEDURE — 710N000009 HC RECOVERY PHASE 1, LEVEL 1, PER MIN: Performed by: SURGERY

## 2022-12-22 PROCEDURE — 250N000011 HC RX IP 250 OP 636: Performed by: ANESTHESIOLOGY

## 2022-12-22 PROCEDURE — 250N000011 HC RX IP 250 OP 636: Performed by: SURGERY

## 2022-12-22 PROCEDURE — 49654 PR LAP INCISIONAL HERNIA REPAIR: CPT | Performed by: SURGERY

## 2022-12-22 PROCEDURE — 250N000013 HC RX MED GY IP 250 OP 250 PS 637: Performed by: ANESTHESIOLOGY

## 2022-12-22 PROCEDURE — 250N000009 HC RX 250: Performed by: ANESTHESIOLOGY

## 2022-12-22 PROCEDURE — 360N000080 HC SURGERY LEVEL 7, PER MIN: Performed by: SURGERY

## 2022-12-22 PROCEDURE — S2900 ROBOTIC SURGICAL SYSTEM: HCPCS | Performed by: SURGERY

## 2022-12-22 DEVICE — MACROPOROUS MESH, MONOFILAMENT POLYPROPYLENE
Type: IMPLANTABLE DEVICE | Site: ABDOMEN | Status: FUNCTIONAL
Brand: PARIETENE

## 2022-12-22 RX ORDER — ACETAMINOPHEN 325 MG/1
975 TABLET ORAL ONCE
Status: COMPLETED | OUTPATIENT
Start: 2022-12-22 | End: 2022-12-22

## 2022-12-22 RX ORDER — PROPOFOL 10 MG/ML
INJECTION, EMULSION INTRAVENOUS PRN
Status: DISCONTINUED | OUTPATIENT
Start: 2022-12-22 | End: 2022-12-22

## 2022-12-22 RX ORDER — KETOROLAC TROMETHAMINE 30 MG/ML
INJECTION, SOLUTION INTRAMUSCULAR; INTRAVENOUS PRN
Status: DISCONTINUED | OUTPATIENT
Start: 2022-12-22 | End: 2022-12-22

## 2022-12-22 RX ORDER — KETAMINE HYDROCHLORIDE 10 MG/ML
INJECTION INTRAMUSCULAR; INTRAVENOUS PRN
Status: DISCONTINUED | OUTPATIENT
Start: 2022-12-22 | End: 2022-12-22

## 2022-12-22 RX ORDER — MAGNESIUM SULFATE 4 G/50ML
4 INJECTION INTRAVENOUS ONCE
Status: DISCONTINUED | OUTPATIENT
Start: 2022-12-22 | End: 2022-12-22 | Stop reason: HOSPADM

## 2022-12-22 RX ORDER — NALOXONE HYDROCHLORIDE 1 MG/ML
0.4 INJECTION INTRAMUSCULAR; INTRAVENOUS; SUBCUTANEOUS
Status: DISCONTINUED | OUTPATIENT
Start: 2022-12-22 | End: 2022-12-22 | Stop reason: HOSPADM

## 2022-12-22 RX ORDER — LIDOCAINE 40 MG/G
CREAM TOPICAL
Status: DISCONTINUED | OUTPATIENT
Start: 2022-12-22 | End: 2022-12-22 | Stop reason: HOSPADM

## 2022-12-22 RX ORDER — FENTANYL CITRATE 50 UG/ML
50 INJECTION, SOLUTION INTRAMUSCULAR; INTRAVENOUS
Status: DISCONTINUED | OUTPATIENT
Start: 2022-12-22 | End: 2022-12-22 | Stop reason: HOSPADM

## 2022-12-22 RX ORDER — OXYCODONE HYDROCHLORIDE 5 MG/1
5 TABLET ORAL ONCE
Status: COMPLETED | OUTPATIENT
Start: 2022-12-22 | End: 2022-12-22

## 2022-12-22 RX ORDER — DEXAMETHASONE SODIUM PHOSPHATE 10 MG/ML
INJECTION, SOLUTION INTRAMUSCULAR; INTRAVENOUS PRN
Status: DISCONTINUED | OUTPATIENT
Start: 2022-12-22 | End: 2022-12-22

## 2022-12-22 RX ORDER — NALOXONE HYDROCHLORIDE 1 MG/ML
0.2 INJECTION INTRAMUSCULAR; INTRAVENOUS; SUBCUTANEOUS
Status: DISCONTINUED | OUTPATIENT
Start: 2022-12-22 | End: 2022-12-22 | Stop reason: HOSPADM

## 2022-12-22 RX ORDER — ACETAMINOPHEN 325 MG/1
975 TABLET ORAL ONCE
Status: CANCELLED | OUTPATIENT
Start: 2022-12-22

## 2022-12-22 RX ORDER — ONDANSETRON 4 MG/1
4 TABLET, ORALLY DISINTEGRATING ORAL EVERY 30 MIN PRN
Status: DISCONTINUED | OUTPATIENT
Start: 2022-12-22 | End: 2022-12-22 | Stop reason: HOSPADM

## 2022-12-22 RX ORDER — ONDANSETRON 2 MG/ML
4 INJECTION INTRAMUSCULAR; INTRAVENOUS EVERY 30 MIN PRN
Status: DISCONTINUED | OUTPATIENT
Start: 2022-12-22 | End: 2022-12-22 | Stop reason: HOSPADM

## 2022-12-22 RX ORDER — CLINDAMYCIN PHOSPHATE 900 MG/50ML
900 INJECTION, SOLUTION INTRAVENOUS
Status: DISCONTINUED | OUTPATIENT
Start: 2022-12-22 | End: 2022-12-22 | Stop reason: HOSPADM

## 2022-12-22 RX ORDER — DOCUSATE SODIUM 100 MG/1
100 CAPSULE, LIQUID FILLED ORAL 2 TIMES DAILY
Qty: 30 CAPSULE | Refills: 0 | Status: SHIPPED | OUTPATIENT
Start: 2022-12-22 | End: 2023-03-09

## 2022-12-22 RX ORDER — MEPERIDINE HYDROCHLORIDE 25 MG/ML
12.5 INJECTION INTRAMUSCULAR; INTRAVENOUS; SUBCUTANEOUS
Status: DISCONTINUED | OUTPATIENT
Start: 2022-12-22 | End: 2022-12-22 | Stop reason: HOSPADM

## 2022-12-22 RX ORDER — OXYCODONE HYDROCHLORIDE 5 MG/1
5-10 TABLET ORAL EVERY 4 HOURS PRN
Qty: 10 TABLET | Refills: 0 | Status: SHIPPED | OUTPATIENT
Start: 2022-12-22 | End: 2023-03-09

## 2022-12-22 RX ORDER — FENTANYL CITRATE 50 UG/ML
50 INJECTION, SOLUTION INTRAMUSCULAR; INTRAVENOUS EVERY 5 MIN PRN
Status: DISCONTINUED | OUTPATIENT
Start: 2022-12-22 | End: 2022-12-22 | Stop reason: HOSPADM

## 2022-12-22 RX ORDER — FENTANYL CITRATE 50 UG/ML
25 INJECTION, SOLUTION INTRAMUSCULAR; INTRAVENOUS EVERY 5 MIN PRN
Status: DISCONTINUED | OUTPATIENT
Start: 2022-12-22 | End: 2022-12-22 | Stop reason: HOSPADM

## 2022-12-22 RX ORDER — HYDROCODONE BITARTRATE AND ACETAMINOPHEN 5; 325 MG/1; MG/1
1 TABLET ORAL
Status: DISCONTINUED | OUTPATIENT
Start: 2022-12-22 | End: 2022-12-22 | Stop reason: HOSPADM

## 2022-12-22 RX ORDER — CLINDAMYCIN PHOSPHATE 900 MG/50ML
900 INJECTION, SOLUTION INTRAVENOUS SEE ADMIN INSTRUCTIONS
Status: DISCONTINUED | OUTPATIENT
Start: 2022-12-22 | End: 2022-12-22 | Stop reason: HOSPADM

## 2022-12-22 RX ORDER — HALOPERIDOL 5 MG/ML
1 INJECTION INTRAMUSCULAR
Status: DISCONTINUED | OUTPATIENT
Start: 2022-12-22 | End: 2022-12-22 | Stop reason: HOSPADM

## 2022-12-22 RX ORDER — FENTANYL CITRATE 50 UG/ML
INJECTION, SOLUTION INTRAMUSCULAR; INTRAVENOUS PRN
Status: DISCONTINUED | OUTPATIENT
Start: 2022-12-22 | End: 2022-12-22

## 2022-12-22 RX ORDER — PROPOFOL 10 MG/ML
INJECTION, EMULSION INTRAVENOUS CONTINUOUS PRN
Status: DISCONTINUED | OUTPATIENT
Start: 2022-12-22 | End: 2022-12-22

## 2022-12-22 RX ORDER — GLYCOPYRROLATE 0.2 MG/ML
INJECTION, SOLUTION INTRAMUSCULAR; INTRAVENOUS PRN
Status: DISCONTINUED | OUTPATIENT
Start: 2022-12-22 | End: 2022-12-22

## 2022-12-22 RX ORDER — SODIUM CHLORIDE, SODIUM LACTATE, POTASSIUM CHLORIDE, CALCIUM CHLORIDE 600; 310; 30; 20 MG/100ML; MG/100ML; MG/100ML; MG/100ML
INJECTION, SOLUTION INTRAVENOUS CONTINUOUS
Status: DISCONTINUED | OUTPATIENT
Start: 2022-12-22 | End: 2022-12-22 | Stop reason: HOSPADM

## 2022-12-22 RX ORDER — BUPIVACAINE HYDROCHLORIDE 2.5 MG/ML
INJECTION, SOLUTION EPIDURAL; INFILTRATION; INTRACAUDAL PRN
Status: DISCONTINUED | OUTPATIENT
Start: 2022-12-22 | End: 2022-12-22 | Stop reason: HOSPADM

## 2022-12-22 RX ADMIN — FENTANYL CITRATE 50 MCG: 50 INJECTION, SOLUTION INTRAMUSCULAR; INTRAVENOUS at 10:56

## 2022-12-22 RX ADMIN — SODIUM CHLORIDE, POTASSIUM CHLORIDE, SODIUM LACTATE AND CALCIUM CHLORIDE: 600; 310; 30; 20 INJECTION, SOLUTION INTRAVENOUS at 07:29

## 2022-12-22 RX ADMIN — FENTANYL CITRATE 100 MCG: 50 INJECTION, SOLUTION INTRAMUSCULAR; INTRAVENOUS at 07:38

## 2022-12-22 RX ADMIN — KETAMINE HYDROCHLORIDE 20 MG: 10 INJECTION, SOLUTION INTRAMUSCULAR; INTRAVENOUS at 08:01

## 2022-12-22 RX ADMIN — ACETAMINOPHEN 975 MG: 325 TABLET ORAL at 13:06

## 2022-12-22 RX ADMIN — FENTANYL CITRATE 25 MCG: 50 INJECTION, SOLUTION INTRAMUSCULAR; INTRAVENOUS at 09:46

## 2022-12-22 RX ADMIN — KETOROLAC TROMETHAMINE 15 MG: 30 INJECTION, SOLUTION INTRAMUSCULAR at 09:09

## 2022-12-22 RX ADMIN — OXYCODONE HYDROCHLORIDE 5 MG: 5 TABLET ORAL at 10:17

## 2022-12-22 RX ADMIN — PHENYLEPHRINE HYDROCHLORIDE 100 MCG: 10 INJECTION INTRAVENOUS at 07:38

## 2022-12-22 RX ADMIN — GLYCOPYRROLATE 0.3 MG: 0.2 INJECTION, SOLUTION INTRAMUSCULAR; INTRAVENOUS at 07:38

## 2022-12-22 RX ADMIN — KETAMINE HYDROCHLORIDE 30 MG: 10 INJECTION, SOLUTION INTRAMUSCULAR; INTRAVENOUS at 07:54

## 2022-12-22 RX ADMIN — SUGAMMADEX 200 MG: 100 INJECTION, SOLUTION INTRAVENOUS at 09:09

## 2022-12-22 RX ADMIN — PROPOFOL 200 MCG/KG/MIN: 10 INJECTION, EMULSION INTRAVENOUS at 07:38

## 2022-12-22 RX ADMIN — SODIUM CHLORIDE, POTASSIUM CHLORIDE, SODIUM LACTATE AND CALCIUM CHLORIDE 100 ML/HR: 600; 310; 30; 20 INJECTION, SOLUTION INTRAVENOUS at 07:09

## 2022-12-22 RX ADMIN — ONDANSETRON 4 MG: 2 INJECTION INTRAMUSCULAR; INTRAVENOUS at 07:54

## 2022-12-22 RX ADMIN — PROPOFOL 200 MG: 10 INJECTION, EMULSION INTRAVENOUS at 07:38

## 2022-12-22 RX ADMIN — SODIUM CHLORIDE, POTASSIUM CHLORIDE, SODIUM LACTATE AND CALCIUM CHLORIDE: 600; 310; 30; 20 INJECTION, SOLUTION INTRAVENOUS at 08:03

## 2022-12-22 RX ADMIN — FENTANYL CITRATE 25 MCG: 50 INJECTION, SOLUTION INTRAMUSCULAR; INTRAVENOUS at 09:39

## 2022-12-22 RX ADMIN — CLINDAMYCIN IN 5 PERCENT DEXTROSE 900 MG: 18 INJECTION, SOLUTION INTRAVENOUS at 07:09

## 2022-12-22 RX ADMIN — DEXAMETHASONE SODIUM PHOSPHATE 10 MG: 10 INJECTION, SOLUTION INTRAMUSCULAR; INTRAVENOUS at 07:38

## 2022-12-22 RX ADMIN — LIDOCAINE HYDROCHLORIDE 3 ML: 10 INJECTION, SOLUTION EPIDURAL; INFILTRATION; INTRACAUDAL; PERINEURAL at 07:38

## 2022-12-22 RX ADMIN — MIDAZOLAM 2 MG: 1 INJECTION INTRAMUSCULAR; INTRAVENOUS at 07:35

## 2022-12-22 RX ADMIN — FENTANYL CITRATE 25 MCG: 50 INJECTION, SOLUTION INTRAMUSCULAR; INTRAVENOUS at 10:00

## 2022-12-22 RX ADMIN — ACETAMINOPHEN 975 MG: 325 TABLET ORAL at 07:09

## 2022-12-22 RX ADMIN — ROCURONIUM BROMIDE 50 MG: 50 INJECTION, SOLUTION INTRAVENOUS at 07:38

## 2022-12-22 RX ADMIN — FENTANYL CITRATE 25 MCG: 50 INJECTION, SOLUTION INTRAMUSCULAR; INTRAVENOUS at 09:54

## 2022-12-22 ASSESSMENT — ACTIVITIES OF DAILY LIVING (ADL)
ADLS_ACUITY_SCORE: 20

## 2022-12-22 NOTE — ANESTHESIA CARE TRANSFER NOTE
Patient: Omi Grimm    Procedure: Procedure(s):  HERNIORRHAPHY, INCISIONAL, ROBOT-ASSISTED, LAPAROSCOPIC, USING DA CHRI[facility] XI       Diagnosis: Incisional hernia, without obstruction or gangrene [K43.2]  Diagnosis Additional Information: No value filed.    Anesthesia Type:   General     Note:    Oropharynx: oropharynx clear of all foreign objects  Level of Consciousness: awake  Oxygen Supplementation: face mask  Level of Supplemental Oxygen (L/min / FiO2): 6  Independent Airway: airway patency satisfactory and stable  Dentition: dentition unchanged  Vital Signs Stable: post-procedure vital signs reviewed and stable  Report to RN Given: handoff report given  Patient transferred to: PACU    Handoff Report: Identifed the Patient, Identified the Reponsible Provider, Reviewed the pertinent medical history, Discussed the surgical course, Reviewed Intra-OP anesthesia mangement and issues during anesthesia, Set expectations for post-procedure period and Allowed opportunity for questions and acknowledgement of understanding      Vitals:  Vitals Value Taken Time   /82 12/22/22 0929   Temp 96.9    Pulse 73 12/22/22 0930   Resp 16 12/22/22 0930   SpO2 100 % 12/22/22 0930   Vitals shown include unvalidated device data.    Electronically Signed By: KIRA Corona CRNA  December 22, 2022  9:32 AM

## 2022-12-22 NOTE — OP NOTE
Name:  Omi Grimm  PCP:  Danuta Galicia  Procedure Date:  12/22/2022      Procedure(s):  HERNIORRHAPHY, INCISIONAL, ROBOT-ASSISTED, LAPAROSCOPIC, USING DA CHRIST XI    Pre-Procedure Diagnosis:  Incisional hernia, without obstruction or gangrene [K43.2]     Post-Procedure Diagnosis:    Incisional hernia    Surgeon(s):  Jefferson Rodriguez DO    Circulator: Di Sharp RN  Scrub Person: Stormy De Oliveira    Anesthesia Type: General      Findings:  5 x 5 cm incisional hernia with adherent small intestine    Operative Report:    The patient was taken the operating room placed in a supine position.  Endotracheal intubation was performed and the abdomen was prepped and draped sterile fashion.  Antibiotics were given prior to skin incision.  The table was flexed and the patient was rotated to the right.  I injected local anesthetic in left upper quadrant and made an 8 mm transverse incision.  I then establish pneumoperitoneum using a Veress needle technique.  Once this was done an 8 mm trocar with a 5 mm 30 degree camera was placed into the abdomen.  I scrutinized the surrounding structures for any injuries upon entry I did not find any.  I placed 2 additional left mid abdominal wall and epigastric trochars under direct visualization.  Both of these were robotic 8 mm trochars.  I then docked the robot and proceeded to begin the surgery.    I first evaluated the hernia defect.  This was approximately 5 x 5 cm.  There is evidence of adherent small intestine with adhesions.  These were taken down with sharp dissection.  Care was taken so as to preserve the integrity of the small intestine.  Once this was done I then created a retrorectus flap involving the transversalis fascia.  I was able to dissect out the hernia sac and come across portion of the transversus abdominis musculature creating a large peritoneal flap.   Once this was done I then evaluated the hernia defect.   This was noted to be 5 cm in its  greatest dimension.  I then reapproximated  the hernia defect with a 0 V Lock nonabsorbable suture.  Once this was complete I placed a 15 x 15 cm macro porous polypropylene soft mesh into the retrorectus space and sutured this circumferentially with 0 Ethibond sutures.  I then reapproximated the transversalis fascia and peritoneum to close the flap with a running absorbable 2-0 V Lock suture..  Once this was complete small peritoneal rents were oversewn with 0 2-0 suture.  I then undocked the robot and removed all trochars.  And I injected local anesthetic into all trocar sites and reapproximated the skin edges with 4-0 Monocryl suture.  The wounds were then cleaned and covered with dry sterile dressing including Steri-Strips and Band-Aids.  Cotton balls and clear Tegaderm were placed over the site of the hernia to prevent seroma formation.  The patient was subsequently extubated and sent to the PACU to undergo recovery.  All lap counts and needle counts were correct at the end of the procedure.    Estimated Blood Loss:   5 cc    Specimens:    * No specimens in log *       Drains:        Complications:    None    Jefferson Rodriguez DO

## 2022-12-22 NOTE — ANESTHESIA PROCEDURE NOTES
Airway       Patient location during procedure: OR       Procedure Start/Stop Times: 12/22/2022 7:48 AM  Staff -        Performed By: CRNAIndications and Patient Condition       Indications for airway management: pierre-procedural       Induction type:intravenous       Mask difficulty assessment: 1 - vent by mask    Final Airway Details       Final airway type: endotracheal airway       Successful airway: ETT - single  Endotracheal Airway Details        ETT size (mm): 8.0       Cuffed: yes       Cuff volume (mL): 7       Successful intubation technique: video laryngoscopy       VL Blade Size: Glidescope 3       Grade View of Cords: 1       Adjucts: stylet       Position: Right       Measured from: lips       Secured at (cm): 24       Bite block used: None    Post intubation assessment        Placement verified by: capnometry, equal breath sounds and chest rise        Number of attempts at approach: 1       Number of other approaches attempted: 0       Secured with: silk tape       Ease of procedure: easy       Dentition: Intact       Dental guard used and removed.    Medication(s) Administered   Medication Administration Time: 12/22/2022 7:48 AM

## 2022-12-22 NOTE — ANESTHESIA POSTPROCEDURE EVALUATION
Patient: Omi Grimm    Procedure: Procedure(s):  HERNIORRHAPHY, INCISIONAL, ROBOT-ASSISTED, LAPAROSCOPIC, USING DA CHRIST XI       Anesthesia Type:  General    Note:  Disposition: Outpatient   Postop Pain Control: Uneventful            Sign Out: Well controlled pain   PONV: No   Neuro/Psych: Uneventful            Sign Out: Acceptable/Baseline neuro status   Airway/Respiratory: Uneventful            Sign Out: Acceptable/Baseline resp. status   CV/Hemodynamics: Uneventful            Sign Out: Acceptable CV status; No obvious hypovolemia; No obvious fluid overload   Other NRE: NONE   DID A NON-ROUTINE EVENT OCCUR? No           Last vitals:  Vitals Value Taken Time   /94 12/22/22 1030   Temp 36.7  C (98.1  F) 12/22/22 1000   Pulse 71 12/22/22 1032   Resp 11 12/22/22 1030   SpO2 97 % 12/22/22 1032   Vitals shown include unvalidated device data.    Electronically Signed By: Dave Farmer MD  December 22, 2022  11:42 AM

## 2022-12-22 NOTE — H&P
HPI  57 year old year old male who presents for right abdominal wall hernia repair      Allergies:Penicillins and Dilaudid [hydromorphone]    Past Medical History:   Diagnosis Date     Acute bronchitis      Allergic rhinitis      Asthma      Asymptomatic gallstones 7/31/2017     Diverticulitis      Diverticulosis 1/16/2020    Added automatically from request for surgery [id number]      Dysphagia     Created by Conversion      Graves disease      Graves' Disease     Created by Conversion Replacement Utility updated for latest IMO load      History of diverticulitis 2/26/2016     Hypercalcemia      Hyperparathyroidism (H) 8/9/2018     Hypertension      Hypothyroidism     Created by Conversion Replacement Utility updated for latest IMO load      Kidney stone 11/06/2017    first stone at age 52     Lump In / On The Skin     Created by Conversion      Malignant melanoma of skin of trunk, except scrotum (H)     removed from back 2015     Mild intermittent asthma     Created by Conversion      Multiple Environmental Allergies     Created by Conversion [facility] Annotation: Jan 11 2010 12:36PM - Farideh Gilliland: DOGS      Pain In The Hands     Created by Conversion      PONV (postoperative nausea and vomiting)      S/P partial colectomy 2/14/2020     Unspecified hypothyroidism      Urinary tract stones 7/31/2017       Past Surgical History:   Procedure Laterality Date     BIOPSY SKIN (LOCATION)       EYE SURGERY      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     HEMANGIOMA EXCISION      as an infant     OTHER SURGICAL HISTORY      wrist surgery, right     MS BX/REMV,LYMPH NODE,DEEP AXILL Left 6/5/2014    Procedure:  AXILLARY LYMPH NODE DISSECTION-LEFT;  Surgeon: Sergey Glover MD;  Location: [facility];  Service: General     MS CYSTO/URETERO W/LITHOTRIPSY &INDWELL STENT INSRT Left 11/14/2017    Procedure: CYSTOSCOPY, WITH LEFT URETEROSCOPY,  LASER LITHOTRIPSY STONE EXTRACTION STENT INSERTION;  Surgeon: González Lagos,  MD;  Location: [facility];  Service: Urology     KS EXCIS SUPRATENT MENINGIOMA      Description: Craniotomy Supratentorial Excision Of Meningioma;  Recorded: 02/06/2013;  Comments: Excision of frontal facial meningioma at age 6 months (residual ptosis right eye)     KS LAP,SURG,COLECTOMY, PARTIAL, W/ANAST N/A 2/14/2020    Procedure: COLECTOMY, SIGMOID, ROBOT-ASSISTED, LAPAROSCOPIC, USING DA CHRIST XI;  Surgeon: Jefferson Rodriguez DO;  Location: [facility];  Service: General     SINUS SURGERY      polyposis     SINUS SURGERY  April 2011 - (x2)    polyp removal x2     THYROIDECTOMY, PARTIAL N/A 10/9/2018    Procedure: PARATHYROIDECTOMY 23HR;  Surgeon: Vincent Linn MD;  Location: [facility];  Service:      VASECTOMY  12/2011     WRIST SURGERY  2003         CURRENT MEDS:    Current Facility-Administered Medications:      clindamycin (CLEOCIN) infusion 900 mg, 900 mg, Intravenous, Pre-Op/Pre-procedure x 1 dose, Jefferson Rodriguez DO     clindamycin (CLEOCIN) infusion 900 mg, 900 mg, Intravenous, See Admin Instructions, Jefferson Rodriguez DO, Last Rate: 100 mL/hr at 12/22/22 0709, 900 mg at 12/22/22 0709     lactated ringers infusion, , Intravenous, Continuous, Mee Khan MD, Last Rate: 100 mL/hr at 12/22/22 0709, 100 mL/hr at 12/22/22 0709     lidocaine (LMX4) cream, , Topical, Q1H PRN, Mee Khan MD     lidocaine 1 % 0.1-1 mL, 0.1-1 mL, Other, Q1H PRN, Mee Khan MD     magnesium sulfate 4 g in 50 mL sterile water (premade), 4 g, Intravenous, Once, Mee Khan MD     sodium chloride (PF) 0.9% PF flush 3 mL, 3 mL, Intracatheter, Q8H, Mee Khan MD     sodium chloride (PF) 0.9% PF flush 3 mL, 3 mL, Intracatheter, q1 min prn, Mee Khan MD    FAMHX-review     reports that he has never smoked. He has never used smokeless tobacco. He reports that he does not drink alcohol and does not use drugs.    Review of Systems:  The 12 point review of systems  is within  normal limits except for as mentioned above in the HPI.  General ROS: No complaints or constitutional symptoms  Ophthalmic ROS: No complaints of visual changes  Skin: No complaints or symptoms   Endocrine: No complaints or symptoms  Hematologic/Lymphatic: No symptoms or complaints  Psychiatric: No symptoms or complaints  Respiratory ROS: no cough, shortness of breath, or wheezing  Cardiovascular ROS: no chest pain or dyspnea on exertion  Gastrointestinal ROS: As per HPI  Genito-Urinary ROS: no dysuria, trouble voiding, or hematuria  Musculoskeletal ROS: no joint or muscle pain  Neurological ROS: no TIA or stroke symptoms      EXAM:  BP (!) 160/93 (BP Location: Left arm)   Pulse 80   Temp 98.3  F (36.8  C) (Oral)   Resp 18   Wt 83.1 kg (183 lb 1.6 oz)   SpO2 97%   BMI 24.83 kg/m    GENERAL: Well developed male, No acute distress, pleasant and conversant   EYES: Pupils equal, round and reactive, no scleral icterus  ABDOMEN: soft, palpable hernia  Lungs- CTA bilaterally  Cv- regular rate and rhythm  SKIN: Pink, warm and dry, no obvious rashes or lesions   NEURO:No focal deficits, ambulatory  MUSCULOSKELETAL:No obvious deformities, no swelling, normal appearing      LABS:  Lab Results   Component Value Date    WBC 6.4 09/26/2022    HGB 14.7 09/26/2022    HCT 43.2 09/26/2022    MCV 85 09/26/2022     09/26/2022     INR/Prothrombin Time  No results for input(s): NA, CO2, BUN, CREATININE, GLUCOSE in the last 168 hours.    Invalid input(s): K, CL, LABGLOM, CALCIUM  Lab Results   Component Value Date    ALT 18 09/26/2022    AST 19 09/26/2022    ALKPHOS 68 09/26/2022           Assessment/Plan:   Omi Grimm is a 57 year old male with an incisional hernia  -plan for robotic repair of his incisional hernia      Jaziel Rodriguez D.O. FACS  [phone]

## 2022-12-22 NOTE — ANESTHESIA PREPROCEDURE EVALUATION
Anesthesia Pre-Procedure Evaluation    Patient: Omi Grimm   MRN: [id number] : 1965        Procedure : Procedure(s):  HERNIORRHAPHY, INCISIONAL, ROBOT-ASSISTED, LAPAROSCOPIC, USING DA CHRIST XI          Past Medical History:   Diagnosis Date     Acute bronchitis      Allergic rhinitis      Asthma      Asymptomatic gallstones 2017     Diverticulitis      Diverticulosis 2020    Added automatically from request for surgery [id number]      Dysphagia     Created by Conversion      Graves disease      Graves' Disease     Created by Conversion Replacement Utility updated for latest IMO load      History of diverticulitis 2016     Hypercalcemia      Hyperparathyroidism (H) 2018     Hypertension      Hypothyroidism     Created by Conversion Replacement Utility updated for latest IMO load      Kidney stone 2017    first stone at age 52     Lump In / On The Skin     Created by Conversion      Malignant melanoma of skin of trunk, except scrotum (H)     removed from back      Mild intermittent asthma     Created by Conversion      Multiple Environmental Allergies     Created by Conversion [facility] Annotation: 2010 12:36PM - Farideh Gilliland: DOGS      Pain In The Hands     Created by Conversion      PONV (postoperative nausea and vomiting)      S/P partial colectomy 2020     Unspecified hypothyroidism      Urinary tract stones 2017      Past Surgical History:   Procedure Laterality Date     BIOPSY SKIN (LOCATION)       EYE SURGERY      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     HEMANGIOMA EXCISION      as an infant     OTHER SURGICAL HISTORY      wrist surgery, right     CO BX/REMV,LYMPH NODE,DEEP AXILL Left 2014    Procedure:  AXILLARY LYMPH NODE DISSECTION-LEFT;  Surgeon: Sergey Glover MD;  Location: [facility];  Service: General     CO CYSTO/URETERO W/LITHOTRIPSY &INDWELL STENT INSRT Left 2017    Procedure: CYSTOSCOPY, WITH LEFT URETEROSCOPY,   LASER LITHOTRIPSY STONE EXTRACTION STENT INSERTION;  Surgeon: González Lagos MD;  Location: [facility];  Service: Urology     IN EXCIS SUPRATENT MENINGIOMA      Description: Craniotomy Supratentorial Excision Of Meningioma;  Recorded: 02/06/2013;  Comments: Excision of frontal facial meningioma at age 6 months (residual ptosis right eye)     IN LAP,SURG,COLECTOMY, PARTIAL, W/ANAST N/A 2/14/2020    Procedure: COLECTOMY, SIGMOID, ROBOT-ASSISTED, LAPAROSCOPIC, USING DA CHRIST XI;  Surgeon: Jefferson Rodriguez DO;  Location: [facility];  Service: General     SINUS SURGERY      polyposis     SINUS SURGERY  April 2011 - (x2)    polyp removal x2     THYROIDECTOMY, PARTIAL N/A 10/9/2018    Procedure: PARATHYROIDECTOMY 23HR;  Surgeon: Vincent Linn MD;  Location: [facility];  Service:      VASECTOMY  12/2011     WRIST SURGERY  2003      Allergies   Allergen Reactions     Penicillins Shortness Of Breath and Swelling     Dilaudid [Hydromorphone] Dizziness      Social History     Tobacco Use     Smoking status: Never     Smokeless tobacco: Never   Substance Use Topics     Alcohol use: No      Wt Readings from Last 1 Encounters:   12/22/22 83.1 kg (183 lb 1.6 oz)        Anesthesia Evaluation   Pt has had prior anesthetic.     History of anesthetic complications  - PONV.      ROS/MED HX  ENT/Pulmonary:     (+) Mild Persistent, asthma (worse after COVID19 few months ago)     Neurologic:  - neg neurologic ROS     Cardiovascular:     (+) hypertension-----    METS/Exercise Tolerance: >4 METS    Hematologic:  - neg hematologic  ROS     Musculoskeletal: Comment:  Incisional hernia, without obstruction or gangrene      GI/Hepatic:  - neg GI/hepatic ROS     Renal/Genitourinary:     (+) Nephrolithiasis ,     Endo:     (+) thyroid problem, hypothyroidism,     Psychiatric/Substance Use:       Infectious Disease:       Malignancy:       Other:            Physical Exam    Airway        Mallampati: II   TM distance: >  3 FB   Neck ROM: full   Mouth opening: > 3 cm    Respiratory Devices and Support         Dental         B=Bridge, C=Chipped, L=Loose, M=Missing    Cardiovascular   cardiovascular exam normal          Pulmonary   pulmonary exam normal                OUTSIDE LABS:  CBC:   Lab Results   Component Value Date    WBC 6.4 09/26/2022    WBC 4.6 10/04/2021    HGB 14.7 09/26/2022    HGB 15.4 10/04/2021    HCT 43.2 09/26/2022    HCT 46.4 10/04/2021     09/26/2022     10/04/2021     BMP:   Lab Results   Component Value Date     09/26/2022     10/04/2021    POTASSIUM 4.0 09/26/2022    POTASSIUM 4.2 10/04/2021    CHLORIDE 103 09/26/2022    CHLORIDE 103 10/04/2021    CO2 25 09/26/2022    CO2 28 10/04/2021    BUN 16 09/26/2022    BUN 15 10/04/2021    CR 1.06 09/26/2022    CR 1.18 10/04/2021     09/26/2022    GLC 96 12/29/2021     COAGS: No results found for: PTT, INR, FIBR  POC: No results found for: BGM, HCG, HCGS  HEPATIC:   Lab Results   Component Value Date    ALBUMIN 3.7 09/26/2022    PROTTOTAL 7.7 09/26/2022    ALT 18 09/26/2022    AST 19 09/26/2022    ALKPHOS 68 09/26/2022    BILITOTAL 0.4 09/26/2022     OTHER:   Lab Results   Component Value Date    PH 7.37 10/23/2018    MIKAYLA 9.3 09/26/2022    PHOS 2.3 (L) 08/13/2018    PHOS 2.3 (L) 08/13/2018    MAG 2.4 10/05/2018    TSH 41.43 (H) 12/29/2021    T4 1.05 12/29/2021    SED 49 (H) 10/17/2018       Anesthesia Plan    ASA Status:  2   NPO Status:  NPO Appropriate    Anesthesia Type: General.     - Airway: ETT   Induction: Intravenous, Propofol.   Maintenance: TIVA.        Consents    Anesthesia Plan(s) and associated risks, benefits, and realistic alternatives discussed. Questions answered and patient/representative(s) expressed understanding.     - Discussed: Risks, Benefits and Alternatives for BOTH SEDATION and the PROCEDURE were discussed     - Discussed with:  Patient, Spouse      - Extended Intubation/Ventilatory Support Discussed: No.       - Patient is DNR/DNI Status: No    Use of blood products discussed: Yes.     - Discussed with: Patient.     - Consented: consented to blood products            Reason for refusal: other.     Postoperative Care    Pain management: Multi-modal analgesia.   PONV prophylaxis: Ondansetron (or other 5HT-3), Dexamethasone or Solumedrol     Comments:    Other Comments: Acetaminophen in preop  Magnesium  Ketamine with robinul    Decadron 10 mg  Zofran  TIVA    Reverse with Sugammadex            Dave Farmer MD

## 2023-01-05 ENCOUNTER — VIRTUAL VISIT (OUTPATIENT)
Dept: SURGERY | Facility: CLINIC | Age: 58
End: 2023-01-05

## 2023-01-05 DIAGNOSIS — K43.2 INCISIONAL HERNIA, WITHOUT OBSTRUCTION OR GANGRENE: Primary | ICD-10-CM

## 2023-01-05 PROCEDURE — 99024 POSTOP FOLLOW-UP VISIT: CPT | Performed by: PHYSICIAN ASSISTANT

## 2023-01-05 NOTE — LETTER
1/5/2023         RE: Omi Grimm  [address] [facility] [address]        Dear Colleague,    Thank you for referring your patient, Omi Grimm, to the [facility] [place]. Please see a copy of my visit note below.    Telemedicine Visit: The patient's condition can be safely assessed and treated via telephone telemedicine encounter.      Reason for Telemedicine Visit: Patient has requested telehealth visit    Originating Site (Patient Location): Patient's home    Distant Site (Provider Location): [facility]    Consent:  The patient/guardian has verbally consented to: the potential risks and benefits of telemedicine (video visit) versus in person care; bill my insurance or make self-payment for services provided; and responsibility for payment of non-covered services.     Mode of Communication:  telephone    As the provider I attest to compliance with applicable laws and regulations related to telemedicine.       HPI: Pt is s/p robotic incisional hernia repair with Dr. Rodriguez on 12/22.   he is doing well.  Pain is well controlled:  Yes. No difficulties with the surgical wound/wounds, no erythema or drainage.  he is eating well and denies fever and chills. Bowel function has returned to normal.      Assessment/Plan: Doing well after surgery and should follow up via telephone in about 4 weeks to make sure he is ready to go back to work full duty    Wang Kim PA-C  [facility]  Surgery [address]  [place] [address]?  Office: [phone]          Again, thank you for allowing me to participate in the care of your patient.        Sincerely,        Wang Kim PA-C

## 2023-01-09 NOTE — PROGRESS NOTES
Telemedicine Visit: The patient's condition can be safely assessed and treated via telephone telemedicine encounter.      Reason for Telemedicine Visit: Patient has requested telehealth visit    Originating Site (Patient Location): Patient's home    Distant Site (Provider Location): [facility]    Consent:  The patient/guardian has verbally consented to: the potential risks and benefits of telemedicine (video visit) versus in person care; bill my insurance or make self-payment for services provided; and responsibility for payment of non-covered services.     Mode of Communication:  telephone    As the provider I attest to compliance with applicable laws and regulations related to telemedicine.       HPI: Pt is s/p robotic incisional hernia repair with Dr. Rodriguez on 12/22.   he is doing well.  Pain is well controlled:  Yes. No difficulties with the surgical wound/wounds, no erythema or drainage.  he is eating well and denies fever and chills. Bowel function has returned to normal.      Assessment/Plan: Doing well after surgery and should follow up via telephone in about 4 weeks to make sure he is ready to go back to work full duty    Wang Kim PA-C  [facility]  Surgery [address]  [place] [address]?  Office: [phone]

## 2023-01-30 ENCOUNTER — VIRTUAL VISIT (OUTPATIENT)
Dept: SURGERY | Facility: CLINIC | Age: 58
End: 2023-01-30

## 2023-01-30 DIAGNOSIS — Z48.89 POSTOPERATIVE VISIT: Primary | ICD-10-CM

## 2023-01-30 PROCEDURE — 99024 POSTOP FOLLOW-UP VISIT: CPT | Performed by: SURGERY

## 2023-01-30 NOTE — PROGRESS NOTES
"  The patient has been notified of following:     \"This telephone visit will be conducted via a call between you and your physician/provider. We have found that certain health care needs can be provided without the need for a physical exam.  This service lets us provide the care you need with a short phone conversation.  If a prescription is necessary we can send it directly to your pharmacy.  If lab work is needed we can place an order for that and you can then stop by our lab to have the test done at a later time.    Telephone visits are billed at different rates depending on your insurance coverage. During this emergency period, for some insurers they may be billed the same as an in-person visit.  Please reach out to your insurance provider with any questions.    If during the course of the call the physician/provider feels a telephone visit is not appropriate, you will not be charged for this service.\"    Patient has given verbal consent to a Telephone visit? Yes    What phone number would you like to be contacted at? [phone]    Patient would like to receive their AVS by [facility]          Distant Location (provider location):  On-site    Additional provider notes: I spoke with Omi regarding his postoperative course.  He is doing relatively well and has avoided lifting although he is at work.  He does note that the left upper quadrant incision is spitting suture which is likely the absorbable Monocryl.  At this point she will continue with no activities until his course of healing is completed.  He will follow-up with me on a as needed basis.  If it anytime he would like to have the suture trimmed he will follow-up in clinic.  Otherwise, this should dissolve in the next 90 days.    Phone call duration: 10 minutes    Jaziel Rodriguez DO [facility] Surgery  [phone]    "

## 2023-01-30 NOTE — LETTER
"    1/30/2023         RE: Omi Grimm  [address]  [facility] [address]        Dear Colleague,    Thank you for referring your patient, Omi Grimm, to the [facility]. Please see a copy of my visit note below.      The patient has been notified of following:     \"This telephone visit will be conducted via a call between you and your physician/provider. We have found that certain health care needs can be provided without the need for a physical exam.  This service lets us provide the care you need with a short phone conversation.  If a prescription is necessary we can send it directly to your pharmacy.  If lab work is needed we can place an order for that and you can then stop by our lab to have the test done at a later time.    Telephone visits are billed at different rates depending on your insurance coverage. During this emergency period, for some insurers they may be billed the same as an in-person visit.  Please reach out to your insurance provider with any questions.    If during the course of the call the physician/provider feels a telephone visit is not appropriate, you will not be charged for this service.\"    Patient has given verbal consent to a Telephone visit? Yes    What phone number would you like to be contacted at? [phone]    [facility]          Distant Location (provider location):  On-site    Additional provider notes: I spoke with Omi regarding his postoperative course.  He is doing relatively well and has avoided lifting although he is at work.  He does note that the left upper quadrant incision is spitting suture which is likely the absorbable Monocryl.  At this point she will continue with no activities until his course of healing is completed.  He will follow-up with me on a as needed basis.  If it anytime he would like to have the suture trimmed he will follow-up in clinic. "  Otherwise, this should dissolve in the next 90 days.    Phone call duration: 10 minutes    Jaziel Rodriguez DO [facility]  [phone]        Again, thank you for allowing me to participate in the care of your patient.        Sincerely,        Jefferson Rodriguez, DO

## 2023-02-05 ENCOUNTER — HEALTH MAINTENANCE LETTER (OUTPATIENT)
Age: 58
End: 2023-02-05

## 2023-03-07 DIAGNOSIS — E03.9 HYPOTHYROIDISM, UNSPECIFIED TYPE: ICD-10-CM

## 2023-03-07 NOTE — TELEPHONE ENCOUNTER
"Routing refill request to provider for review/approval because:  Labs not current:  TSH  New PCP    Last Written Prescription Date:  2/01/22  Last Fill Quantity: 90,  # refills: 1   Last office visit provider:  Dr. Petersen 9/13/22     Requested Prescriptions   Pending Prescriptions Disp Refills     levothyroxine (SYNTHROID/LEVOTHROID) 200 MCG tablet [Pharmacy Med Name: LEVOTHYROXIN 200MCG] 90 tablet 0     Sig: TAKE 1 TABLET (200 MCG) BY MOUTH DAILY       Thyroid Protocol Failed - 3/7/2023  2:23 PM        Failed - Normal TSH on file in past 12 months     Recent Labs   Lab Test 12/29/21  1047   TSH 41.43*              Passed - Patient is 12 years or older        Passed - Recent (12 mo) or future (30 days) visit within the authorizing provider's specialty     Patient has had an office visit with the authorizing provider or a provider within the authorizing providers department within the previous 12 mos or has a future within next 30 days. See \"Patient Info\" tab in inbasket, or \"Choose Columns\" in Meds & Orders section of the refill encounter.              Passed - Medication is active on med list             Nicki Figueroa RN 03/07/23 2:28 PM  "

## 2023-03-08 RX ORDER — LEVOTHYROXINE SODIUM 200 UG/1
200 TABLET ORAL DAILY
Qty: 90 TABLET | Refills: 0 | Status: SHIPPED | OUTPATIENT
Start: 2023-03-08 | End: 2023-07-11

## 2023-03-09 ENCOUNTER — OFFICE VISIT (OUTPATIENT)
Dept: FAMILY MEDICINE | Facility: CLINIC | Age: 58
End: 2023-03-09
Payer: COMMERCIAL

## 2023-03-09 VITALS
BODY MASS INDEX: 25.73 KG/M2 | SYSTOLIC BLOOD PRESSURE: 146 MMHG | HEIGHT: 72 IN | OXYGEN SATURATION: 99 % | HEART RATE: 79 BPM | RESPIRATION RATE: 18 BRPM | WEIGHT: 190 LBS | DIASTOLIC BLOOD PRESSURE: 84 MMHG | TEMPERATURE: 97.9 F

## 2023-03-09 DIAGNOSIS — H81.4: Primary | ICD-10-CM

## 2023-03-09 PROCEDURE — 99214 OFFICE O/P EST MOD 30 MIN: CPT | Performed by: FAMILY MEDICINE

## 2023-03-09 RX ORDER — MECLIZINE HYDROCHLORIDE 25 MG/1
25 TABLET ORAL 3 TIMES DAILY PRN
Qty: 20 TABLET | Refills: 1 | Status: SHIPPED | OUTPATIENT
Start: 2023-03-09 | End: 2023-11-27

## 2023-03-09 NOTE — PROGRESS NOTES
Assessment & Plan     Nystagmus, positional, central type, left  Work-up as follows follow-up once results are known  - MR Brain w/o & w Contrast  - meclizine (ANTIVERT) 25 MG tablet  Dispense: 20 tablet; Refill: 1               BMI:   Estimated body mass index is 25.77 kg/m  as calculated from the following:    Height as of this encounter: 1.829 m (6').    Weight as of this encounter: 86.2 kg (190 lb).           No follow-ups on file.    Kane Petersen MD  [facility]    Haile Braga is a 57 year old, presenting for the following health issues:  Vertigo      HPI Omi became acutely dizzy about 7 days ago and noticed his balance was off and he seemed to want to tip over to the left side.  He was not experiencing any visual complaints at that time.  He had no auditory complaints also.  No recent viral infections.  He is never experienced this type of dizziness before since that time symptoms have waxed and waned intermittently.  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  On today's examination I had a negative ear nose and throat exam but I did notice a horizontal nystagmus of his left eye quick component to the left.  Also on having the patient do left lateral gaze he became acutely dizzy.  Chest exam negative heart exam negative.  This may be vestibular nystagmus benign condition but we need to of course image for other problems causing nystagmus and I will order an MRI and will get it done as soon as possible I will treat him with a cerebral and cerebellar sedative meclizine as needed until we have results.  We did ask his wife to expedite the placing of the MRI and we explained our methodology.          Review of Systems   Constitutional, HEENT, cardiovascular, pulmonary, gi and gu systems are negative, except as otherwise noted.      Objective    BP (!) 146/84   Pulse 79   Temp 97.9   F (36.6  C)   Resp 18   Ht 1.829 m (6')   Wt 86.2 kg (190 lb)   SpO2 99%   BMI 25.77 kg/m    Body mass index is 25.77 kg/m .  Physical Exam   GENERAL: healthy, alert and no distress  NECK: no adenopathy, no asymmetry, masses, or scars and thyroid normal to palpation  RESP: lungs clear to auscultation - no rales, rhonchi or wheezes  CV: regular rate and rhythm, normal S1 S2, no S3 or S4, no murmur, click or rub, no peripheral edema and peripheral pulses strong  ABDOMEN: soft, nontender, no hepatosplenomegaly, no masses and bowel sounds normal  MS: no gross musculoskeletal defects noted, no edema    HEENT- benign mobile TMs nasopharynx clear; I did notice a horizontal nystagmus of the OS and lightheadedness with lateral left gaze.  This is an apparent new finding.

## 2023-03-10 ENCOUNTER — HOSPITAL ENCOUNTER (OUTPATIENT)
Dept: MRI IMAGING | Facility: CLINIC | Age: 58
Discharge: HOME | End: 2023-03-10
Attending: FAMILY MEDICINE | Admitting: FAMILY MEDICINE
Payer: COMMERCIAL

## 2023-03-10 DIAGNOSIS — H81.4: ICD-10-CM

## 2023-03-10 PROCEDURE — 255N000002 HC RX 255 OP 636: Performed by: FAMILY MEDICINE

## 2023-03-10 PROCEDURE — 70553 MRI BRAIN STEM W/O & W/DYE: CPT

## 2023-03-10 PROCEDURE — A9585 GADOBUTROL INJECTION: HCPCS | Performed by: FAMILY MEDICINE

## 2023-03-10 RX ORDER — GADOBUTROL 604.72 MG/ML
8.5 INJECTION INTRAVENOUS ONCE
Status: COMPLETED | OUTPATIENT
Start: 2023-03-10 | End: 2023-03-10

## 2023-03-10 RX ADMIN — GADOBUTROL 8.5 ML: 604.72 INJECTION INTRAVENOUS at 09:07

## 2023-04-12 ENCOUNTER — TRANSFERRED RECORDS (OUTPATIENT)
Dept: HEALTH INFORMATION MANAGEMENT | Facility: CLINIC | Age: 58
End: 2023-04-12
Payer: COMMERCIAL

## 2023-08-09 ENCOUNTER — OFFICE VISIT (OUTPATIENT)
Dept: FAMILY MEDICINE | Facility: CLINIC | Age: 58
End: 2023-08-09
Payer: COMMERCIAL

## 2023-08-09 VITALS
SYSTOLIC BLOOD PRESSURE: 117 MMHG | WEIGHT: 182.4 LBS | OXYGEN SATURATION: 98 % | HEART RATE: 91 BPM | TEMPERATURE: 99 F | RESPIRATION RATE: 15 BRPM | HEIGHT: 71 IN | DIASTOLIC BLOOD PRESSURE: 75 MMHG | BODY MASS INDEX: 25.54 KG/M2

## 2023-08-09 DIAGNOSIS — L50.9 URTICARIA: Primary | ICD-10-CM

## 2023-08-09 LAB
BASOPHILS # BLD AUTO: 0 10E3/UL (ref 0–0.2)
BASOPHILS NFR BLD AUTO: 0 %
CRP SERPL-MCNC: 51.9 MG/L
EOSINOPHIL # BLD AUTO: 0.3 10E3/UL (ref 0–0.7)
EOSINOPHIL NFR BLD AUTO: 5 %
ERYTHROCYTE [DISTWIDTH] IN BLOOD BY AUTOMATED COUNT: 13 % (ref 10–15)
ERYTHROCYTE [SEDIMENTATION RATE] IN BLOOD BY WESTERGREN METHOD: 20 MM/HR (ref 0–20)
HCT VFR BLD AUTO: 44.3 % (ref 40–53)
HGB BLD-MCNC: 14.8 G/DL (ref 13.3–17.7)
IMM GRANULOCYTES # BLD: 0 10E3/UL
IMM GRANULOCYTES NFR BLD: 0 %
LYMPHOCYTES # BLD AUTO: 0.8 10E3/UL (ref 0.8–5.3)
LYMPHOCYTES NFR BLD AUTO: 14 %
MCH RBC QN AUTO: 28.1 PG (ref 26.5–33)
MCHC RBC AUTO-ENTMCNC: 33.4 G/DL (ref 31.5–36.5)
MCV RBC AUTO: 84 FL (ref 78–100)
MONOCYTES # BLD AUTO: 0.7 10E3/UL (ref 0–1.3)
MONOCYTES NFR BLD AUTO: 12 %
NEUTROPHILS # BLD AUTO: 3.7 10E3/UL (ref 1.6–8.3)
NEUTROPHILS NFR BLD AUTO: 68 %
PLATELET # BLD AUTO: 188 10E3/UL (ref 150–450)
RBC # BLD AUTO: 5.27 10E6/UL (ref 4.4–5.9)
WBC # BLD AUTO: 5.5 10E3/UL (ref 4–11)

## 2023-08-09 PROCEDURE — 85025 COMPLETE CBC W/AUTO DIFF WBC: CPT | Performed by: FAMILY MEDICINE

## 2023-08-09 PROCEDURE — 86753 PROTOZOA ANTIBODY NOS: CPT | Mod: 90 | Performed by: FAMILY MEDICINE

## 2023-08-09 PROCEDURE — 36415 COLL VENOUS BLD VENIPUNCTURE: CPT | Performed by: FAMILY MEDICINE

## 2023-08-09 PROCEDURE — 87484 EHRLICHA CHAFFEENSIS AMP PRB: CPT | Mod: 90 | Performed by: FAMILY MEDICINE

## 2023-08-09 PROCEDURE — 87468 ANAPLSMA PHGCYTOPHLM AMP PRB: CPT | Mod: 90 | Performed by: FAMILY MEDICINE

## 2023-08-09 PROCEDURE — 99214 OFFICE O/P EST MOD 30 MIN: CPT | Performed by: FAMILY MEDICINE

## 2023-08-09 PROCEDURE — 87798 DETECT AGENT NOS DNA AMP: CPT | Mod: 90 | Performed by: FAMILY MEDICINE

## 2023-08-09 PROCEDURE — 86140 C-REACTIVE PROTEIN: CPT | Performed by: FAMILY MEDICINE

## 2023-08-09 PROCEDURE — 85652 RBC SED RATE AUTOMATED: CPT | Performed by: FAMILY MEDICINE

## 2023-08-09 PROCEDURE — 86618 LYME DISEASE ANTIBODY: CPT | Performed by: FAMILY MEDICINE

## 2023-08-09 PROCEDURE — 99000 SPECIMEN HANDLING OFFICE-LAB: CPT | Performed by: FAMILY MEDICINE

## 2023-08-09 RX ORDER — TRIAMCINOLONE ACETONIDE 1 MG/G
CREAM TOPICAL 2 TIMES DAILY
Qty: 80 G | Refills: 1 | Status: SHIPPED | OUTPATIENT
Start: 2023-08-09 | End: 2023-11-27

## 2023-08-09 RX ORDER — HYDROXYZINE HYDROCHLORIDE 10 MG/1
10 TABLET, FILM COATED ORAL 3 TIMES DAILY PRN
Qty: 21 TABLET | Refills: 0 | Status: SHIPPED | OUTPATIENT
Start: 2023-08-09 | End: 2023-11-27

## 2023-08-09 ASSESSMENT — PAIN SCALES - GENERAL: PAINLEVEL: NO PAIN (0)

## 2023-08-09 ASSESSMENT — ASTHMA QUESTIONNAIRES: ACT_TOTALSCORE: 24

## 2023-08-09 NOTE — PROGRESS NOTES
"  Assessment & Plan     Urticaria  Patient developed a maculopapular rash with urticaria 2 weeks after hiking in the mountains in the [place] area; workup will be for viral illness patient has had a low-grade temp but we have advised him to check it twice daily and we will see him for follow-up next week I have prescribed Atarax and triamcinolone for symptoms                 Kane Petersen MD  [facility] is a 57 year old, presenting for the following health issues:  Derm Problem (No energy- body aches been going on since monday)      8/9/2023     7:47 AM   Additional Questions   Roomed by Kelsy       [unfilled]             [unfilled]   Constitutional, HEENT, cardiovascular, pulmonary, gi and gu systems are negative, except as otherwise noted.      Objective    /75 (BP Location: Right arm, Patient Position: Sitting, Cuff Size: Adult Regular)   Pulse 91   Temp 99  F (37.2  C) (Oral)   Resp 15   Ht 1.807 m (5' 11.14\")   Wt 82.7 kg (182 lb 6.4 oz)   SpO2 98%   BMI 25.34 kg/m    Body mass index is 25.34 kg/m .  [unfilled]   GENERAL: healthy, alert and no distress  NECK: no adenopathy, no asymmetry, masses, or scars and thyroid normal to palpation  RESP: lungs clear to auscultation - no rales, rhonchi or wheezes  CV: regular rate and rhythm, normal S1 S2, no S3 or S4, no murmur, click or rub, no peripheral edema and peripheral pulses strong  ABDOMEN: soft, nontender, no hepatosplenomegaly, no masses and bowel sounds normal  MS: no gross musculoskeletal defects noted, no edema        Skin-patient has a diffuse maculopapular rash over generalized back forearms legs and dorsal surfaces no adenopathy was noted              Assessment & Plan     Urticaria  Workup as follows  - CBC with platelets and differential; Future  - Lyme Disease Total Abs Bld with Reflex to Confirm CLIA; Future  - ESR: Erythrocyte sedimentation rate; Future  - CRP, inflammation; Future  - " "Ehrlichia Anaplasma Sp by PCR; Future  - Babesia antibody IgG IgM; Future  - hydrOXYzine (ATARAX) 10 MG tablet; Take 1 tablet (10 mg) by mouth 3 times daily as needed for itching  - triamcinolone (KENALOG) 0.1 % external cream; Apply topically 2 times daily  - CBC with platelets and differential  - Lyme Disease Total Abs Bld with Reflex to Confirm CLIA  - ESR: Erythrocyte sedimentation rate  - CRP, inflammation  - Ehrlichia Anaplasma Sp by PCR  - Babesia antibody IgG IgM                 Kane Petersen MD  [facility] a 57 year old, presenting for the following health issues:  Derm Problem (No energy- body aches been going on since monday)      8/9/2023     7:47 AM   Additional Questions   Roomed by Kelsy       [unfilled]             [unfilled]   Constitutional, HEENT, cardiovascular, pulmonary, gi and gu systems are negative, except as otherwise noted.      Objective    /75 (BP Location: Right arm, Patient Position: Sitting, Cuff Size: Adult Regular)   Pulse 91   Temp 99  F (37.2  C) (Oral)   Resp 15   Ht 1.807 m (5' 11.14\")   Wt 82.7 kg (182 lb 6.4 oz)   SpO2 98%   BMI 25.34 kg/m    Body mass index is 25.34 kg/m .  [unfilled]   GENERAL: healthy, alert and no distress  NECK: no adenopathy, no asymmetry, masses, or scars and thyroid normal to palpation  RESP: lungs clear to auscultation - no rales, rhonchi or wheezes  CV: regular rate and rhythm, normal S1 S2, no S3 or S4, no murmur, click or rub, no peripheral edema and peripheral pulses strong  ABDOMEN: soft, nontender, no hepatosplenomegaly, no masses and bowel sounds normal  MS: no gross musculoskeletal defects noted, no edema    Skin maculopapular rash diffuse back forearms arms dorsum of thighs no adenopathy was noted                  Assessment & Plan     Urticaria  Workup as follows  - CBC with platelets and differential; Future  - Lyme Disease Total Abs Bld with Reflex to Confirm CLIA; " "Future  - ESR: Erythrocyte sedimentation rate; Future  - CRP, inflammation; Future  - Ehrlichia Anaplasma Sp by PCR; Future  - Babesia antibody IgG IgM; Future  - hydrOXYzine (ATARAX) 10 MG tablet; Take 1 tablet (10 mg) by mouth 3 times daily as needed for itching  - triamcinolone (KENALOG) 0.1 % external cream; Apply topically 2 times daily  - CBC with platelets and differential  - Lyme Disease Total Abs Bld with Reflex to Confirm CLIA  - ESR: Erythrocyte sedimentation rate  - CRP, inflammation  - Ehrlichia Anaplasma Sp by PCR  - Babesia antibody IgG IgM                 Kane Petersen MD  [facility]    Haile Braga is a 57 year old, presenting for the following health issues:  Derm Problem (No energy- body aches been going on since monday)      8/9/2023     7:47 AM   Additional Questions   Roomed by Kelsy ALLEN       Omi was well and enjoyed a weeks vacation in the [facility] of the country and they did a lot of mountain hiking he was exposed to a lot of trees under brush waited and streams etc. 10 days to 2 weeks later he developed maculopapular rash diffuse and urticarial generalized throughout his body with a low-grade temp he is a little bit lethargic tired more achy than usual.  He presents today for evaluation we need to rule out a viral etiology due to possible tickborne illness or other pathogens.  He denies a lot of mosquito bites as they were well protected against that.  He has had no nausea vomiting or diarrhea no blood in the stool no hematuria workup will be as outlined in the plan.  Will treat him symptomatically with triamcinolone cream and some oral antipyretics.  Follow-up 5 days from now he will keep a temperature log for me.      Review of Systems         Objective    /75 (BP Location: Right arm, Patient Position: Sitting, Cuff Size: Adult Regular)   Pulse 91   Temp 99  F (37.2  C) (Oral)   Resp 15   Ht 1.807 m (5' 11.14\")   Wt 82.7 kg " (182 lb 6.4 oz)   SpO2 98%   BMI 25.34 kg/m    Body mass index is 25.34 kg/m .  Physical Exam   GENERAL: healthy, alert and no distress  NECK: no adenopathy, no asymmetry, masses, or scars and thyroid normal to palpation  RESP: lungs clear to auscultation - no rales, rhonchi or wheezes  CV: regular rate and rhythm, normal S1 S2, no S3 or S4, no murmur, click or rub, no peripheral edema and peripheral pulses strong  ABDOMEN: soft, nontender, no hepatosplenomegaly, no masses and bowel sounds normal  MS: no gross musculoskeletal defects noted, no edema

## 2023-08-10 LAB — B BURGDOR IGG+IGM SER QL: 0.29

## 2023-08-11 LAB
B MICROTI IGG TITR SER: NORMAL {TITER}
B MICROTI IGM TITR SER: NORMAL {TITER}

## 2023-08-12 LAB
A PHAGOCYTOPH DNA BLD QL NAA+PROBE: NOT DETECTED
E CHAFFEENSIS DNA BLD QL NAA+PROBE: NOT DETECTED
E EWINGII DNA SPEC QL NAA+PROBE: NOT DETECTED
EHRLICHIA DNA SPEC QL NAA+PROBE: NOT DETECTED

## 2023-08-13 ENCOUNTER — OFFICE VISIT (OUTPATIENT)
Dept: FAMILY MEDICINE | Facility: CLINIC | Age: 58
End: 2023-08-13
Payer: COMMERCIAL

## 2023-08-13 VITALS
DIASTOLIC BLOOD PRESSURE: 80 MMHG | OXYGEN SATURATION: 98 % | TEMPERATURE: 98.9 F | SYSTOLIC BLOOD PRESSURE: 126 MMHG | HEART RATE: 86 BPM | RESPIRATION RATE: 16 BRPM

## 2023-08-13 DIAGNOSIS — R23.3 PETECHIAL RASH: Primary | ICD-10-CM

## 2023-08-13 DIAGNOSIS — R50.9 FEBRILE ILLNESS: ICD-10-CM

## 2023-08-13 LAB
ALBUMIN SERPL-MCNC: 3.6 G/DL (ref 3.5–5)
ALP SERPL-CCNC: 74 U/L (ref 45–120)
ALT SERPL W P-5'-P-CCNC: 15 U/L (ref 0–45)
ANION GAP SERPL CALCULATED.3IONS-SCNC: 11 MMOL/L (ref 5–18)
AST SERPL W P-5'-P-CCNC: 16 U/L (ref 0–40)
BASOPHILS # BLD AUTO: 0 10E3/UL (ref 0–0.2)
BASOPHILS NFR BLD AUTO: 1 %
BILIRUB SERPL-MCNC: 0.5 MG/DL (ref 0–1)
BUN SERPL-MCNC: 19 MG/DL (ref 8–22)
C REACTIVE PROTEIN LHE: 7.9 MG/DL (ref 0–?)
CALCIUM SERPL-MCNC: 10.4 MG/DL (ref 8.5–10.5)
CHLORIDE BLD-SCNC: 100 MMOL/L (ref 98–107)
CO2 SERPL-SCNC: 27 MMOL/L (ref 22–31)
CREAT SERPL-MCNC: 1.16 MG/DL (ref 0.7–1.3)
EOSINOPHIL # BLD AUTO: 0.4 10E3/UL (ref 0–0.7)
EOSINOPHIL NFR BLD AUTO: 7 %
ERYTHROCYTE [DISTWIDTH] IN BLOOD BY AUTOMATED COUNT: 12.9 % (ref 10–15)
ERYTHROCYTE [SEDIMENTATION RATE] IN BLOOD BY WESTERGREN METHOD: 36 MM/HR (ref 0–20)
GFR SERPL CREATININE-BSD FRML MDRD: 74 ML/MIN/1.73M2
GLUCOSE BLD-MCNC: 108 MG/DL (ref 70–125)
HCT VFR BLD AUTO: 44.2 % (ref 40–53)
HGB BLD-MCNC: 14.4 G/DL (ref 13.3–17.7)
IMM GRANULOCYTES # BLD: 0 10E3/UL
IMM GRANULOCYTES NFR BLD: 0 %
LYMPHOCYTES # BLD AUTO: 0.9 10E3/UL (ref 0.8–5.3)
LYMPHOCYTES NFR BLD AUTO: 15 %
MCH RBC QN AUTO: 28 PG (ref 26.5–33)
MCHC RBC AUTO-ENTMCNC: 32.6 G/DL (ref 31.5–36.5)
MCV RBC AUTO: 86 FL (ref 78–100)
MONOCYTES # BLD AUTO: 0.5 10E3/UL (ref 0–1.3)
MONOCYTES NFR BLD AUTO: 8 %
NEUTROPHILS # BLD AUTO: 4.2 10E3/UL (ref 1.6–8.3)
NEUTROPHILS NFR BLD AUTO: 69 %
PLATELET # BLD AUTO: 253 10E3/UL (ref 150–450)
POTASSIUM BLD-SCNC: 4.7 MMOL/L (ref 3.5–5)
PROT SERPL-MCNC: 8.4 G/DL (ref 6–8)
RBC # BLD AUTO: 5.14 10E6/UL (ref 4.4–5.9)
SODIUM SERPL-SCNC: 138 MMOL/L (ref 136–145)
WBC # BLD AUTO: 6.1 10E3/UL (ref 4–11)

## 2023-08-13 PROCEDURE — 86140 C-REACTIVE PROTEIN: CPT | Performed by: FAMILY MEDICINE

## 2023-08-13 PROCEDURE — 85025 COMPLETE CBC W/AUTO DIFF WBC: CPT | Performed by: FAMILY MEDICINE

## 2023-08-13 PROCEDURE — 99000 SPECIMEN HANDLING OFFICE-LAB: CPT | Performed by: FAMILY MEDICINE

## 2023-08-13 PROCEDURE — 36415 COLL VENOUS BLD VENIPUNCTURE: CPT | Performed by: FAMILY MEDICINE

## 2023-08-13 PROCEDURE — 99215 OFFICE O/P EST HI 40 MIN: CPT | Performed by: FAMILY MEDICINE

## 2023-08-13 PROCEDURE — 85652 RBC SED RATE AUTOMATED: CPT | Performed by: FAMILY MEDICINE

## 2023-08-13 PROCEDURE — 86757 RICKETTSIA ANTIBODY: CPT | Mod: 90 | Performed by: FAMILY MEDICINE

## 2023-08-13 PROCEDURE — 80053 COMPREHEN METABOLIC PANEL: CPT | Performed by: FAMILY MEDICINE

## 2023-08-13 RX ORDER — DOXYCYCLINE 100 MG/1
100 CAPSULE ORAL 2 TIMES DAILY
Qty: 20 CAPSULE | Refills: 0 | Status: SHIPPED | OUTPATIENT
Start: 2023-08-13 | End: 2023-08-23

## 2023-08-13 NOTE — PATIENT INSTRUCTIONS
CBC is reassuring, platelets have moved back up within the normal range.   Other labs pending.     Doxycycline twice daily for 7-10 days, at least 2-3 days after no fever and feeling better.     May continue the medications prescribed for itch and rash.     Follow up on Tuesday with primary care as planned.     To ER for severe abdominal pain or headache or vomiting or dehydration or fainting

## 2023-08-14 NOTE — PROGRESS NOTES
Assessment/Plan:   Petechial rash  Febrile illness  Systemic illness with low-grade fever, fatigue, myalgia, malaise, headache and diffuse maculopapular/urticarial and now petechial rash.    Broad differential including tick borne disease, viral illness, drug reaction, vasculitis, other internal infection.   Recent blood work with elevated CRP and decreasing platelets will be rechecked today.  We will also add on a metabolic panel to check liver function and kidney function.  We will also check Rickettsia Rickettsia due to risk of exposure for [place] spotted fever. Other tick borne labs pending or negative.   Check COVID tests at home.  We will empirically start doxycycline twice daily.  He will follow-up as planned with primary care in 2 days.  Continue triamcinolone and hydroxyzine as needed for itching.  We could consider adding Pepcid and/or switch to twice daily Zyrtec.  To emergency room if worse in the meantime - severe abdominal pain or headache or vomiting or dehydration or fainting.  - CBC with platelets and differential  - CRP, inflammation  - ESR: Erythrocyte sedimentation rate  - Rickettsia rickettsii antibody IgM  - Comprehensive metabolic panel (BMP + Alb, Alk Phos, ALT, AST, Total. Bili, TP)  - doxycycline hyclate (VIBRAMYCIN) 100 MG capsule; Take 1 capsule (100 mg) by mouth 2 times daily for 10 days  Dispense: 20 capsule; Refill: 0    I discussed red flag symptoms, return precautions to clinic/ER and follow up care with patient/parent.  Expected clinical course, symptomatic cares advised. Questions answered. Patient/parent amenable with plan.  Time: total time on day of visit 40 minutes spent in chart review, obtaining patient history and physical exam, reviewing labs, medication management, shared decision making and coordination of care.     CBC is reassuring, platelets have moved back up within the normal range.   Other labs pending.     Doxycycline twice daily for 7-10 days, at least 2-3  days after no fever and feeling better.     May continue the medications prescribed for itch and rash.     Follow up on Tuesday with primary care as planned.     To ER for severe abdominal pain or headache or vomiting or dehydration or fainting    Subjective:     Omi Grimm is a 57 year old male who presents for recheck of his ongoing rash.  The rash started a week ago Sunday evening or Monday the 6th - 7th of August.  It has been on both arms and both legs, and the torso.  It is very itchy.  Rashes been associated with low-grade fevers T max , diffuse body aches, fatigue.  He was seen 8/9/2023 with primary care.  Labs were checked for Lyme and ehrlichia, anaplasmosis, Babesia, CRP, sed rate and CBC.  The CRP was markedly elevated in the 50s.  CBC was fairly normal with a low normal white blood cell count and low normal platelets.  Sed rate was normal.  Lyme has come back negative.  He was treated with hydroxyzine for urticaria and triamcinolone cream topically.  He has developed some headache and mild runny nose over the last 2 days.  The rash is changed a little bit, on the legs it now looks more like red dots.  Still itchy.  He still feels fatigued low energy achy and loss of appetite.  Sometimes a little nausea.  No urinary symptoms or blood in the urine.  No vomiting diarrhea or constipation.  No one around him has been ill.  2 weeks prior to onset of the rash he returned from vacation in [place] where they were hiking in the [facility] [place].  He tried to go to work yesterday or Friday and became weak/faint feeling.    Allergies   Allergen Reactions    Penicillins Shortness Of Breath and Swelling    Dilaudid [Hydromorphone] Dizziness     Current Outpatient Medications   Medication    albuterol (VENTOLIN HFA) 108 (90 Base) MCG/ACT inhaler    doxycycline hyclate (VIBRAMYCIN) 100 MG capsule    esomeprazole (NEXIUM) 20 MG DR capsule    hydrOXYzine (ATARAX) 10 MG tablet    ibuprofen (ADVIL,MOTRIN)  200 MG tablet    levothyroxine (SYNTHROID/LEVOTHROID) 200 MCG tablet    meclizine (ANTIVERT) 25 MG tablet    triamcinolone (KENALOG) 0.1 % external cream     No current facility-administered medications for this visit.     Patient Active Problem List   Diagnosis    Multiple Environmental Allergies    Mild intermittent asthma    Pain In The Hands    Hypothyroidism    Graves' Disease    Allergic Rhinitis    Lump In / On The Skin    Malignant Melanoma Of The Back    Dysphagia    History of diverticulitis    Hypercalcemia    Asymptomatic gallstones    Urinary tract stones    Hyperparathyroidism (H)    Diverticulosis    S/P partial colectomy       Objective:     /80   Pulse 86   Temp 98.9  F (37.2  C) (Oral)   Resp 16   SpO2 98%     Physical    General Appearance: Alert, pleasant, no distress, AVSS  Head: Normocephalic, without obvious abnormality, atraumatic  Eyes: Conjunctivae are normal.   Ears: Normal TMs and external ear canals, both ears  Nose: No significant congestion.  Throat: Throat is normal.  No exudate.  No vesicular lesions  Neck: No adenopathy  Lungs: Clear to auscultation bilaterally, respirations unlabored  Heart: Regular rate and rhythm  Abdomen: Soft, non-tender, no masses, no organomegaly  Extremities: No lower extremity edema, normal capillary refill.  Skin: Diffuse maculopapular rash on his torso and upper arms.  On the arms and the legs it is increasingly developing a petechial nonblanching red macular appearance.  Still a little itchy, not painful.  Psychiatric: Patient has a normal mood and affect.         Results for orders placed or performed in visit on 08/13/23   CRP, inflammation     Status: Abnormal   Result Value Ref Range    CRP 7.9 (H) 0.0 - <0.8 mg/dL   ESR: Erythrocyte sedimentation rate     Status: Abnormal   Result Value Ref Range    Erythrocyte Sedimentation Rate 36 (H) 0 - 20 mm/hr   Comprehensive metabolic panel (BMP + Alb, Alk Phos, ALT, AST, Total. Bili, TP)      Status: Abnormal   Result Value Ref Range    Sodium 138 136 - 145 mmol/L    Potassium 4.7 3.5 - 5.0 mmol/L    Chloride 100 98 - 107 mmol/L    Carbon Dioxide (CO2) 27 22 - 31 mmol/L    Anion Gap 11 5 - 18 mmol/L    Urea Nitrogen 19 8 - 22 mg/dL    Creatinine 1.16 0.70 - 1.30 mg/dL    Calcium 10.4 8.5 - 10.5 mg/dL    Glucose 108 70 - 125 mg/dL    Alkaline Phosphatase 74 45 - 120 U/L    AST 16 0 - 40 U/L    ALT 15 0 - 45 U/L    Protein Total 8.4 (H) 6.0 - 8.0 g/dL    Albumin 3.6 3.5 - 5.0 g/dL    Bilirubin Total 0.5 0.0 - 1.0 mg/dL    GFR Estimate 74 >60 mL/min/1.73m2   CBC with platelets and differential     Status: None   Result Value Ref Range    WBC Count 6.1 4.0 - 11.0 10e3/uL    RBC Count 5.14 4.40 - 5.90 10e6/uL    Hemoglobin 14.4 13.3 - 17.7 g/dL    Hematocrit 44.2 40.0 - 53.0 %    MCV 86 78 - 100 fL    MCH 28.0 26.5 - 33.0 pg    MCHC 32.6 31.5 - 36.5 g/dL    RDW 12.9 10.0 - 15.0 %    Platelet Count 253 150 - 450 10e3/uL    % Neutrophils 69 %    % Lymphocytes 15 %    % Monocytes 8 %    % Eosinophils 7 %    % Basophils 1 %    % Immature Granulocytes 0 %    Absolute Neutrophils 4.2 1.6 - 8.3 10e3/uL    Absolute Lymphocytes 0.9 0.8 - 5.3 10e3/uL    Absolute Monocytes 0.5 0.0 - 1.3 10e3/uL    Absolute Eosinophils 0.4 0.0 - 0.7 10e3/uL    Absolute Basophils 0.0 0.0 - 0.2 10e3/uL    Absolute Immature Granulocytes 0.0 <=0.4 10e3/uL   CBC with platelets and differential     Status: None    Narrative    The following orders were created for panel order CBC with platelets and differential.  Procedure                               Abnormality         Status                     ---------                               -----------         ------                     CBC with platelets and d...[[id number]]                      Final result                 Please view results for these tests on the individual orders.       This note has been dictated in part using voice recognition software.  Any grammatical or context  distortions are unintentional and inherent to the software.  Please feel free to contact me directly for clarification if needed.

## 2023-08-15 ENCOUNTER — OFFICE VISIT (OUTPATIENT)
Dept: FAMILY MEDICINE | Facility: CLINIC | Age: 58
End: 2023-08-15
Payer: COMMERCIAL

## 2023-08-15 VITALS
HEART RATE: 82 BPM | TEMPERATURE: 98 F | WEIGHT: 179 LBS | SYSTOLIC BLOOD PRESSURE: 123 MMHG | DIASTOLIC BLOOD PRESSURE: 81 MMHG | BODY MASS INDEX: 24.87 KG/M2 | OXYGEN SATURATION: 100 % | RESPIRATION RATE: 13 BRPM

## 2023-08-15 DIAGNOSIS — B34.9 VIRAL ILLNESS: Primary | ICD-10-CM

## 2023-08-15 DIAGNOSIS — A77.0 RMSF (ROCKY MOUNTAIN SPOTTED FEVER): ICD-10-CM

## 2023-08-15 PROCEDURE — 99214 OFFICE O/P EST MOD 30 MIN: CPT | Performed by: FAMILY MEDICINE

## 2023-08-15 ASSESSMENT — PAIN SCALES - GENERAL: PAINLEVEL: NO PAIN (0)

## 2023-08-15 NOTE — PROGRESS NOTES
Dr. Petersen requested writer call infectious disease clinic regarding urgent referral sent. Patient may have [place] Spotted Fever. Lab was competed and sent to [place].    Antonina from infectious disease answered phone and spoke with the nursing staff and they do have appointment on:  Monday 8:20 AM - [phone] [facility] 2nd Floor [address] Dr. Vincent Munoz.     Patient was informed of this and agrees with plan.    Sabrina Yepez, LUTHERN, RN  [facility]

## 2023-08-15 NOTE — PROGRESS NOTES
Assessment & Plan     Viral illness  Patient may continue with his hydroxyzine and his triamcinolone cream but definitely should not work until we know the results of his PCR [place] spotted fever test; a referral was sent to infectious disease and the appointment will be within 7 days.  He was started on doxycycline and was given a 10-day course and he will continue that  - Infectious Disease Referral; Future    RMSF ([place] spotted fever)  This diagnosis is pending a PCR definitive test which was mailed to [place] but should have the results back within 24 to 48 hours from today                 Kane Petersen MD  [facility]    Haile Braga is a 57 year old, presenting for the following health issues:  Derm Problem (Follow up )      8/15/2023     9:54 AM   Additional Questions   Roomed by Kelsy   Accompanied by bernie ALLEN   Omi is seen back here accompanied by his wife for follow-up of a probable tickborne viral illness which occurred approximately 3 weeks ago with development of symptoms about 10 days ago.  He was seen with a petechial rash which was urticarial and some malaise low-grade temp and arthralgias.  Work-up for Ehrlichia Babesiosis Lyme negative; CBC low normal sed rate up CRP up; patient continued to decline with increasing arthralgias and fatigue although he did work this past Saturday.  Went to urgent care on Sunday was seen and evaluated and a test for [place] spotted fever was ordered and sent to [place] where results are pending.  Patient was empirically started on doxycycline.  Patient states perhaps slight improvement but continues with urticaria.  I placed a urgent referral to infectious disease for further evaluation if PCR [place] spotted fever is negative.  We may elect to cancel depending on clinical course and time evaluation.  He will hydrate himself and not work and we will keep in close touch with the patient and his  wife.          Review of Systems   Constitutional, HEENT, cardiovascular, pulmonary, gi and gu systems are negative, except as otherwise noted.      Objective    /81 (BP Location: Right arm, Patient Position: Sitting, Cuff Size: Adult Regular)   Pulse 82   Temp 98  F (36.7  C) (Oral)   Resp 13   Wt 81.2 kg (179 lb)   SpO2 100%   BMI 24.87 kg/m    Body mass index is 24.87 kg/m .  Physical Exam   GENERAL: healthy, alert and no distress  NECK: no adenopathy, no asymmetry, masses, or scars and thyroid normal to palpation  RESP: lungs clear to auscultation - no rales, rhonchi or wheezes  CV: regular rate and rhythm, normal S1 S2, no S3 or S4, no murmur, click or rub, no peripheral edema and peripheral pulses strong  ABDOMEN: soft, nontender, no hepatosplenomegaly, no masses and bowel sounds normal  MS: no gross musculoskeletal defects noted, no edema    Skin-she does have urticarial red macular rash on lower extremities thighs back and on his scalp which is essentially unchanged from my exam of 1 week ago and apparent clinical exam and observation by colleague on Sunday.

## 2023-08-15 NOTE — PROGRESS NOTES
Answers submitted by the patient for this visit:  General Questionnaire (Submitted on 8/9/2023)  Chief Complaint: Chronic problems general questions HPI Form  What is the reason for your visit today? : rash  How many servings of fruits and vegetables do you eat daily?: 0-1  On average, how many sweetened beverages do you drink each day (Examples: soda, juice, sweet tea, etc.  Do NOT count diet or artificially sweetened beverages)?: 1  How many minutes a day do you exercise enough to make your heart beat faster?: 30 to 60  How many days a week do you exercise enough to make your heart beat faster?: 7  How many days per week do you miss taking your medication?: 0

## 2023-08-16 ENCOUNTER — OFFICE VISIT (OUTPATIENT)
Dept: INFECTIOUS DISEASES | Facility: CLINIC | Age: 58
End: 2023-08-16
Payer: COMMERCIAL

## 2023-08-16 ENCOUNTER — MYC MEDICAL ADVICE (OUTPATIENT)
Dept: FAMILY MEDICINE | Facility: CLINIC | Age: 58
End: 2023-08-16

## 2023-08-16 VITALS
OXYGEN SATURATION: 95 % | HEART RATE: 99 BPM | SYSTOLIC BLOOD PRESSURE: 128 MMHG | DIASTOLIC BLOOD PRESSURE: 86 MMHG | TEMPERATURE: 99 F

## 2023-08-16 DIAGNOSIS — L28.2 PRURITIC RASH: ICD-10-CM

## 2023-08-16 PROCEDURE — 99204 OFFICE O/P NEW MOD 45 MIN: CPT | Performed by: INTERNAL MEDICINE

## 2023-08-16 RX ORDER — PREDNISONE 20 MG/1
20 TABLET ORAL DAILY
Qty: 10 TABLET | Refills: 0 | Status: SHIPPED | OUTPATIENT
Start: 2023-08-16 | End: 2023-08-26

## 2023-08-16 NOTE — TELEPHONE ENCOUNTER
Spoke to Fely at ID and she stated that per Nurse Villanueva: they do not have any opening today and pt would have to go ER. Then stated they are able to get pt in today at 2:40 PM with Dr Auguste with 2:25 PM arrive time.     Will let pt know.    Deandra Whitten, LUTHERN, RN  [facility]

## 2023-08-16 NOTE — TELEPHONE ENCOUNTER
Called and informed pt of info. He verbalized understanding. No other questions at this time.    LUTHER DcN, RN  [facility]

## 2023-08-16 NOTE — PROGRESS NOTES
General ID Service Consult      Patient: Omi Grimm  YOB: 1965, MRN: [id number]  Date of Admission:  (Not on file)  Date of Consult: 08/16/2023  Consult Requested by: No att. providers found  Admission Diagnosis: Viral illness [B34.9]      ID Assessment & Plan       Raised rash, very pruritic, some nodular  Prurigo nodularis?  Doubt tick related illness with no thrombocytopenia or transaminitis (and this appearance)  Serology for Anaplasma Ehrlichia PCR, Babesia antibody, Lyme antibody negative  Adult chickenpox can cause pruritus but I do not see the telltale vesicles  Does not look like vasculitis    PLAN  Start prednisone 20 mg daily x7 days  If rash improves finish course  If syx worsen,or new syx, call immediately  Asked him to call his dermatologist for potential biopsy   Hepatitis serology pending (LFTs not consistent with acute hepatitis)    Jeffrey Auguste MD    ______________________________________________________________________        History of Present Illness   57 years old male referred for rash  He was in [place], with wife, from 7/17- 7/23, hiking, water falls, no camping out  No hx urticaria  Work- [employer] a [employer]  Started feeling different 8/5  Itchy, loss of appetite, no energy, cold, body aches,  Low grade temps 99s...  Few headache, not bad  No pulmonary issues  Nausea no diarrhea  One dog    Saw doc given atarax, rash was itchy  Doxy started before 8/13 at urgent care, at [facility]    Then rash became raised, very bad itching preventing sleep despite atarax and topical steroid    Hx of melanoma on back, remission        Review of Systems   The 10 point Review of Systems is negative other than noted in the HPI or here.     Past Medical History    Past Medical History:   Diagnosis Date    Acute bronchitis     Allergic rhinitis     Asthma     Asymptomatic gallstones 7/31/2017    Diverticulitis     Diverticulosis 1/16/2020    Added automatically from  request for surgery [id number]     Dysphagia     Created by Conversion     Graves disease     Graves' Disease     Created by Conversion Replacement Utility updated for latest IMO load     History of diverticulitis 2/26/2016    Hypercalcemia     Hyperparathyroidism (H) 8/9/2018    Hypertension     Hypothyroidism     Created by Conversion Replacement Utility updated for latest IMO load     Kidney stone 11/06/2017    first stone at age 52    Lump In / On The Skin     Created by Conversion     Malignant melanoma of skin of trunk, except scrotum (H)     removed from back 2015    Mild intermittent asthma     Created by Conversion     Multiple Environmental Allergies     Created by Conversion [facility] Annotation: Jan 11 2010 12:36PM - Farideh Gilliland: DOGS     Pain In The Hands     Created by Conversion     PONV (postoperative nausea and vomiting)     S/P partial colectomy 2/14/2020    Unspecified hypothyroidism     Urinary tract stones 7/31/2017       Past Surgical History   Past Surgical History:   Procedure Laterality Date    BIOPSY SKIN (LOCATION)      EYE SURGERY      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    HEMANGIOMA EXCISION      as an infant    HERNIORRHAPHY, INCISIONAL, ROBOT-ASSISTED, LAPAROSCOPIC, USING DA CHRIST XI Right 12/22/2022    Procedure: HERNIORRHAPHY, INCISIONAL, ROBOT-ASSISTED, LAPAROSCOPIC, USING DA CHRIST XI;  Surgeon: Jefferson Rodriguez DO;  Location: [facility]    OTHER SURGICAL HISTORY      wrist surgery, right    MI BX/REMV,LYMPH NODE,DEEP AXILL Left 6/5/2014    Procedure:  AXILLARY LYMPH NODE DISSECTION-LEFT;  Surgeon: Sergey Glover MD;  Location: [facility];  Service: General    MI CYSTO/URETERO W/LITHOTRIPSY &INDWELL STENT INSRT Left 11/14/2017    Procedure: CYSTOSCOPY, WITH LEFT URETEROSCOPY,  LASER LITHOTRIPSY STONE EXTRACTION STENT INSERTION;  Surgeon: González Lagos MD;  Location: [facility];  Service: Urology    MI EXCIS SUPRATENT MENINGIOMA       Description: Craniotomy Supratentorial Excision Of Meningioma;  Recorded: 02/06/2013;  Comments: Excision of frontal facial meningioma at age 6 months (residual ptosis right eye)    VT LAP,SURG,COLECTOMY, PARTIAL, W/ANAST N/A 2/14/2020    Procedure: COLECTOMY, SIGMOID, ROBOT-ASSISTED, LAPAROSCOPIC, USING DA CHRIST XI;  Surgeon: Jefferson Rodriguez DO;  Location: [facility];  Service: General    SINUS SURGERY      polyposis    SINUS SURGERY  April 2011 - (x2)    polyp removal x2    THYROIDECTOMY, PARTIAL N/A 10/9/2018    Procedure: PARATHYROIDECTOMY 23HR;  Surgeon: Vincent Linn MD;  Location[facility];  Service:     VASECTOMY  12/2011    WRIST SURGERY  2003       Social History   Social History     Tobacco Use    Smoking status: Never    Smokeless tobacco: Never   Vaping Use    Vaping Use: Never used   Substance Use Topics    Alcohol use: No    Drug use: No       Family History   I have reviewed this patient's family history and updated it with pertinent information if needed.  Family History   Problem Relation Age of Onset    Hypertension Mother     Heart Disease Mother         pacemaker    Lung Cancer Mother     Cancer Maternal Aunt         lung    Urolithiasis Maternal Uncle     LUNG DISEASE Maternal Uncle         asthma    Pancreatitis Father     Congenital heart disease Daughter        Medications   I have reviewed this patient's current medications    Allergies   Allergies   Allergen Reactions    Penicillins Shortness Of Breath and Swelling    Dilaudid [Hydromorphone] Dizziness       Physical Exam   Vital Signs: Temp: 99  F (37.2  C)   BP: 128/86 Pulse: 99     SpO2: 95 %      Weight: 0 lbs 0 oz  Gen. appearance nontoxic  Eyes no conjunctivitis or icterus; chronic drooping right eyelid  Neck no stiffness or neck vein distention, no LN  Heart  no S3 or murmurs  Lungs clear no wheeze  Abdomen soft not tender  Extremities no synovitis, trace edema  Skin raised rash, some nodular especially on the  neck.  Present on the trunk less so on the lower extremities  Neurologic alert oriented no focal deficits      Data   Inflammatory Markers   Recent Labs   Lab Test 08/13/23  0937 08/09/23  0816 10/17/18  0919   SED 36* 20 49*   CRPI  --  51.90*  --         Hematology Studies   Recent Labs   Lab Test 08/13/23  0937 08/09/23  0816 09/26/22  1429 10/04/21  0931 08/14/20  0757 02/14/20  1403 02/10/20  1447   WBC 6.1 5.5 6.4 4.6 4.8  --  6.4   HGB 14.4 14.8 14.7 15.4 14.8  --  15.3   MCV 86 84 85 86 87  --  87    188 253 227 206   < > 278    < > = values in this interval not displayed.       Metabolic Studies   Recent Labs   Lab Test 08/13/23  0937 09/26/22  1429 10/04/21  0931 08/14/20  0757 02/10/20  1447 08/12/19  0959    137 141 138 141 140   POTASSIUM 4.7 4.0 4.2  --  4.6 3.9   CHLORIDE 100 103 103 102 103 105   CO2 27 25 28 26 29 25   BUN 19 16 15 16 15 14   CR 1.16 1.06 1.18 1.08 0.95 1.01   GFRESTIMATED 74 83 70 >60 >60 >60       Hepatic Studies    Recent Labs   Lab Test 08/13/23  0937 09/26/22  1429 10/04/21  0931 08/14/20  0757 02/10/20  1447 08/12/19  0959 02/11/19  0814   BILITOTAL 0.5 0.4 0.5 0.5 0.5 0.7 0.5   ALKPHOS 74 68 65 61 65 63 77   ALBUMIN 3.6 3.7 3.8 4.0 4.0 4.0 4.0   AST 16 19 23  --  20 16 19   ALT 15 18 21 20 20 14 19       Most Recent 6 Bacteria Isolates From Any Culture (See EPIC Reports for Culture Details):No lab results found.    Urine Studies    Recent Labs   Lab Test 04/13/19  0829 01/15/18  0822   LEUKEST Negative Negative       Vancomycin Levels  No lab results found.    Invalid input(s): VANCO    Hepatitis B Testing No lab results found.  Hepatitis C Testing   No results found for: HCVAB, HQTG, HCGENO, HCPCR, HQTRNA, HEPRNA  HIVTesting No lab results found.    Respiratory Virus Testing    No results found for: RS, FLUAG  COVID-19 Antibody Results, Testing for Immunity           No data to display              COVID-19 PCR Results           No data to display

## 2023-08-16 NOTE — TELEPHONE ENCOUNTER
Huddled with Dr Petersen and per Dr Petersen, call ID to see if pt could be in today to be seen. If not, if pt goes to ER, will someone from ID be able to see pt right away today?    Deandra Whitten, LUTHERN, RN  [facility]

## 2023-08-17 LAB — R RICKETTSI IGM TITR SER IF: NORMAL {TITER}

## 2023-08-18 ENCOUNTER — TELEPHONE (OUTPATIENT)
Dept: INFECTIOUS DISEASES | Facility: CLINIC | Age: 58
End: 2023-08-18

## 2023-08-18 NOTE — TELEPHONE ENCOUNTER
Patient called.   He is calling to report that he is doing so much better since he started on prednisone. He said that the rash and swelling has gone down.

## 2023-08-18 NOTE — TELEPHONE ENCOUNTER
Spoke to pt states temp is down, itching has gone down, bumps have gone down as well. No new symptoms as well.

## 2023-09-18 ENCOUNTER — HOSPITAL ENCOUNTER (OUTPATIENT)
Dept: CT IMAGING | Facility: CLINIC | Age: 58
Discharge: HOME | End: 2023-09-18
Attending: INTERNAL MEDICINE | Admitting: INTERNAL MEDICINE
Payer: COMMERCIAL

## 2023-09-18 DIAGNOSIS — C43.59 MALIGNANT MELANOMA OF SKIN OF TRUNK, EXCEPT SCROTUM (H): ICD-10-CM

## 2023-09-18 PROCEDURE — 250N000011 HC RX IP 250 OP 636: Mod: JZ | Performed by: INTERNAL MEDICINE

## 2023-09-18 PROCEDURE — 74177 CT ABD & PELVIS W/CONTRAST: CPT

## 2023-09-18 RX ORDER — IOPAMIDOL 755 MG/ML
90 INJECTION, SOLUTION INTRAVASCULAR ONCE
Status: COMPLETED | OUTPATIENT
Start: 2023-09-18 | End: 2023-09-18

## 2023-09-18 RX ADMIN — IOPAMIDOL 90 ML: 755 INJECTION, SOLUTION INTRAVENOUS at 07:24

## 2023-09-25 ENCOUNTER — ONCOLOGY VISIT (OUTPATIENT)
Dept: ONCOLOGY | Facility: CLINIC | Age: 58
End: 2023-09-25
Attending: INTERNAL MEDICINE
Payer: COMMERCIAL

## 2023-09-25 ENCOUNTER — LAB (OUTPATIENT)
Dept: INFUSION THERAPY | Facility: CLINIC | Age: 58
End: 2023-09-25
Attending: INTERNAL MEDICINE
Payer: COMMERCIAL

## 2023-09-25 VITALS
DIASTOLIC BLOOD PRESSURE: 90 MMHG | HEIGHT: 72 IN | SYSTOLIC BLOOD PRESSURE: 138 MMHG | HEART RATE: 69 BPM | BODY MASS INDEX: 24.28 KG/M2 | OXYGEN SATURATION: 100 %

## 2023-09-25 DIAGNOSIS — C43.59 MALIGNANT MELANOMA OF SKIN OF TRUNK, EXCEPT SCROTUM (H): Primary | ICD-10-CM

## 2023-09-25 DIAGNOSIS — C43.59 MALIGNANT MELANOMA OF SKIN OF TRUNK, EXCEPT SCROTUM (H): ICD-10-CM

## 2023-09-25 LAB
ALBUMIN SERPL BCG-MCNC: 4.3 G/DL (ref 3.5–5.2)
ALP SERPL-CCNC: 68 U/L (ref 40–129)
ALT SERPL W P-5'-P-CCNC: 15 U/L (ref 0–70)
ANION GAP SERPL CALCULATED.3IONS-SCNC: 9 MMOL/L (ref 7–15)
AST SERPL W P-5'-P-CCNC: 20 U/L (ref 0–45)
BASOPHILS # BLD AUTO: 0 10E3/UL (ref 0–0.2)
BASOPHILS NFR BLD AUTO: 1 %
BILIRUB SERPL-MCNC: 0.5 MG/DL
BUN SERPL-MCNC: 16.8 MG/DL (ref 6–20)
CALCIUM SERPL-MCNC: 9.6 MG/DL (ref 8.6–10)
CHLORIDE SERPL-SCNC: 101 MMOL/L (ref 98–107)
CREAT SERPL-MCNC: 0.97 MG/DL (ref 0.67–1.17)
DEPRECATED HCO3 PLAS-SCNC: 28 MMOL/L (ref 22–29)
EGFRCR SERPLBLD CKD-EPI 2021: >90 ML/MIN/1.73M2
EOSINOPHIL # BLD AUTO: 0.1 10E3/UL (ref 0–0.7)
EOSINOPHIL NFR BLD AUTO: 2 %
ERYTHROCYTE [DISTWIDTH] IN BLOOD BY AUTOMATED COUNT: 13.8 % (ref 10–15)
GLUCOSE SERPL-MCNC: 71 MG/DL (ref 70–99)
HCT VFR BLD AUTO: 44.5 % (ref 40–53)
HGB BLD-MCNC: 14.5 G/DL (ref 13.3–17.7)
IMM GRANULOCYTES # BLD: 0 10E3/UL
IMM GRANULOCYTES NFR BLD: 0 %
LYMPHOCYTES # BLD AUTO: 1.2 10E3/UL (ref 0.8–5.3)
LYMPHOCYTES NFR BLD AUTO: 22 %
MCH RBC QN AUTO: 27.5 PG (ref 26.5–33)
MCHC RBC AUTO-ENTMCNC: 32.6 G/DL (ref 31.5–36.5)
MCV RBC AUTO: 84 FL (ref 78–100)
MONOCYTES # BLD AUTO: 0.5 10E3/UL (ref 0–1.3)
MONOCYTES NFR BLD AUTO: 8 %
NEUTROPHILS # BLD AUTO: 3.8 10E3/UL (ref 1.6–8.3)
NEUTROPHILS NFR BLD AUTO: 67 %
NRBC # BLD AUTO: 0 10E3/UL
NRBC BLD AUTO-RTO: 0 /100
PLATELET # BLD AUTO: 261 10E3/UL (ref 150–450)
POTASSIUM SERPL-SCNC: 3.9 MMOL/L (ref 3.4–5.3)
PROT SERPL-MCNC: 7.4 G/DL (ref 6.4–8.3)
RBC # BLD AUTO: 5.28 10E6/UL (ref 4.4–5.9)
SODIUM SERPL-SCNC: 138 MMOL/L (ref 136–145)
WBC # BLD AUTO: 5.6 10E3/UL (ref 4–11)

## 2023-09-25 PROCEDURE — 36415 COLL VENOUS BLD VENIPUNCTURE: CPT

## 2023-09-25 PROCEDURE — 99213 OFFICE O/P EST LOW 20 MIN: CPT | Performed by: INTERNAL MEDICINE

## 2023-09-25 PROCEDURE — G0463 HOSPITAL OUTPT CLINIC VISIT: HCPCS | Performed by: INTERNAL MEDICINE

## 2023-09-25 PROCEDURE — 80053 COMPREHEN METABOLIC PANEL: CPT

## 2023-09-25 PROCEDURE — 85025 COMPLETE CBC W/AUTO DIFF WBC: CPT

## 2023-09-25 ASSESSMENT — PAIN SCALES - GENERAL: PAINLEVEL: NO PAIN (0)

## 2023-09-25 NOTE — LETTER
9/25/2023         RE: Omi Grimm  [address]  [place] [address]        Dear Colleague,    Thank you for referring your patient, Omi Grimm, to the [facility]. Please see a copy of my visit note below.    Oncology Rooming Note    September 25, 2023 1:45 PM   Omi Grimm is a 57 year old male who presents for:    Chief Complaint   Patient presents with     Oncology Clinic Visit     Malignant Melanoma Of The Back     Initial Vitals: BP (!) 138/90   Pulse 69   Ht 1.829 m (6')   SpO2 100%   BMI 24.28 kg/m   Estimated body mass index is 24.28 kg/m  as calculated from the following:    Height as of this encounter: 1.829 m (6').    Weight as of 8/15/23: 81.2 kg (179 lb). Body surface area is 2.03 meters squared.  No Pain (0) Comment: Data Unavailable   No LMP for male patient.  Allergies reviewed: Yes  Medications reviewed: Yes    Medications: Medication refills not needed today.  Pharmacy name entered into [employer]: [facility] - [place], [place] - [address]    Clinical concerns:       Cristian Dolan RN              [facility] Progress Note    Patient: Omi Grimm  MRN: [id number]  Date of Service: Sep 25, 2023        Assessment and Plan:    1.  Melanoma: He is now just over 9 years out from his definitive treatment.  He recently had a CT scan.  I personally reviewed the images and shared them with Omi and his wife.  There is no evidence of recurrence.  At this point I think his risk of recurrent disease is quite low.  He no longer needs surveillance imaging.  We will see him back in our clinic only on an as-needed basis.  I reminded him to continue yearly follow-up with dermatology.    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 Stage [id number]. IIC. Diagnosed June, 2014.     Treatment:    Wide excision and sentinel node biopsy in June 5, 2014. No adjuvant  therapy was given.    Interim History:    Omi returns today for a follow-up visit.  Doing okay today.  No acute complaints.  Generally has been doing okay since his last visit over the year.    Review of Systems:    As above in the history.     Review of Systems otherwise Negative for:  General: chills, fever or night sweats  Psychological: anxiety or depression  Ophthalmic: blurry vision, double vision or loss of vision, vision change  ENT: epistaxis, oral lesions, hearing changes  Hematological and Lymphatic: bleeding, bruising, jaundice, swollen lymph nodes  Endocrine: hot flashes, unexpected weight changes  Respiratory: cough, hemoptysis, orthopnea or shortness of breath/BUTT  Cardiovascular: chest pain, edema, palpitations or PND  Gastrointestinal: abdominal pain, blood in stools, change in bowel habits, constipation, diarrhea or nausea/vomiting  Genito-Urinary: change in urinary stream, incontinence, frequency/urgency  Musculoskeletal: joint pain, stiffness, swelling, muscle pain  Neurological: dizziness, headaches, numbness/tingling  Dermatological: lumps and rash    Past History:    Past Medical History:   Diagnosis Date     Acute bronchitis      Allergic rhinitis      Asthma      Asymptomatic gallstones 7/31/2017     Diverticulitis      Diverticulosis 1/16/2020    Added automatically from request for surgery [id number]      Dysphagia     Created by Conversion      Graves disease      Graves' Disease     Created by Conversion Replacement Utility updated for latest IMO load      History of diverticulitis 2/26/2016     Hypercalcemia      Hyperparathyroidism (H) 8/9/2018     Hypertension      Hypothyroidism     Created by Conversion Replacement Utility updated for latest IMO load      Kidney stone 11/06/2017    first stone at age 52     Lump In / On The Skin     Created by Conversion      Malignant melanoma of skin of trunk, except scrotum (H)     removed from back 2015     Mild intermittent asthma     Created by  Conversion      Multiple Environmental Allergies     Created by Conversion [facility] Annotation: Jan 11 2010 12:36PM - Farideh Gilliland: DOGS      Pain In The Hands     Created by Conversion      PONV (postoperative nausea and vomiting)      S/P partial colectomy 2/14/2020     Unspecified hypothyroidism      Urinary tract stones 7/31/2017     Physical Exam:    BP (!) 138/90   Pulse 69   Ht 1.829 m (6')   SpO2 100%   BMI 24.28 kg/m      General: patient appears stated age of 55 year old. Nontoxic and in no distress.   HEENT: Head: atraumatic, normocephalic. Sclerae anicteric.  Chest:  Normal respiratory effort  Cardiac:  No edema.   Abdomen: abdomen is non-distended  Extremities: normal tone and muscle bulk.  Skin: no lesions or rash on visible skin. Warm and dry.   CNS: alert and oriented. Grossly non-focal.   Psychiatric: normal mood and affect.     Lab Results:    Recent Results (from the past 168 hour(s))   Comprehensive metabolic panel   Result Value Ref Range    Sodium 138 136 - 145 mmol/L    Potassium 3.9 3.4 - 5.3 mmol/L    Carbon Dioxide (CO2) 28 22 - 29 mmol/L    Anion Gap 9 7 - 15 mmol/L    Urea Nitrogen 16.8 6.0 - 20.0 mg/dL    Creatinine 0.97 0.67 - 1.17 mg/dL    GFR Estimate >90 >60 mL/min/1.73m2    Calcium 9.6 8.6 - 10.0 mg/dL    Chloride 101 98 - 107 mmol/L    Glucose 71 70 - 99 mg/dL    Alkaline Phosphatase 68 40 - 129 U/L    AST 20 0 - 45 U/L    ALT 15 0 - 70 U/L    Protein Total 7.4 6.4 - 8.3 g/dL    Albumin 4.3 3.5 - 5.2 g/dL    Bilirubin Total 0.5 <=1.2 mg/dL   CBC with platelets and differential   Result Value Ref Range    WBC Count 5.6 4.0 - 11.0 10e3/uL    RBC Count 5.28 4.40 - 5.90 10e6/uL    Hemoglobin 14.5 13.3 - 17.7 g/dL    Hematocrit 44.5 40.0 - 53.0 %    MCV 84 78 - 100 fL    MCH 27.5 26.5 - 33.0 pg    MCHC 32.6 31.5 - 36.5 g/dL    RDW 13.8 10.0 - 15.0 %    Platelet Count 261 150 - 450 10e3/uL    % Neutrophils 67 %    % Lymphocytes 22 %    % Monocytes 8 %    % Eosinophils 2 %    %  Basophils 1 %    % Immature Granulocytes 0 %    NRBCs per 100 WBC 0 <1 /100    Absolute Neutrophils 3.8 1.6 - 8.3 10e3/uL    Absolute Lymphocytes 1.2 0.8 - 5.3 10e3/uL    Absolute Monocytes 0.5 0.0 - 1.3 10e3/uL    Absolute Eosinophils 0.1 0.0 - 0.7 10e3/uL    Absolute Basophils 0.0 0.0 - 0.2 10e3/uL    Absolute Immature Granulocytes 0.0 <=0.4 10e3/uL    Absolute NRBCs 0.0 10e3/uL     Imaging:    CT Chest/Abdomen/Pelvis w Contrast    Result Date: 9/18/2023  EXAM: CT CHEST/ABDOMEN/PELVIS W CONTRAST LOCATION: [facility] DATE: 9/18/2023 INDICATION: Stage II C melanoma of his back diagnosed June 2014. Posttreatment surveillance. COMPARISON: 09/19/2022 and older studies going back to 2019. TECHNIQUE: CT scan of the chest, abdomen, and pelvis was performed following injection of IV contrast. Multiplanar reformats were obtained. Dose reduction techniques were used. CONTRAST: Isovue 370 90ml FINDINGS: LUNGS AND PLEURA: No suspicious pulmonary nodules or effusions. MEDIASTINUM/AXILLAE: No suspicious adenopathy. Few less than 1 cm left axillary nodes are stable. There is a very small hiatal hernia. CORONARY ARTERY CALCIFICATION: None. HEPATOBILIARY: Mild, diffuse fatty infiltration of the liver. There is a large dependent gallstone. PANCREAS: Normal. SPLEEN: Normal. ADRENAL GLANDS: Normal. KIDNEYS/BLADDER: Normal. BOWEL: Sigmoid staple line. Diffuse colonic diverticulosis. No ascites or peritoneal tumor nodules. LYMPH NODES: Normal. VASCULATURE: Mild arterial calcifications. PELVIC ORGANS: Prior vascectomy. MUSCULOSKELETAL: Moderate spondylitic changes at L5-S1. No suspicious lesions. Presumed postsurgical changes in the left lower paraspinal thoracic region with skin thickening and minimal scarring (axial image 78) is unchanged.     IMPRESSION: 1.  No evidence of recurrent tumor in the chest, abdomen or pelvis. 2.  There is a small hiatal hernia. 3.  Mild hepatic steatosis. 4.  Cholelithiasis. 5.   Diffuse colonic diverticulosis with sigmoid staple line.       Signed by: Kane Mann MD      Again, thank you for allowing me to participate in the care of your patient.        Sincerely,        Kane Mann MD

## 2023-09-25 NOTE — PROGRESS NOTES
Oncology Rooming Note    September 25, 2023 1:45 PM   Omi Grimm is a 57 year old male who presents for:    Chief Complaint   Patient presents with    Oncology Clinic Visit     Malignant Melanoma Of The Back     Initial Vitals: BP (!) 138/90   Pulse 69   Ht 1.829 m (6')   SpO2 100%   BMI 24.28 kg/m   Estimated body mass index is 24.28 kg/m  as calculated from the following:    Height as of this encounter: 1.829 m (6').    Weight as of 8/15/23: 81.2 kg (179 lb). Body surface area is 2.03 meters squared.  No Pain (0) Comment: Data Unavailable   No LMP for male patient.  Allergies reviewed: Yes  Medications reviewed: Yes    Medications: Medication refills not needed today.  Pharmacy name entered into [employer]: [facility] - [place], [place] - [address]    Clinical concerns:       Cristian Dolan RN

## 2023-09-26 NOTE — PROGRESS NOTES
[facility]ology [facility] On[facility] Progress Note    Patient: Omi Grimm  MRN: [id number]  Date of Service: Sep 25, 2023        Assessment and Plan:    1.  Melanoma: He is now just over 9 years out from his definitive treatment.  He recently had a CT scan.  I personally reviewed the images and shared them with Omi and his wife.  There is no evidence of recurrence.  At this point I think his risk of recurrent disease is quite low.  He no longer needs surveillance imaging.  We will see him back in our clinic only on an as-needed basis.  I reminded him to continue yearly follow-up with dermatology.    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 Stage [id number]. IIC. Diagnosed June, 2014.     Treatment:    Wide excision and sentinel node biopsy in June 5, 2014. No adjuvant therapy was given.    Interim History:    Omi returns today for a follow-up visit.  Doing okay today.  No acute complaints.  Generally has been doing okay since his last visit over the year.    Review of Systems:    As above in the history.     Review of Systems otherwise Negative for:  General: chills, fever or night sweats  Psychological: anxiety or depression  Ophthalmic: blurry vision, double vision or loss of vision, vision change  ENT: epistaxis, oral lesions, hearing changes  Hematological and Lymphatic: bleeding, bruising, jaundice, swollen lymph nodes  Endocrine: hot flashes, unexpected weight changes  Respiratory: cough, hemoptysis, orthopnea or shortness of breath/BUTT  Cardiovascular: chest pain, edema, palpitations or PND  Gastrointestinal: abdominal pain, blood in stools, change in bowel habits, constipation, diarrhea or nausea/vomiting  Genito-Urinary: change in urinary stream, incontinence, frequency/urgency  Musculoskeletal: joint pain, stiffness, swelling, muscle pain  Neurological: dizziness, headaches, numbness/tingling  Dermatological: lumps and rash    Past  History:    Past Medical History:   Diagnosis Date    Acute bronchitis     Allergic rhinitis     Asthma     Asymptomatic gallstones 7/31/2017    Diverticulitis     Diverticulosis 1/16/2020    Added automatically from request for surgery [id number]     Dysphagia     Created by Conversion     Graves disease     Graves' Disease     Created by Conversion Replacement Utility updated for latest IMO load     History of diverticulitis 2/26/2016    Hypercalcemia     Hyperparathyroidism (H) 8/9/2018    Hypertension     Hypothyroidism     Created by Conversion Replacement Utility updated for latest IMO load     Kidney stone 11/06/2017    first stone at age 52    Lump In / On The Skin     Created by Conversion     Malignant melanoma of skin of trunk, except scrotum (H)     removed from back 2015    Mild intermittent asthma     Created by Conversion     Multiple Environmental Allergies     Created by Conversion [employer] [facility] Annotation: Jan 11 2010 12:36PM - Farideh Gilliland: DOGS     Pain In The Hands     Created by Conversion     PONV (postoperative nausea and vomiting)     S/P partial colectomy 2/14/2020    Unspecified hypothyroidism     Urinary tract stones 7/31/2017     Physical Exam:    BP (!) 138/90   Pulse 69   Ht 1.829 m (6')   SpO2 100%   BMI 24.28 kg/m      General: patient appears stated age of 55 year old. Nontoxic and in no distress.   HEENT: Head: atraumatic, normocephalic. Sclerae anicteric.  Chest:  Normal respiratory effort  Cardiac:  No edema.   Abdomen: abdomen is non-distended  Extremities: normal tone and muscle bulk.  Skin: no lesions or rash on visible skin. Warm and dry.   CNS: alert and oriented. Grossly non-focal.   Psychiatric: normal mood and affect.     Lab Results:    Recent Results (from the past 168 hour(s))   Comprehensive metabolic panel   Result Value Ref Range    Sodium 138 136 - 145 mmol/L    Potassium 3.9 3.4 - 5.3 mmol/L    Carbon Dioxide (CO2) 28 22 - 29 mmol/L    Anion Gap 9 7 - 15 mmol/L     Urea Nitrogen 16.8 6.0 - 20.0 mg/dL    Creatinine 0.97 0.67 - 1.17 mg/dL    GFR Estimate >90 >60 mL/min/1.73m2    Calcium 9.6 8.6 - 10.0 mg/dL    Chloride 101 98 - 107 mmol/L    Glucose 71 70 - 99 mg/dL    Alkaline Phosphatase 68 40 - 129 U/L    AST 20 0 - 45 U/L    ALT 15 0 - 70 U/L    Protein Total 7.4 6.4 - 8.3 g/dL    Albumin 4.3 3.5 - 5.2 g/dL    Bilirubin Total 0.5 <=1.2 mg/dL   CBC with platelets and differential   Result Value Ref Range    WBC Count 5.6 4.0 - 11.0 10e3/uL    RBC Count 5.28 4.40 - 5.90 10e6/uL    Hemoglobin 14.5 13.3 - 17.7 g/dL    Hematocrit 44.5 40.0 - 53.0 %    MCV 84 78 - 100 fL    MCH 27.5 26.5 - 33.0 pg    MCHC 32.6 31.5 - 36.5 g/dL    RDW 13.8 10.0 - 15.0 %    Platelet Count 261 150 - 450 10e3/uL    % Neutrophils 67 %    % Lymphocytes 22 %    % Monocytes 8 %    % Eosinophils 2 %    % Basophils 1 %    % Immature Granulocytes 0 %    NRBCs per 100 WBC 0 <1 /100    Absolute Neutrophils 3.8 1.6 - 8.3 10e3/uL    Absolute Lymphocytes 1.2 0.8 - 5.3 10e3/uL    Absolute Monocytes 0.5 0.0 - 1.3 10e3/uL    Absolute Eosinophils 0.1 0.0 - 0.7 10e3/uL    Absolute Basophils 0.0 0.0 - 0.2 10e3/uL    Absolute Immature Granulocytes 0.0 <=0.4 10e3/uL    Absolute NRBCs 0.0 10e3/uL     Imaging:    CT Chest/Abdomen/Pelvis w Contrast    Result Date: 9/18/2023  EXAM: CT CHEST/ABDOMEN/PELVIS W CONTRAST LOCATION: [facility] DATE: 9/18/2023 INDICATION: Stage II C melanoma of his back diagnosed June 2014. Posttreatment surveillance. COMPARISON: 09/19/2022 and older studies going back to 2019. TECHNIQUE: CT scan of the chest, abdomen, and pelvis was performed following injection of IV contrast. Multiplanar reformats were obtained. Dose reduction techniques were used. CONTRAST: Isovue 370 90ml FINDINGS: LUNGS AND PLEURA: No suspicious pulmonary nodules or effusions. MEDIASTINUM/AXILLAE: No suspicious adenopathy. Few less than 1 cm left axillary nodes are stable. There is a very small  hiatal hernia. CORONARY ARTERY CALCIFICATION: None. HEPATOBILIARY: Mild, diffuse fatty infiltration of the liver. There is a large dependent gallstone. PANCREAS: Normal. SPLEEN: Normal. ADRENAL GLANDS: Normal. KIDNEYS/BLADDER: Normal. BOWEL: Sigmoid staple line. Diffuse colonic diverticulosis. No ascites or peritoneal tumor nodules. LYMPH NODES: Normal. VASCULATURE: Mild arterial calcifications. PELVIC ORGANS: Prior vascectomy. MUSCULOSKELETAL: Moderate spondylitic changes at L5-S1. No suspicious lesions. Presumed postsurgical changes in the left lower paraspinal thoracic region with skin thickening and minimal scarring (axial image 78) is unchanged.     IMPRESSION: 1.  No evidence of recurrent tumor in the chest, abdomen or pelvis. 2.  There is a small hiatal hernia. 3.  Mild hepatic steatosis. 4.  Cholelithiasis. 5.  Diffuse colonic diverticulosis with sigmoid staple line.       Signed by: Kane Mann MD

## 2023-10-27 ENCOUNTER — TRANSFERRED RECORDS (OUTPATIENT)
Dept: HEALTH INFORMATION MANAGEMENT | Facility: CLINIC | Age: 58
End: 2023-10-27
Payer: COMMERCIAL

## 2023-11-27 ENCOUNTER — OFFICE VISIT (OUTPATIENT)
Dept: FAMILY MEDICINE | Facility: CLINIC | Age: 58
End: 2023-11-27
Payer: COMMERCIAL

## 2023-11-27 VITALS
WEIGHT: 185 LBS | HEART RATE: 72 BPM | HEIGHT: 72 IN | SYSTOLIC BLOOD PRESSURE: 130 MMHG | DIASTOLIC BLOOD PRESSURE: 84 MMHG | BODY MASS INDEX: 25.06 KG/M2 | TEMPERATURE: 98.1 F | OXYGEN SATURATION: 97 % | RESPIRATION RATE: 12 BRPM

## 2023-11-27 DIAGNOSIS — C43.59 MALIGNANT MELANOMA OF SKIN OF TRUNK, EXCEPT SCROTUM (H): ICD-10-CM

## 2023-11-27 DIAGNOSIS — J45.20 MILD INTERMITTENT ASTHMA WITHOUT COMPLICATION: ICD-10-CM

## 2023-11-27 DIAGNOSIS — E03.9 HYPOTHYROIDISM, UNSPECIFIED TYPE: Primary | ICD-10-CM

## 2023-11-27 LAB — TSH SERPL DL<=0.005 MIU/L-ACNC: 2.9 UIU/ML (ref 0.3–4.2)

## 2023-11-27 PROCEDURE — 90686 IIV4 VACC NO PRSV 0.5 ML IM: CPT | Performed by: PHYSICIAN ASSISTANT

## 2023-11-27 PROCEDURE — 90471 IMMUNIZATION ADMIN: CPT | Performed by: PHYSICIAN ASSISTANT

## 2023-11-27 PROCEDURE — 91320 SARSCV2 VAC 30MCG TRS-SUC IM: CPT | Performed by: PHYSICIAN ASSISTANT

## 2023-11-27 PROCEDURE — 99214 OFFICE O/P EST MOD 30 MIN: CPT | Mod: 25 | Performed by: PHYSICIAN ASSISTANT

## 2023-11-27 PROCEDURE — 36415 COLL VENOUS BLD VENIPUNCTURE: CPT | Performed by: PHYSICIAN ASSISTANT

## 2023-11-27 PROCEDURE — 90480 ADMN SARSCOV2 VAC 1/ONLY CMP: CPT | Performed by: PHYSICIAN ASSISTANT

## 2023-11-27 PROCEDURE — 84443 ASSAY THYROID STIM HORMONE: CPT | Performed by: PHYSICIAN ASSISTANT

## 2023-11-27 RX ORDER — LEVOTHYROXINE SODIUM 200 UG/1
200 TABLET ORAL DAILY
Qty: 90 TABLET | Refills: 3 | Status: CANCELLED | OUTPATIENT
Start: 2023-11-27

## 2023-11-27 ASSESSMENT — ENCOUNTER SYMPTOMS
WHEEZING: 0
COUGH: 0
LIGHT-HEADEDNESS: 0
HEADACHES: 0
SHORTNESS OF BREATH: 0
CHILLS: 0
PALPITATIONS: 0
FEVER: 0
GASTROINTESTINAL NEGATIVE: 1
DIZZINESS: 0
EYES NEGATIVE: 1

## 2023-11-27 NOTE — PROGRESS NOTES
Assessment & Plan     Hypothyroidism, unspecified type  Currently on 200 mcg daily.  Last TSH as of 12/21 was elevated at 41.43 with a free T4 of 1.05.  We will check a TSH level to see where we are at.  May need adjustment based on next blood work.  - TSH with free T4 reflex; Future  TSH   Date Value Ref Range Status   12/29/2021 41.43 (H) 0.30 - 5.00 uIU/mL Final      Mild intermittent asthma without complication  Mild per symptoms asthma.  Symptoms are stable at this time.  He denies any coughing, wheezing, shortness of breath.  Continue with albuterol as needed.    Malignant neoplasm of the skin of trunk, except scrotum  Diagnosed with melanoma to his back in 2014.  Most recent CT scan was negative for malignancy.  Oncology has signed off at this time.  He is continuing to follow with dermatology.     CT chest/abdomen/pelvis 9/18/2023  IMPRESSION:  1.  No evidence of recurrent tumor in the chest, abdomen or pelvis.  2.  There is a small hiatal hernia.  3.  Mild hepatic steatosis.  4.  Cholelithiasis.  5.  Diffuse colonic diverticulosis with sigmoid staple line.    Screening for hyperlipidemia  He did not wish to check lipid panel today.  Last lipid panel as of 12/21 showed a LDL of 154, triglyceride 203, HDL 41.  Continue to work on diet and stay active.  Recent Labs   Lab Test 12/29/21  1047 07/31/17  1331   CHOL 252* 180   HDL 41 33*   * 83   TRIG 283* 319*       He is due for an annual physical.  Recommended following up with his primary care provider in the next few months.     Follow-up Visit   Expected date:  Feb 27, 2024 (Approximate)      Follow Up Appointment Details:     Follow-up with whom?: PCP    Follow-Up for what?: Adult Preventive    Any Additional Chronic Condition Management?: General (Other)    How?: In Person                     Current Outpatient Medications   Medication    albuterol (VENTOLIN HFA) 108 (90 Base) MCG/ACT inhaler    esomeprazole (NEXIUM) 20 MG DR capsule     levothyroxine (SYNTHROID/LEVOTHROID) 200 MCG tablet     No current facility-administered medications for this visit.            BMI:   Estimated body mass index is 25.09 kg/m  as calculated from the following:    Height as of this encounter: 1.829 m (6').    Weight as of this encounter: 83.9 kg (185 lb).   Weight management plan: Discussed healthy diet and exercise guidelines        ANU[facility]   Omi is a 58 year old, presenting for the following health issues:  Recheck Medication (Fasting medication management)        11/27/2023     8:12 AM   Additional Questions   Roomed by Sravani Malin   Accompanied by gerardo       Omi is a pleasant 58-year-old male who presents to the clinic today for follow-up on chronic disease management.  Past medical history significant for hypothyroidism, asthma, hyperparathyroidism, and melanoma to his back.  He is feeling well today.  He does need refill on levothyroxine.  He is currently using levothyroxine 200 mcg daily.  Last TSH 12/21 was elevated at 41 with a free T4 of 1.05.  He was diagnosed with melanoma in 2014 on his back.  Imaging through oncology have been negative.  Oncology has released him from their service but he is to continue following with dermatology annually.  Underlying history of asthma well-controlled utilizing albuterol as needed.    History of Present Illness       Reason for visit:  Check up    He eats 0-1 servings of fruits and vegetables daily.He consumes 1 sweetened beverage(s) daily.He exercises with enough effort to increase his heart rate 30 to 60 minutes per day.  He exercises with enough effort to increase his heart rate 4 days per week. He is missing 1 dose(s) of medications per week.       Review of Systems   Constitutional:  Negative for chills and fever.   HENT: Negative.     Eyes: Negative.    Respiratory:  Negative for cough, shortness of breath and wheezing.    Cardiovascular:   Negative for chest pain and palpitations.   Gastrointestinal: Negative.    Genitourinary: Negative.    Neurological:  Negative for dizziness, light-headedness and headaches.            Objective    /84 (BP Location: Left arm, Patient Position: Sitting, Cuff Size: Adult Regular)   Pulse 72   Temp 98.1  F (36.7  C) (Oral)   Resp 12   Ht 1.829 m (6')   Wt 83.9 kg (185 lb)   SpO2 97%   BMI 25.09 kg/m    Body mass index is 25.09 kg/m .  Physical Exam  Vitals and nursing note reviewed.   Constitutional:       Appearance: Normal appearance.   HENT:      Head: Normocephalic and atraumatic.   Eyes:      Conjunctiva/sclera: Conjunctivae normal.   Cardiovascular:      Rate and Rhythm: Normal rate and regular rhythm.      Heart sounds: No murmur heard.     No friction rub. No gallop.   Pulmonary:      Effort: Pulmonary effort is normal.      Breath sounds: No wheezing, rhonchi or rales.   Neurological:      General: No focal deficit present.      Mental Status: He is alert and oriented to person, place, and time.      Motor: No weakness.   Psychiatric:         Mood and Affect: Mood normal.         Behavior: Behavior normal.         Thought Content: Thought content normal.         Judgment: Judgment normal.

## 2023-11-28 RX ORDER — LEVOTHYROXINE SODIUM 200 UG/1
200 TABLET ORAL DAILY
Qty: 90 TABLET | Refills: 0 | Status: SHIPPED | OUTPATIENT
Start: 2023-11-28 | End: 2024-04-18

## 2024-03-03 ENCOUNTER — HEALTH MAINTENANCE LETTER (OUTPATIENT)
Age: 59
End: 2024-03-03

## 2024-04-17 DIAGNOSIS — E03.9 HYPOTHYROIDISM, UNSPECIFIED TYPE: ICD-10-CM

## 2024-04-18 RX ORDER — LEVOTHYROXINE SODIUM 200 UG/1
200 TABLET ORAL DAILY
Qty: 90 TABLET | Refills: 1 | Status: SHIPPED | OUTPATIENT
Start: 2024-04-18

## 2024-04-30 ENCOUNTER — TRANSFERRED RECORDS (OUTPATIENT)
Dept: HEALTH INFORMATION MANAGEMENT | Facility: CLINIC | Age: 59
End: 2024-04-30
Payer: COMMERCIAL

## 2024-05-13 ENCOUNTER — OFFICE VISIT (OUTPATIENT)
Dept: FAMILY MEDICINE | Facility: CLINIC | Age: 59
End: 2024-05-13
Payer: COMMERCIAL

## 2024-05-13 VITALS
HEIGHT: 72 IN | OXYGEN SATURATION: 99 % | WEIGHT: 186.8 LBS | HEART RATE: 78 BPM | RESPIRATION RATE: 12 BRPM | SYSTOLIC BLOOD PRESSURE: 162 MMHG | BODY MASS INDEX: 25.3 KG/M2 | DIASTOLIC BLOOD PRESSURE: 103 MMHG | TEMPERATURE: 98.3 F

## 2024-05-13 DIAGNOSIS — S91.209A TOENAIL AVULSION, INITIAL ENCOUNTER: Primary | ICD-10-CM

## 2024-05-13 PROCEDURE — 99213 OFFICE O/P EST LOW 20 MIN: CPT | Performed by: FAMILY MEDICINE

## 2024-05-13 ASSESSMENT — ASTHMA QUESTIONNAIRES
QUESTION_2 LAST FOUR WEEKS HOW OFTEN HAVE YOU HAD SHORTNESS OF BREATH: NOT AT ALL
QUESTION_4 LAST FOUR WEEKS HOW OFTEN HAVE YOU USED YOUR RESCUE INHALER OR NEBULIZER MEDICATION (SUCH AS ALBUTEROL): ONCE A WEEK OR LESS
QUESTION_5 LAST FOUR WEEKS HOW WOULD YOU RATE YOUR ASTHMA CONTROL: COMPLETELY CONTROLLED
ACT_TOTALSCORE: 24
QUESTION_3 LAST FOUR WEEKS HOW OFTEN DID YOUR ASTHMA SYMPTOMS (WHEEZING, COUGHING, SHORTNESS OF BREATH, CHEST TIGHTNESS OR PAIN) WAKE YOU UP AT NIGHT OR EARLIER THAN USUAL IN THE MORNING: NOT AT ALL
ACT_TOTALSCORE: 24
QUESTION_1 LAST FOUR WEEKS HOW MUCH OF THE TIME DID YOUR ASTHMA KEEP YOU FROM GETTING AS MUCH DONE AT WORK, SCHOOL OR AT HOME: NONE OF THE TIME

## 2024-05-13 ASSESSMENT — PAIN SCALES - GENERAL: PAINLEVEL: MILD PAIN (3)

## 2024-05-13 NOTE — PROGRESS NOTES
"  Assessment & Plan   Problem List Items Addressed This Visit    None  Visit Diagnoses       Toenail avulsion, initial encounter    -  Primary           Recommend keeping nail trimmed, watch for infection.  No indication for removal at this time.    Toenail re-dressed after application of abx cream.   If looking infected, may need to reassess or start PO abx.    BRITTANI POZO MD         Haile Braga is a 58 year old, presenting for the following health issues:  Nail Problem (Right toenail was ripped back after having a cart roll over toe at restaurant on Saturday. Pain is currently at a 3/10 when walking. Pt's foot has been swollen. )        5/13/2024    10:40 AM   Additional Questions   Roomed by Katy SALAZAR CMA     History of Present Illness       Reason for visit:  Toenail on right foot  Symptom onset:  1-3 days ago    He eats 0-1 servings of fruits and vegetables daily.He consumes 0 sweetened beverage(s) daily.He exercises with enough effort to increase his heart rate 9 or less minutes per day.  He exercises with enough effort to increase his heart rate 3 or less days per week. He is missing 1 dose(s) of medications per week.             Review of Systems  Constitutional, HEENT, cardiovascular, pulmonary, gi and gu systems are negative, except as otherwise noted.      Objective    BP (!) 161/98 (BP Location: Right arm, Patient Position: Sitting, Cuff Size: Adult Regular)   Pulse 78   Temp 98.3  F (36.8  C) (Oral)   Resp 12   Ht 1.816 m (5' 11.5\")   Wt 84.7 kg (186 lb 12.8 oz)   SpO2 99%   BMI 25.69 kg/m    Body mass index is 25.69 kg/m .  Physical Exam   GENERAL: alert and no distress  Exam of right foot reveals 1st toenail damage.   Distal edge of nail has been bent back with some underlying trauma to nailbed, also to medial edge.   Slight redness noted on medial border.   No surrunding redness.   TTP.   No other skin disruption.  No subungal hematoma            Signed Electronically by: BRITTANI TAPIA" MD NOHELIA

## 2025-01-27 ASSESSMENT — ASTHMA QUESTIONNAIRES
QUESTION_5 LAST FOUR WEEKS HOW WOULD YOU RATE YOUR ASTHMA CONTROL: COMPLETELY CONTROLLED
QUESTION_3 LAST FOUR WEEKS HOW OFTEN DID YOUR ASTHMA SYMPTOMS (WHEEZING, COUGHING, SHORTNESS OF BREATH, CHEST TIGHTNESS OR PAIN) WAKE YOU UP AT NIGHT OR EARLIER THAN USUAL IN THE MORNING: NOT AT ALL
QUESTION_2 LAST FOUR WEEKS HOW OFTEN HAVE YOU HAD SHORTNESS OF BREATH: NOT AT ALL
QUESTION_1 LAST FOUR WEEKS HOW MUCH OF THE TIME DID YOUR ASTHMA KEEP YOU FROM GETTING AS MUCH DONE AT WORK, SCHOOL OR AT HOME: NONE OF THE TIME
ACT_TOTALSCORE: 25
QUESTION_4 LAST FOUR WEEKS HOW OFTEN HAVE YOU USED YOUR RESCUE INHALER OR NEBULIZER MEDICATION (SUCH AS ALBUTEROL): NOT AT ALL
ACT_TOTALSCORE: 25

## 2025-01-28 ENCOUNTER — OFFICE VISIT (OUTPATIENT)
Dept: FAMILY MEDICINE | Facility: CLINIC | Age: 60
End: 2025-01-28
Payer: COMMERCIAL

## 2025-01-28 VITALS
WEIGHT: 192 LBS | TEMPERATURE: 98 F | RESPIRATION RATE: 16 BRPM | HEIGHT: 72 IN | BODY MASS INDEX: 26.01 KG/M2 | SYSTOLIC BLOOD PRESSURE: 186 MMHG | HEART RATE: 83 BPM | OXYGEN SATURATION: 99 % | DIASTOLIC BLOOD PRESSURE: 100 MMHG

## 2025-01-28 DIAGNOSIS — I73.00 RAYNAUD'S DISEASE WITHOUT GANGRENE: ICD-10-CM

## 2025-01-28 DIAGNOSIS — R03.0 ELEVATED BLOOD PRESSURE READING WITHOUT DIAGNOSIS OF HYPERTENSION: ICD-10-CM

## 2025-01-28 DIAGNOSIS — Z85.820 HISTORY OF MELANOMA: ICD-10-CM

## 2025-01-28 DIAGNOSIS — Z11.4 SCREENING FOR HIV (HUMAN IMMUNODEFICIENCY VIRUS): ICD-10-CM

## 2025-01-28 DIAGNOSIS — Z11.59 NEED FOR HEPATITIS C SCREENING TEST: ICD-10-CM

## 2025-01-28 DIAGNOSIS — E03.9 HYPOTHYROIDISM, UNSPECIFIED TYPE: Primary | ICD-10-CM

## 2025-01-28 DIAGNOSIS — Z13.220 LIPID SCREENING: ICD-10-CM

## 2025-01-28 LAB
ERYTHROCYTE [DISTWIDTH] IN BLOOD BY AUTOMATED COUNT: 12.9 % (ref 10–15)
ERYTHROCYTE [SEDIMENTATION RATE] IN BLOOD BY WESTERGREN METHOD: 10 MM/HR (ref 0–20)
HCT VFR BLD AUTO: 44.4 % (ref 40–53)
HGB BLD-MCNC: 15 G/DL (ref 13.3–17.7)
MCH RBC QN AUTO: 28 PG (ref 26.5–33)
MCHC RBC AUTO-ENTMCNC: 33.8 G/DL (ref 31.5–36.5)
MCV RBC AUTO: 83 FL (ref 78–100)
PLATELET # BLD AUTO: 239 10E3/UL (ref 150–450)
RBC # BLD AUTO: 5.36 10E6/UL (ref 4.4–5.9)
WBC # BLD AUTO: 5.9 10E3/UL (ref 4–11)

## 2025-01-28 PROCEDURE — 99213 OFFICE O/P EST LOW 20 MIN: CPT | Performed by: FAMILY MEDICINE

## 2025-01-28 PROCEDURE — 85652 RBC SED RATE AUTOMATED: CPT | Performed by: FAMILY MEDICINE

## 2025-01-28 PROCEDURE — 86803 HEPATITIS C AB TEST: CPT | Performed by: FAMILY MEDICINE

## 2025-01-28 PROCEDURE — 85027 COMPLETE CBC AUTOMATED: CPT | Performed by: FAMILY MEDICINE

## 2025-01-28 PROCEDURE — 80061 LIPID PANEL: CPT | Performed by: FAMILY MEDICINE

## 2025-01-28 PROCEDURE — 80053 COMPREHEN METABOLIC PANEL: CPT | Performed by: FAMILY MEDICINE

## 2025-01-28 PROCEDURE — 84443 ASSAY THYROID STIM HORMONE: CPT | Performed by: FAMILY MEDICINE

## 2025-01-28 PROCEDURE — 36415 COLL VENOUS BLD VENIPUNCTURE: CPT | Performed by: FAMILY MEDICINE

## 2025-01-28 PROCEDURE — 84439 ASSAY OF FREE THYROXINE: CPT | Performed by: FAMILY MEDICINE

## 2025-01-28 NOTE — PROGRESS NOTES
"  {PROVIDER CHARTING PREFERENCE:611306}    Haile Buenrostro is a 59 year old, presenting for the following health issues:  Recheck Medication        1/28/2025     3:02 PM   Additional Questions   Roomed by JORGE Magana     History of Present Illness       Hypothyroidism:     Since last visit, patient describes the following symptoms::  None    Reason for visit:  Follow up hypothyroid, right elbow new issue, possible raynauds    He eats 0-1 servings of fruits and vegetables daily.He consumes 1 sweetened beverage(s) daily.He exercises with enough effort to increase his heart rate 20 to 29 minutes per day.  He exercises with enough effort to increase his heart rate 3 or less days per week.   He is taking medications regularly.       He is here today for a few issues.     He does note that he has had some issues with his right elbow. He does have pain all the time. He has half the strength that he would expect. It's been a few weeks. Pain from distal bicep to proximal forearm. Rotation seems ok, pain mor with flexion.     Thyroid feels fine.     He does think that he maybe has Raynauds as well      Review of Systems  Constitutional, HEENT, cardiovascular, pulmonary, gi and gu systems are negative, except as otherwise noted.      Objective    BP (!) 156/90   Pulse 83   Temp 98  F (36.7  C)   Resp 16   Ht 1.816 m (5' 11.5\")   Wt 87.1 kg (192 lb)   SpO2 99%   BMI 26.41 kg/m    Body mass index is 26.41 kg/m .  Physical Exam   {Exam List (Optional):287005}    {Diagnostic Test Results (Optional):283385}        Signed Electronically by: Danuta Galicia MD  {Email feedback regarding this note to [email or web address]   :[id number]}  " "day.  He exercises with enough effort to increase his heart rate 3 or less days per week.   He is taking medications regularly.       He is here today for a few issues.     He does note that he has had some issues with his right elbow. He does have pain all the time. He has half the strength that he would expect. It's been a few weeks. Pain from distal bicep to proximal forearm. Rotation seems ok, pain mor with flexion.     Thyroid feels fine.     He does think that he maybe has Raynauds as well      Review of Systems  Constitutional, HEENT, cardiovascular, pulmonary, gi and gu systems are negative, except as otherwise noted.      Objective    BP (!) 186/100   Pulse 83   Temp 98  F (36.7  C)   Resp 16   Ht 1.816 m (5' 11.5\")   Wt 87.1 kg (192 lb)   SpO2 99%   BMI 26.41 kg/m    Body mass index is 26.41 kg/m .  Physical Exam   GENERAL: alert and no distress  EYES: Eyes grossly normal to inspection, PERRL and conjunctivae and sclerae normal  NECK: no adenopathy, no asymmetry, masses, or scars  RESP: lungs clear to auscultation - no rales, rhonchi or wheezes  CV: regular rate and rhythm, normal S1 S2, no S3 or S4, no murmur, click or rub, no peripheral edema  ABDOMEN: soft, nontender, no hepatosplenomegaly, no masses and bowel sounds normal  MS: no gross musculoskeletal defects noted, no edema  SKIN: no suspicious lesions or rashes          Signed Electronically by: Danuta Galicia MD    "

## 2025-01-28 NOTE — PATIENT INSTRUCTIONS
We can consider having you start a very low dose of amlodipine, this can help prevent the spasm that causes the Raynaud's symptoms. Your blood pressure should tolerate this just fine. Swelling is the other big side effect, at high doses, 10 mg. I'd start you at 2.5.     You should consider getting the shingles and the prevnar (pneumonia ) shots. Hepatitis B vaccines as well.     Please check your blood pressure a few times this week and let me know what the numbers are.

## 2025-01-29 LAB
ALBUMIN SERPL BCG-MCNC: 4.4 G/DL (ref 3.5–5.2)
ALP SERPL-CCNC: 79 U/L (ref 40–150)
ALT SERPL W P-5'-P-CCNC: 21 U/L (ref 0–70)
ANION GAP SERPL CALCULATED.3IONS-SCNC: 12 MMOL/L (ref 7–15)
AST SERPL W P-5'-P-CCNC: 25 U/L (ref 0–45)
BILIRUB SERPL-MCNC: 0.4 MG/DL
BUN SERPL-MCNC: 18 MG/DL (ref 6–20)
CALCIUM SERPL-MCNC: 9.7 MG/DL (ref 8.8–10.4)
CHLORIDE SERPL-SCNC: 101 MMOL/L (ref 98–107)
CREAT SERPL-MCNC: 1.02 MG/DL (ref 0.67–1.17)
EGFRCR SERPLBLD CKD-EPI 2021: 85 ML/MIN/1.73M2
GLUCOSE SERPL-MCNC: 93 MG/DL (ref 70–99)
HCO3 SERPL-SCNC: 26 MMOL/L (ref 22–29)
HCV AB SERPL QL IA: NONREACTIVE
POTASSIUM SERPL-SCNC: 3.9 MMOL/L (ref 3.4–5.3)
PROT SERPL-MCNC: 7.6 G/DL (ref 6.4–8.3)
SODIUM SERPL-SCNC: 139 MMOL/L (ref 135–145)
T4 FREE SERPL-MCNC: 1.74 NG/DL (ref 0.9–1.7)
TSH SERPL DL<=0.005 MIU/L-ACNC: 1.38 UIU/ML (ref 0.3–4.2)

## 2025-01-30 LAB
CHOLEST SERPL-MCNC: 229 MG/DL
HDLC SERPL-MCNC: 32 MG/DL
LDLC SERPL CALC-MCNC: 132 MG/DL
NONHDLC SERPL-MCNC: 197 MG/DL
TRIGL SERPL-MCNC: 323 MG/DL

## 2025-02-02 DIAGNOSIS — E03.9 HYPOTHYROIDISM, UNSPECIFIED TYPE: ICD-10-CM

## 2025-02-03 RX ORDER — LEVOTHYROXINE SODIUM 200 UG/1
200 TABLET ORAL DAILY
Qty: 90 TABLET | Refills: 2 | Status: SHIPPED | OUTPATIENT
Start: 2025-02-03

## 2025-03-15 ENCOUNTER — HEALTH MAINTENANCE LETTER (OUTPATIENT)
Age: 60
End: 2025-03-15

## (undated) DEVICE — TUBING SMOKE EVAC PNEUMOCLEAR HIGH FLOW 0620050250

## (undated) DEVICE — DRSG TEGADERM 4X4 3/4" 1626W

## (undated) DEVICE — GLOVE BIOGEL PI ORTHOPRO SZ 7.5 47675

## (undated) DEVICE — SU ETHIBOND 0 CT-2 30" X412H

## (undated) DEVICE — BANDAGE ADH LF 1X3 ABN3100A

## (undated) DEVICE — SOL WATER IRRIG 1000ML BOTTLE 2F7114

## (undated) DEVICE — PAD POS XL 1X20X40IN PINK PIGAZZI

## (undated) DEVICE — BLADE KNIFE SURG 11 371111

## (undated) DEVICE — SU WND CLOSURE V-LOC 90 SZ 2-0 12" GS-21 VLOCM0315

## (undated) DEVICE — SUTURE VICRYL+ 2-0 27IN SH UND VCP417H

## (undated) DEVICE — CUSTOM PACK LAP CHOLE SBA5BLCHEA

## (undated) DEVICE — DAVINCI XI DRAPE ARM 470015

## (undated) DEVICE — DAVINCI XI DRAPE COLUMN 470341

## (undated) DEVICE — DRSG STERI STRIP 1/2X4" R1547

## (undated) DEVICE — PREP CHLORAPREP 26ML TINTED HI-LITE ORANGE 930815

## (undated) DEVICE — LUBRICANT INST ELECTROLUBE EL101

## (undated) DEVICE — DRAPE U SPLIT 74X120" 29440

## (undated) DEVICE — GOWN XLG DISP 9545

## (undated) DEVICE — Device

## (undated) DEVICE — MARKER SURG SKIN STRL 77734

## (undated) DEVICE — CATH TRAY FOLEY SURESTEP 16FR DRAIN BAG STATOCK A899916

## (undated) DEVICE — DECANTER VIAL 2006S

## (undated) DEVICE — SU MONOCRYL+ 4-0 18IN PS2 UND MCP496G

## (undated) DEVICE — DRESSING COVERLET STRIP 3/4 X 3 LF 230

## (undated) DEVICE — DRAPE SHEET REV FOLD 3/4 9349

## (undated) DEVICE — DAVINCI XI SEAL UNIVERSAL 5-8MM 470361

## (undated) DEVICE — NDL INSUFFLATION 13GA 120MM C2201

## (undated) DEVICE — DRAPE SHEET TABLE COVER KC 42301*

## (undated) DEVICE — DAVINCI XI OBTURATOR BLADELESS 8MM 470359

## (undated) DEVICE — DAVINCI HOT SHEARS TIP COVER  400180

## (undated) DEVICE — COTTON SQUARE 3X3 STERILE

## (undated) DEVICE — SYR 50ML SLIP TIP W/O NDL 309654

## (undated) DEVICE — SU WND CLOSURE V-LOC PBT SZ 0 18" GS-21 VLOCN0326

## (undated) RX ORDER — PROPOFOL 10 MG/ML
INJECTION, EMULSION INTRAVENOUS
Status: DISPENSED
Start: 2022-12-22

## (undated) RX ORDER — BUPIVACAINE HYDROCHLORIDE 2.5 MG/ML
INJECTION, SOLUTION INFILTRATION; PERINEURAL
Status: DISPENSED
Start: 2022-12-22

## (undated) RX ORDER — DEXAMETHASONE SODIUM PHOSPHATE 10 MG/ML
INJECTION, SOLUTION INTRAMUSCULAR; INTRAVENOUS
Status: DISPENSED
Start: 2022-12-22

## (undated) RX ORDER — KETAMINE HYDROCHLORIDE 10 MG/ML
INJECTION INTRAMUSCULAR; INTRAVENOUS
Status: DISPENSED
Start: 2022-12-22

## (undated) RX ORDER — ONDANSETRON 2 MG/ML
INJECTION INTRAMUSCULAR; INTRAVENOUS
Status: DISPENSED
Start: 2022-12-22

## (undated) RX ORDER — KETOROLAC TROMETHAMINE 30 MG/ML
INJECTION, SOLUTION INTRAMUSCULAR; INTRAVENOUS
Status: DISPENSED
Start: 2022-12-22

## (undated) RX ORDER — FENTANYL CITRATE 50 UG/ML
INJECTION, SOLUTION INTRAMUSCULAR; INTRAVENOUS
Status: DISPENSED
Start: 2022-12-22

## (undated) RX ORDER — GLYCOPYRROLATE 0.2 MG/ML
INJECTION INTRAMUSCULAR; INTRAVENOUS
Status: DISPENSED
Start: 2022-12-22